# Patient Record
Sex: FEMALE | Race: WHITE | Employment: OTHER | URBAN - METROPOLITAN AREA
[De-identification: names, ages, dates, MRNs, and addresses within clinical notes are randomized per-mention and may not be internally consistent; named-entity substitution may affect disease eponyms.]

---

## 2017-07-11 ENCOUNTER — ANESTHESIA (OUTPATIENT)
Dept: ENDOSCOPY | Age: 72
End: 2017-07-11
Payer: MEDICARE

## 2017-07-11 ENCOUNTER — ANESTHESIA EVENT (OUTPATIENT)
Dept: ENDOSCOPY | Age: 72
End: 2017-07-11
Payer: MEDICARE

## 2017-07-11 ENCOUNTER — HOSPITAL ENCOUNTER (OUTPATIENT)
Age: 72
Setting detail: OUTPATIENT SURGERY
Discharge: HOME | End: 2017-07-11
Attending: SPECIALIST | Admitting: SPECIALIST
Payer: MEDICARE

## 2017-07-11 VITALS
SYSTOLIC BLOOD PRESSURE: 114 MMHG | HEIGHT: 65 IN | OXYGEN SATURATION: 99 % | RESPIRATION RATE: 16 BRPM | BODY MASS INDEX: 25.56 KG/M2 | TEMPERATURE: 97.6 F | WEIGHT: 153.44 LBS | DIASTOLIC BLOOD PRESSURE: 72 MMHG | HEART RATE: 61 BPM

## 2017-07-11 PROCEDURE — 76060000031 HC ANESTHESIA FIRST 0.5 HR: Performed by: SPECIALIST

## 2017-07-11 PROCEDURE — 74011250637 HC RX REV CODE- 250/637: Performed by: SPECIALIST

## 2017-07-11 PROCEDURE — 74011000258 HC RX REV CODE- 258

## 2017-07-11 PROCEDURE — 74011250636 HC RX REV CODE- 250/636

## 2017-07-11 PROCEDURE — 74011250636 HC RX REV CODE- 250/636: Performed by: SPECIALIST

## 2017-07-11 PROCEDURE — 77030027957 HC TBNG IRR ENDOGTR BUSS -B: Performed by: SPECIALIST

## 2017-07-11 PROCEDURE — 76040000019: Performed by: SPECIALIST

## 2017-07-11 RX ORDER — MIDAZOLAM HYDROCHLORIDE 1 MG/ML
.25-1 INJECTION, SOLUTION INTRAMUSCULAR; INTRAVENOUS
Status: DISCONTINUED | OUTPATIENT
Start: 2017-07-11 | End: 2017-07-11 | Stop reason: HOSPADM

## 2017-07-11 RX ORDER — PROPOFOL 10 MG/ML
INJECTION, EMULSION INTRAVENOUS AS NEEDED
Status: DISCONTINUED | OUTPATIENT
Start: 2017-07-11 | End: 2017-07-11 | Stop reason: HOSPADM

## 2017-07-11 RX ORDER — SODIUM CHLORIDE 0.9 % (FLUSH) 0.9 %
5-10 SYRINGE (ML) INJECTION AS NEEDED
Status: DISCONTINUED | OUTPATIENT
Start: 2017-07-11 | End: 2017-07-11 | Stop reason: HOSPADM

## 2017-07-11 RX ORDER — DEXTROMETHORPHAN/PSEUDOEPHED 2.5-7.5/.8
1.2 DROPS ORAL
Status: DISCONTINUED | OUTPATIENT
Start: 2017-07-11 | End: 2017-07-11 | Stop reason: HOSPADM

## 2017-07-11 RX ORDER — EPINEPHRINE 0.1 MG/ML
1 INJECTION INTRACARDIAC; INTRAVENOUS
Status: DISCONTINUED | OUTPATIENT
Start: 2017-07-11 | End: 2017-07-11 | Stop reason: HOSPADM

## 2017-07-11 RX ORDER — NALOXONE HYDROCHLORIDE 0.4 MG/ML
0.4 INJECTION, SOLUTION INTRAMUSCULAR; INTRAVENOUS; SUBCUTANEOUS
Status: DISCONTINUED | OUTPATIENT
Start: 2017-07-11 | End: 2017-07-11 | Stop reason: HOSPADM

## 2017-07-11 RX ORDER — SODIUM CHLORIDE 9 MG/ML
INJECTION, SOLUTION INTRAVENOUS
Status: DISCONTINUED | OUTPATIENT
Start: 2017-07-11 | End: 2017-07-11 | Stop reason: HOSPADM

## 2017-07-11 RX ORDER — FENTANYL CITRATE 50 UG/ML
200 INJECTION, SOLUTION INTRAMUSCULAR; INTRAVENOUS
Status: DISCONTINUED | OUTPATIENT
Start: 2017-07-11 | End: 2017-07-11 | Stop reason: HOSPADM

## 2017-07-11 RX ORDER — ATROPINE SULFATE 0.1 MG/ML
0.5 INJECTION INTRAVENOUS
Status: DISCONTINUED | OUTPATIENT
Start: 2017-07-11 | End: 2017-07-11 | Stop reason: HOSPADM

## 2017-07-11 RX ORDER — SODIUM CHLORIDE 9 MG/ML
50 INJECTION, SOLUTION INTRAVENOUS CONTINUOUS
Status: DISCONTINUED | OUTPATIENT
Start: 2017-07-11 | End: 2017-07-11 | Stop reason: HOSPADM

## 2017-07-11 RX ORDER — SODIUM CHLORIDE 0.9 % (FLUSH) 0.9 %
5-10 SYRINGE (ML) INJECTION EVERY 8 HOURS
Status: DISCONTINUED | OUTPATIENT
Start: 2017-07-11 | End: 2017-07-11 | Stop reason: HOSPADM

## 2017-07-11 RX ORDER — FLUMAZENIL 0.1 MG/ML
0.2 INJECTION INTRAVENOUS
Status: DISCONTINUED | OUTPATIENT
Start: 2017-07-11 | End: 2017-07-11 | Stop reason: HOSPADM

## 2017-07-11 RX ADMIN — PROPOFOL 25 MG: 10 INJECTION, EMULSION INTRAVENOUS at 14:01

## 2017-07-11 RX ADMIN — PROPOFOL 25 MG: 10 INJECTION, EMULSION INTRAVENOUS at 13:59

## 2017-07-11 RX ADMIN — PROPOFOL 25 MG: 10 INJECTION, EMULSION INTRAVENOUS at 14:05

## 2017-07-11 RX ADMIN — SODIUM CHLORIDE: 9 INJECTION, SOLUTION INTRAVENOUS at 13:47

## 2017-07-11 RX ADMIN — PROPOFOL 25 MG: 10 INJECTION, EMULSION INTRAVENOUS at 14:03

## 2017-07-11 RX ADMIN — SODIUM CHLORIDE 50 ML/HR: 900 INJECTION, SOLUTION INTRAVENOUS at 13:25

## 2017-07-11 RX ADMIN — SIMETHICONE 80 MG: 63.3; 3.7 SOLUTION/ DROPS ORAL at 14:04

## 2017-07-11 RX ADMIN — PROPOFOL 25 MG: 10 INJECTION, EMULSION INTRAVENOUS at 13:55

## 2017-07-11 RX ADMIN — PROPOFOL 100 MG: 10 INJECTION, EMULSION INTRAVENOUS at 13:53

## 2017-07-11 NOTE — H&P
Colonoscopy History and Physical      The patient was seen and examined. Date of last colonoscopy: 2005, Polyps  No      The airway was assessed and documented. The problem list, past medical history, and medications were reviewed. There is no problem list on file for this patient. Social History     Social History    Marital status:      Spouse name: N/A    Number of children: N/A    Years of education: N/A     Occupational History    Not on file. Social History Main Topics    Smoking status: Never Smoker    Smokeless tobacco: Never Used    Alcohol use Yes      Comment: occasional    Drug use: No    Sexual activity: Not on file     Other Topics Concern    Not on file     Social History Narrative    No narrative on file     No past medical history on file. Prior to Admission Medications   Prescriptions Last Dose Informant Patient Reported? Taking?   calcium-cholecalciferol, d3, (CALCIUM 600 + D) 600-125 mg-unit tab 7/10/2017 at Unknown time  Yes Yes   Sig: Take 1 Tab by mouth daily. Facility-Administered Medications: None       The patient was seen and examined in the endoscopy suite. The airway was assessed and docuemented. The problem list and medications were reviewed. Chief complaint, history of present illness, and review of systems and Past medical History are positive for: colon cancer screening. The heart, lungs and mental status were satisfactory for the administration of sedation and for the procedure. I discussed with the patient the objectives, risks, consequences and alternatives to the procedure.      Plan: Endoscopic procedure with sedation     Lincoln Champagne MD   7/11/2017  1:54 PM

## 2017-07-11 NOTE — IP AVS SNAPSHOT
Current Discharge Medication List  
  
CONTINUE these medications which have NOT CHANGED Dose & Instructions Dispensing Information Comments Morning Noon Evening Bedtime CALCIUM 600 + D 600-125 mg-unit Tab Generic drug:  calcium-cholecalciferol (d3) Your last dose was: Your next dose is:    
   
   
 Dose:  1 Tab Take 1 Tab by mouth daily. Refills:  0

## 2017-07-11 NOTE — ANESTHESIA PREPROCEDURE EVALUATION
Anesthetic History   No history of anesthetic complications            Review of Systems / Medical History  Patient summary reviewed, nursing notes reviewed and pertinent labs reviewed    Pulmonary  Within defined limits                 Neuro/Psych   Within defined limits           Cardiovascular                  Exercise tolerance: >4 METS     GI/Hepatic/Renal               Comments: screening Endo/Other  Within defined limits           Other Findings              Physical Exam    Airway  Mallampati: I  TM Distance: > 6 cm    Mouth opening: Normal     Cardiovascular    Rhythm: regular  Rate: normal         Dental  No notable dental hx       Pulmonary  Breath sounds clear to auscultation               Abdominal  Abdominal exam normal       Other Findings            Anesthetic Plan    ASA: 1  Anesthesia type: MAC          Induction: Intravenous  Anesthetic plan and risks discussed with: Patient

## 2017-07-11 NOTE — IP AVS SNAPSHOT
[address] 
[phone] Patient: Stephany Duffy MRN: [id number] QXN:[id number] You are allergic to the following No active allergies Recent Documentation Height Weight Breastfeeding? BMI OB Status Smoking Status 1.638 m 69.6 kg No 25.93 kg/m2 Postmenopausal Never Smoker Emergency Contacts Name Discharge Info Relation Home Work Mobile Nile Hutchins  Spouse [3] [phone] About your hospitalization You were admitted on:  July 11, 2017 You last received care in the:  [address] [place] [address] ENDOSCOPY You were discharged on:  July 11, 2017 Unit phone number:  [phone] Why you were hospitalized Your primary diagnosis was:  Not on File Providers Seen During Your Hospitalizations Provider Role Specialty Primary office phone Jose J Cantu MD Attending Provider Gastroenterology [phone] Your Primary Care Physician (PCP) Primary Care Physician Office Phone Office Fax Farida [facility] Follow-up Information None Current Discharge Medication List  
  
CONTINUE these medications which have NOT CHANGED Dose & Instructions Dispensing Information Comments Morning Noon Evening Bedtime CALCIUM 600 + D 600-125 mg-unit Tab Generic drug:  calcium-cholecalciferol (d3) Your last dose was: Your next dose is:    
   
   
 Dose:  1 Tab Take 1 Tab by mouth daily. Refills:  0 Discharge Instructions Stephany Duffy [id number] 
1945 Colon DISCHARGE INSTRUCTIONS Discomfort: 
redness at IV site- apply warm compress to area; if redness or soreness persist- contact your physician Gaseous discomfort- walking, belching will help relieve any discomfort You may not operate a vehicle for 12 hours You may not engage in an occupation involving machinery or appliances for rest of today. You may not drink alcoholic beverages for at least 12 hours Avoid making any critical decisions for at least 24 hour DIET You may resume your regular diet  however -  remember your colon is empty and a heavy meal will produce gas. Avoid these foods:  vegetables, fried / greasy foods, carbonated drinks MEDICATIONS Regarding Aspirin or Nonsteroidal medications specifically, please see below. ACTIVITY You may resume your normal daily activities. Spend the remainder of the day resting -  avoid any strenuous activity. CALL M.D. ANY SIGN OF Increasing pain, nausea, vomiting Abdominal distension (swelling) New increased bleeding (oral or rectal) Fever (chills) Bloody discharge from nose or mouth Shortness of breath or chest pain call 911 then call dr Keenan Bell You may  take any Advil, Aspirin, Ibuprofen, Motrin, Aleve, or  Tylenol as needed for pain. Follow-up Instructions: 
 Call Dr. Keenan Bell if needed No follow up colonoscopy necessary Telephone #  [phone] DISCHARGE SUMMARY from Nurse The following personal items collected during your admission are returned to you:  
Dental Appliance: Dental Appliances: None Vision: Visual Aid: Glasses Hearing Aid:   
Jewelry:   
Clothing:   
Other Valuables:   
Valuables sent to safe:   
 
 
 
 
  
 
 
Discharge Orders None Introducing [facility] & HEALTH SERVICES! [facility] introduces "[employer] patient portal. Now you can access parts of your medical record, email your doctor's office, and request medication refills online. 1. In your internet browser, go to [email or web address] 2. Click on the First Time User? Click Here link in the Sign In box. You will see the New Member Sign Up page. 3. Enter your "[employer] Access Code exactly as it appears below.  You will not need to use this code after youve completed the sign-up process. If you do not sign up before the expiration date, you must request a new code. · [employer] Access Code: [email or web address] Expires: 10/9/2017  2:42 PM 
 
4. Enter the last four digits of your Social Security Number (xxxx) and Date of Birth (mm/dd/yyyy) as indicated and click Submit. You will be taken to the next sign-up page. 5. Create a GeeYee ID. This will be your [employer] login ID and cannot be changed, so think of one that is secure and easy to remember. 6. Create a G[employer] password. You can change your password at any time. 7. Enter your Password Reset Question and Answer. This can be used at a later time if you forget your password. 8. Enter your e-mail address. You will receive e-mail notification when new information is available in [address] 9. Click Sign Up. You can now view and download portions of your medical record. 10. Click the Download Summary menu link to download a portable copy of your medical information. If you have questions, please visit the Frequently Asked Questions section of the [employer] website. Remember, [employer] is NOT to be used for urgent needs. For medical emergencies, dial 911. Now available from your iPhone and Android! General Information Please provide this summary of care documentation to your next provider. Patient Signature:  ____________________________________________________________ Date:  ____________________________________________________________  
  
Edoanh Bailey Provider Signature:  ____________________________________________________________ Date:  ____________________________________________________________

## 2017-07-11 NOTE — PROCEDURES
[address] [facility], [address]                 Colonoscopy Procedure Note    Indications:   See Preoperative Diagnosis above  Referring Physician: Roberto Montenegro MD  Anesthesia/Sedation: MAC anesthesia Propofol  Endoscopist:  Dr. Wallace Freeman  Assistant:  Endoscopy Technician-1: Keny Perez  Endoscopy RN-1: Flori Gaffney RN  Preoperative diagnosis: SCREENING  Postoperative diagnosis: diverticulosis    Procedure in Detail:  Informed consent was obtained for the procedure, including sedation. Risks of perforation, hemorrhage, adverse drug reaction, and aspiration were discussed. The patient was placed in the left lateral decubitus position. Based on the pre-procedure assessment, including review of the patient's medical history, medications, allergies, and review of systems, she had been deemed to be an appropriate candidate for moderate sedation; she was therefore sedated with the medications listed above. The patient was monitored continuously with ECG tracing, pulse oximetry, blood pressure monitoring, and direct observations. A rectal examination was performed. The [id number] was inserted into the rectum and advanced under direct vision to the cecum, which was identified by the ileocecal valve and appendiceal orifice. The quality of the colonic preparation was good. A careful inspection was made as the colonoscope was withdrawn, including a retroflexed view of the rectum; findings and interventions are described below. Appropriate photodocumentation was obtained. Findings:  Rectum: normal  Sigmoid: mild diverticulosis; Descending Colon: mild diverticulosis;  Transverse Colon: normal  Ascending Colon: normal  Cecum: normal    Specimens:    none    EBL: None    Complications: None; patient tolerated the procedure well. Recommendations:     - High fiber diet.     Signed By: Wallace Freeman MD                        July 11, 2017

## 2017-07-11 NOTE — DISCHARGE INSTRUCTIONS
Suellen Paiz  [id number]  1945     Colon DISCHARGE INSTRUCTIONS  Discomfort:  redness at IV site- apply warm compress to area; if redness or soreness persist- contact your physician  Gaseous discomfort- walking, belching will help relieve any discomfort  You may not operate a vehicle for 12 hours  You may not engage in an occupation involving machinery or appliances for rest of today. You may not drink alcoholic beverages for at least 12 hours  Avoid making any critical decisions for at least 24 hour  DIET  You may resume your regular diet - however -  remember your colon is empty and a heavy meal will produce gas. Avoid these foods:  vegetables, fried / greasy foods, carbonated drinks  MEDICATIONS   Regarding Aspirin or Nonsteroidal medications specifically, please see below. ACTIVITY  You may resume your normal daily activities. Spend the remainder of the day resting -  avoid any strenuous activity. CALL M.D. ANY SIGN OF   Increasing pain, nausea, vomiting  Abdominal distension (swelling)  New increased bleeding (oral or rectal)  Fever (chills)  Bloody discharge from nose or mouth  Shortness of breath or chest pain call 911 then call dr Soco Wagner may  take any Advil, Aspirin, Ibuprofen, Motrin, Aleve, or  Tylenol as needed for pain.     Follow-up Instructions:   Call Dr. Ivette Arambula if needed               No follow up colonoscopy necessary     Telephone #  [phone]        Darwin Adamson from Nurse    The following personal items collected during your admission are returned to you:   Dental Appliance: Dental Appliances: None  Vision: Visual Aid: Glasses  Hearing Aid:    Jewelry:    Clothing:    Other Valuables:    Valuables sent to safe:

## 2017-07-11 NOTE — ANESTHESIA POSTPROCEDURE EVALUATION
Post-Anesthesia Evaluation and Assessment    Patient: Watson Del Cid MRN: [id number]  SSN: [id number]    YOB: 1945  Age: 71 y.o. Sex: female       Cardiovascular Function/Vital Signs  Visit Vitals    /75    Pulse 67    Temp 36.4 °C (97.6 °F)    Resp 14    Ht 5' 4.5\" (1.638 m)    Wt 69.6 kg (153 lb 7 oz)    SpO2 97%    Breastfeeding No    BMI 25.93 kg/m2       Patient is status post MAC anesthesia for Procedure(s):  COLONOSCOPY. Nausea/Vomiting: None    Postoperative hydration reviewed and adequate. Pain:  Pain Scale 1: Numeric (0 - 10) (07/11/17 1312)  Pain Intensity 1: 0 (07/11/17 1312)   Managed    Neurological Status: At baseline    Mental Status and Level of Consciousness: Arousable    Pulmonary Status:   O2 Device: Nasal cannula (07/11/17 1409)   Adequate oxygenation and airway patent    Complications related to anesthesia: None    Post-anesthesia assessment completed.  No concerns    Signed By: Porfirio Florez MD     July 11, 2017

## 2017-07-11 NOTE — ROUTINE PROCESS
[facility]  1945  [id number]    Situation:  Verbal report received from: Amari Kevin RN  Procedure: Procedure(s):  COLONOSCOPY    Background:    Preoperative diagnosis: SCREENING  Postoperative diagnosis: diverticulosis    :  Dr. Shayne Blunt  Assistant(s): Endoscopy Technician-1: Fabian Galan  Endoscopy RN-1: Daylin Montiel RN    Specimens: * No specimens in log *  H. Pylori  no    Assessment:  Intra-procedure medications           Anesthesia gave intra-procedure sedation and medications, see anesthesia flow sheet yes    Intravenous fluids: NS@ KVO     Vital signs stable     Abdominal assessment: round and soft     Recommendation:  Discharge patient per MD order  Family or Friend   Permission to share finding with family or friend yes

## 2017-09-11 ENCOUNTER — TELEPHONE (OUTPATIENT)
Dept: CARDIOLOGY CLINIC | Age: 72
End: 2017-09-11

## 2017-09-11 ENCOUNTER — OFFICE VISIT (OUTPATIENT)
Dept: CARDIOLOGY CLINIC | Age: 72
End: 2017-09-11

## 2017-09-11 VITALS
RESPIRATION RATE: 18 BRPM | SYSTOLIC BLOOD PRESSURE: 78 MMHG | HEART RATE: 116 BPM | OXYGEN SATURATION: 100 % | BODY MASS INDEX: 26.19 KG/M2 | DIASTOLIC BLOOD PRESSURE: 50 MMHG | HEIGHT: 65 IN | WEIGHT: 157.2 LBS

## 2017-09-11 DIAGNOSIS — I48.0 PAROXYSMAL ATRIAL FIBRILLATION (HCC): Primary | ICD-10-CM

## 2017-09-11 DIAGNOSIS — R00.0 HEART RATE FAST: ICD-10-CM

## 2017-09-11 RX ORDER — METOPROLOL TARTRATE 25 MG/1
25 TABLET, FILM COATED ORAL DAILY
COMMUNITY
Start: 2017-08-19 | End: 2017-09-16

## 2017-09-11 RX ORDER — ASPIRIN 81 MG/1
TABLET ORAL DAILY
COMMUNITY
End: 2019-09-10

## 2017-09-11 RX ORDER — AMIODARONE HYDROCHLORIDE 200 MG/1
200 TABLET ORAL DAILY
Qty: 30 TAB | Refills: 1 | Status: SHIPPED | OUTPATIENT
Start: 2017-09-11 | End: 2017-11-01 | Stop reason: SDUPTHER

## 2017-09-11 NOTE — PROGRESS NOTES
NAME:  Lianne Patel   :   1945   MRN:   [id number]   PCP:  Rodrick Tadeo MD           Subjective: The patient is a 71y.o. year old female  who presents for a new patient evalation for the following: PAF. Patient reports an awareness of palpitaitons on and off that were noted to PCP in . Since that time, she has had progressive symptoms. More recently after a drive to [place], she felt nauseated and lightheaded and \"needed air\". She had her  pull over to the side of the road so she could get out. She got out and cannot recall further events until he picked her up off the side of the road. She continues to have episodes on and off and feels \"tired\" at times. No past medical history on file. Social History   Substance Use Topics    Smoking status: Former Smoker     Packs/day: 0.50     Years: 6.00     Quit date: 1970    Smokeless tobacco: Never Used    Alcohol use Yes      Comment: occasional wine      No family history on file. Review of Systems  Constitutional: Negative for fever, chills, and diaphoresis. Respiratory: Negative for cough, hemoptysis, sputum production, shortness of breath. Reports dyspnea   Cardiovascular: Negative for chest pain, orthopnea, claudication, leg swelling and PND. Reports palpitations. Gastrointestinal: Negative for heartburn, nausea, vomiting, blood in stool and melena. Genitourinary: Negative for dysuria and flank pain. Musculoskeletal: Negative for joint pain and back pain. Skin: Negative for rash. Neurological: Negative for focal weakness, seizures, loss of consciousness, weakness and headaches. Endo/Heme/Allergies: Does not bruise/bleed easily. Psychiatric/Behavioral: Negative for memory loss. The patient does not have insomnia.         Objective:       Vitals:    17 1302 17 1317 17 1318   BP: 90/70 (!) 80/60 (!) 78/50   Pulse: (!) 116     Resp: 18     SpO2: 100%     Weight: 157 lb 3.2 oz (71.3 kg) Height: 5' 4.5\" (1.638 m)      Body mass index is 26.57 kg/(m^2). General PE    Gen: NAD     Mental Status - Alert. General Appearance - Not in acute distress. Neck - no JVD     Chest and Lung Exam     Inspection: Accessory muscles - No use of accessory muscles in breathing. Auscultation:   Breath sounds: - Normal.     Cardiovascular   Inspection: Jugular vein - Bilateral - Inspection Normal.   Palpation/Percussion:   Apical Impulse: - Normal.   Auscultation: Rhythm - rapid, irregular. Heart Sounds - S1 WNL and S2 WNL. No S3 or S4. Murmurs & Other Heart Sounds: Auscultation of the heart reveals - No Murmurs. Peripheral Vascular   Upper Extremity: Inspection - Bilateral - No Cyanotic nailbeds or Digital clubbing. Lower Extremity:   Palpation: Edema - Bilateral - No edema. Abdomen: Soft, non-tender, bowel sounds are active. Neuro: A&O times 3, CN and motor grossly WNL      Data Review:     EKG -  Atrial fibrillation, ventricular rate 132. Allergies reviewed  No Known Allergies    Medications reviewed  Current Outpatient Prescriptions   Medication Sig    metoprolol tartrate (LOPRESSOR) 25 mg tablet 12.5 mg daily.  aspirin delayed-release 81 mg tablet Take  by mouth daily.  calcium-cholecalciferol, d3, (CALCIUM 600 + D) 600-125 mg-unit tab Take 1 Tab by mouth daily. No current facility-administered medications for this visit. Assessment:       ICD-10-CM ICD-9-CM    1. Paroxysmal atrial fibrillation (HCC) I48.0 427.31 AMB POC EKG ROUTINE W/ 12 LEADS, INTER & REP   2. Heart rate fast R00.0 785.0         Orders Placed This Encounter    AMB POC EKG ROUTINE W/ 12 LEADS, INTER & REP     Order Specific Question:   Reason for Exam:     Answer:   routine    metoprolol tartrate (LOPRESSOR) 25 mg tablet     Si.5 mg daily.  aspirin delayed-release 81 mg tablet     Sig: Take  by mouth daily.        Patient Active Problem List   Diagnosis Code    Heart rate fast R00.0  Paroxysmal atrial fibrillation (HCC) I48.0       Plan:     Patient presents for new patient evaluation for new onset atrial fibrillation. Will initiate xarelto 20mg, BUN/Creat  13/0.7 (8/19/17). Start amiodarone 200mg daily as her bps are too low to titrate beta blocker. Schedule pt for PVI. Ramu Rao, [address] [place] [address] Cardiology    9/11/2017         Agree with note as outlined by  NP. I confirm findings in history and physical exam. No additional findings noted. Agree with plan as outlined above. Discussed PVI. Patient is willing to proceed. Discussed with Dr. Lucy Bajwa. Will see her in EP clinic tomorrow.     [address] MD

## 2017-09-11 NOTE — MR AVS SNAPSHOT
Visit Information Date & Time Provider Department Dept. Phone Encounter #  
 9/11/2017  1:30 PM Courtney Smith, [address] [facility] [phone] [id number] Upcoming Health Maintenance Date Due Hepatitis C Screening 1945 DTaP/Tdap/Td series (1 - Tdap) 4/23/1967 BREAST CANCER SCRN MAMMOGRAM 4/23/1996 FOBT Q 1 YEAR AGE 50-75 4/23/1996 ZOSTER VACCINE AGE 60> 2/23/2006 GLAUCOMA SCREENING Q2Y 4/23/2011 OSTEOPOROSIS SCREENING (DEXA) 4/23/2011 Pneumococcal 65+ Low/Medium Risk (1 of 2 - PCV13) 4/23/2011 MEDICARE YEARLY EXAM 4/23/2011 INFLUENZA AGE 9 TO ADULT 8/1/2017 Allergies as of 9/11/2017  Review Complete On: 9/11/2017 By: Janna Verma LPN No Known Allergies Current Immunizations  Never Reviewed No immunizations on file. Not reviewed this visit You Were Diagnosed With   
  
 Codes Comments Paroxysmal atrial fibrillation (HCC)    -  Primary ICD-10-CM: I48.0 ICD-9-CM: 427.31 Heart rate fast     ICD-10-CM: R00.0 ICD-9-CM: 877. 0 Vitals BP Pulse Resp Height(growth percentile) Weight(growth percentile) SpO2  
 (!) 78/50 (BP 1 Location: Left arm, BP Patient Position: Standing) (!) 116 18 5' 4.5\" (1.638 m) 157 lb 3.2 oz (71.3 kg) 100% BMI OB Status Smoking Status 26.57 kg/m2 Postmenopausal Former Smoker Vitals History BMI and BSA Data Body Mass Index Body Surface Area  
 26.57 kg/m 2 1.8 m 2 Preferred Pharmacy Pharmacy Name Phone [id number] [place], [address] [facility] [phone] Your Updated Medication List  
  
   
This list is accurate as of: 9/11/17  1:57 PM.  Always use your most recent med list.  
  
  
  
  
 amiodarone 200 mg tablet Commonly known as:  CORDARONE Take 1 Tab by mouth daily. aspirin delayed-release 81 mg tablet Take  by mouth daily. CALCIUM 600 + D 600-125 mg-unit Tab Generic drug:  calcium-cholecalciferol (d3) Take 1 Tab by mouth daily. metoprolol tartrate 25 mg tablet Commonly known as:  LOPRESSOR  
12.5 mg daily. rivaroxaban 20 mg Tab tablet Commonly known as:  [place] Blocker Take 1 Tab by mouth daily. Prescriptions Sent to Pharmacy Refills  
 amiodarone (CORDARONE) 200 mg tablet 1 Sig: Take 1 Tab by mouth daily. Class: Normal  
 [facility], [address] [place] [address] Ph #: [phone] Route: Oral  
  
We Performed the Following AMB POC EKG ROUTINE W/ 12 LEADS, INTER & REP [97433 CPT(R)] Introducing [facility] & HEALTH SERVICES! Lucy [facility] introduces [employer] patient portal. Now you can access parts of your medical record, email your doctor's office, and request medication refills online. 1. In your internet browser, go to [email or web address] 2. Click on the First Time User? Click Here link in the Sign In box. You will see the New Member Sign Up page. 3. Enter your [employer] Access Code exactly as it appears below. You will not need to use this code after youve completed the sign-up process. If you do not sign up before the expiration date, you must request a new code. · [employer] Access Code: [email or web address] Expires: 10/9/2017  2:42 PM 
 
4. Enter the last four digits of your Social Security Number (xxxx) and Date of Birth (mm/dd/yyyy) as indicated and click Submit. You will be taken to the next sign-up page. 5. Create a Stalkthist ID. This will be your [employer] login ID and cannot be changed, so think of one that is secure and easy to remember. 6. Create a [employer] password. You can change your password at any time. 7. Enter your Password Reset Question and Answer. This can be used at a later time if you forget your password. 8. Enter your e-mail address.  You will receive e-mail notification when new information is available in Look[employer]ider. 9. Click Sign Up. You can now view and download portions of your medical record. 10. Click the Download Summary menu link to download a portable copy of your medical information. If you have questions, please visit the Frequently Asked Questions section of the [employer] website. Remember, [employer] is NOT to be used for urgent needs. For medical emergencies, dial 911. Now available from your iPhone and Android! Please provide this summary of care documentation to your next provider. Your primary care clinician is listed as Danforth Kristi. If you have any questions after today's visit, please call [phone].

## 2017-09-11 NOTE — PROGRESS NOTES
Chief Complaint   Patient presents with    Rapid Heart Rate     NP, ref by Dr. Feliz Low. C/O fast heart beat, syncope, palps.

## 2017-09-11 NOTE — TELEPHONE ENCOUNTER
Verified patient with two identifiers. Spoke with Cece Lee on HIPAA letting him know that Dr. Venu Khan would like to see him tomorrow. She is scheduled for 9:30 Tuesday 9/12. He verbalized understanding.

## 2017-09-12 ENCOUNTER — OFFICE VISIT (OUTPATIENT)
Dept: CARDIOLOGY CLINIC | Age: 72
End: 2017-09-12

## 2017-09-12 ENCOUNTER — HOSPITAL ENCOUNTER (OUTPATIENT)
Dept: LAB | Age: 72
Discharge: HOME | End: 2017-09-12

## 2017-09-12 ENCOUNTER — HOSPITAL ENCOUNTER (OUTPATIENT)
Dept: GENERAL RADIOLOGY | Age: 72
Discharge: HOME | End: 2017-09-12
Payer: MEDICARE

## 2017-09-12 VITALS
SYSTOLIC BLOOD PRESSURE: 98 MMHG | DIASTOLIC BLOOD PRESSURE: 70 MMHG | HEIGHT: 65 IN | RESPIRATION RATE: 16 BRPM | OXYGEN SATURATION: 98 % | BODY MASS INDEX: 26.41 KG/M2 | HEART RATE: 76 BPM | WEIGHT: 158.5 LBS

## 2017-09-12 DIAGNOSIS — R00.2 PALPITATION: ICD-10-CM

## 2017-09-12 DIAGNOSIS — I48.0 PAROXYSMAL ATRIAL FIBRILLATION (HCC): Primary | ICD-10-CM

## 2017-09-12 DIAGNOSIS — I48.0 PAROXYSMAL ATRIAL FIBRILLATION (HCC): ICD-10-CM

## 2017-09-12 PROCEDURE — 71020 XR CHEST PA LAT: CPT

## 2017-09-12 NOTE — MR AVS SNAPSHOT
Visit Information Date & Time Provider Department Dept. Phone Encounter #  
 9/12/2017  9:30 AM Sebastien Brown, [address] [facility] [phone] [id number] Follow-up Instructions Return in about 4 weeks (around 10/10/2017). Follow-up and Disposition History Upcoming Health Maintenance Date Due Hepatitis C Screening 1945 DTaP/Tdap/Td series (1 - Tdap) 4/23/1967 BREAST CANCER SCRN MAMMOGRAM 4/23/1996 FOBT Q 1 YEAR AGE 50-75 4/23/1996 ZOSTER VACCINE AGE 60> 2/23/2006 GLAUCOMA SCREENING Q2Y 4/23/2011 OSTEOPOROSIS SCREENING (DEXA) 4/23/2011 Pneumococcal 65+ Low/Medium Risk (1 of 2 - PCV13) 4/23/2011 MEDICARE YEARLY EXAM 4/23/2011 INFLUENZA AGE 9 TO ADULT 8/1/2017 Allergies as of 9/12/2017  Review Complete On: 9/12/2017 By: Hemal Dewey MD  
 No Known Allergies Current Immunizations  Never Reviewed No immunizations on file. Not reviewed this visit You Were Diagnosed With   
  
 Codes Comments Paroxysmal atrial fibrillation (HCC)    -  Primary ICD-10-CM: I48.0 ICD-9-CM: 427.31 Palpitation     ICD-10-CM: R00.2 ICD-9-CM: 785.1 Vitals BP Pulse Resp Height(growth percentile) Weight(growth percentile) SpO2  
 98/70 (BP 1 Location: Left arm, BP Patient Position: Sitting) 76 16 5' 4.5\" (1.638 m) 158 lb 8 oz (71.9 kg) 98% BMI OB Status Smoking Status 26.79 kg/m2 Postmenopausal Former Smoker Vitals History BMI and BSA Data Body Mass Index Body Surface Area  
 26.79 kg/m 2 1.81 m 2 Preferred Pharmacy Pharmacy Name Phone [id number], [address] [place] [address] [phone] Your Updated Medication List  
  
   
This list is accurate as of: 9/12/17 10:51 AM.  Always use your most recent med list.  
  
  
  
  
 amiodarone 200 mg tablet Commonly known as:  CORDARONE Take 1 Tab by mouth daily. aspirin delayed-release 81 mg tablet Take  by mouth daily. CALCIUM 600 + D 600-125 mg-unit Tab Generic drug:  calcium-cholecalciferol (d3) Take 1 Tab by mouth daily. metoprolol tartrate 25 mg tablet Commonly known as:  LOPRESSOR Take 25 mg by mouth daily. rivaroxaban 20 mg Tab tablet Commonly known as:  Electa Dux Take 1 Tab by mouth daily. We Performed the Following AMB POC EKG ROUTINE W/ 12 LEADS, INTER & REP [32056 CPT(R)] CBC W/O DIFF [26093 CPT(R)] MAGNESIUM [id number] CPT(R)] METABOLIC PANEL, COMPREHENSIVE [52126 CPT(R)] PROTHROMBIN TIME + INR [01821 CPT(R)] Follow-up Instructions Return in about 4 weeks (around 10/10/2017). To-Do List   
 09/12/2017 Imaging:  CTA CHEST W OR W WO CONT   
  
 09/12/2017 Imaging:  XR CHEST PA LAT   
  
 09/14/2017 4:30 PM  
  Appointment with [facility] CT 2 at [facility] CT ([phone]) CONTRAST STUDY: 1. The patient should not eat solid food four hours before the appointment but should be encouraged to drink clear liquids. 2. The patient will require IV access for contrast administration. 3. The patient should not take  Ibuprofen (Advil, Motrin, etc.) and Naproxen Sodium (Aleve, etc.)  on the day of the exam. Stopping non-steroidal anti-inflammatory agents (NSAIDs) like Ibuprofen decreases the risk of kidney damage from the x-ray contrast (dye). 4. Bring any non [facility] facility films/images pertaining to the area of interest with you on the day of appointment. 5. Bring current lab work if available(within last 90 days [facility]) ***If scheduled at [facility] is not available, labs will need to be done before appointment*** 6. Check in at registration at least 30 minutes before appt time unless you were instructed to do otherwise. 09/15/2017 10:30 AM  
  Appointment with CATH EP ROOM [facility] at [address] [facility] ([phone]) NPO AFTER MIDNIGHT! ROUTINE CASES:  Please arrive 2 hour prior to your scheduled appointment time. If your scheduled appointment is for 0730, 0800, 0815, please arrive by 0645. NON ROUTINE CASES:  PATIENTS WHO REQUIRE LABS, X-RAY, EKG, or MEDS:  PLEASE ARRIVE 3 HOURS PRIOR TO YOUR SCHEDULED APPOINTMENT. If you require hydration prior to your procedure, PLEASE ARRIVE 4 HOURS PRIOR TO YOUR APPOINTMENT  **** IT IS THE OFFICE SCHEDULARS RESPONSBILITY TO NOTIFY THE CATH LAB SCHEDULAR IF THE PATIENT WILL REQUIRE ANY ADDITIONAL TIME FOR PREP FROM ROUTINE CASE ***** Introducing [facility]! Eder Matos introduces Domosite patient portal. Now you can access parts of your medical record, email your doctor's office, and request medication refills online. 1. In your internet browser, go to [email or web address] 2. Click on the First Time User? Click Here link in the Sign In box. You will see the New Member Sign Up page. 3. Enter your Domosite Access Code exactly as it appears below. You will not need to use this code after youve completed the sign-up process. If you do not sign up before the expiration date, you must request a new code. · Domosite Access Code: [email or web address] Expires: 10/9/2017  2:42 PM 
 
4. Enter the last four digits of your Social Security Number (xxxx) and Date of Birth (mm/dd/yyyy) as indicated and click Submit. You will be taken to the next sign-up page. 5. Create a Domosite ID. This will be your Domosite login ID and cannot be changed, so think of one that is secure and easy to remember. 6. Create a Domosite password. You can change your password at any time. 7. Enter your Password Reset Question and Answer. This can be used at a later time if you forget your password. 8. Enter your e-mail address. You will receive e-mail notification when new information is available in [address] 9. Click Sign Up.  You can now view and download portions of your medical record. 10. Click the Download Summary menu link to download a portable copy of your medical information. If you have questions, please visit the Frequently Asked Questions section of the PocketGuide website. Remember, PocketGuide is NOT to be used for urgent needs. For medical emergencies, dial 911. Now available from your iPhone and Android! Please provide this summary of care documentation to your next provider. Your primary care clinician is listed as Earl Patel. If you have any questions after today's visit, please call [phone].

## 2017-09-12 NOTE — PROGRESS NOTES
Subjective:      Meera Crocker is a 71 y.o. female is here for EP consult. She has been having palpitations on and off since June. She was in [place] and went into the ER post a syncopal episode - she was in AF with rvr. She is back in AF and feeling tired. Patient Active Problem List    Diagnosis Date Noted    Heart rate fast 09/11/2017    Paroxysmal atrial fibrillation ([address] 09/11/2017      Manuel Berry MD  No past medical history on file. Past Surgical History:   Procedure Laterality Date    BREAST SURGERY PROCEDURE UNLISTED      breadt biopsy left    COLONOSCOPY N/A 7/11/2017    COLONOSCOPY performed by Elli Bender [facility] at [facility] ENDOSCOPY    HX ORTHOPAEDIC      bunionectomy right foot     No Known Allergies   No family history on file. negative for cardiac disease  Social History     Social History    Marital status:      Spouse name: N/A    Number of children: N/A    Years of education: N/A     Social History Main Topics    Smoking status: Former Smoker     Packs/day: 0.50     Years: 6.00     Quit date: 9/11/1970    Smokeless tobacco: Never Used    Alcohol use Yes      Comment: occasional wine    Drug use: No    Sexual activity: Not Asked     Other Topics Concern    None     Social History Narrative     Current Outpatient Prescriptions   Medication Sig    metoprolol tartrate (LOPRESSOR) 25 mg tablet Take 25 mg by mouth daily.  amiodarone (CORDARONE) 200 mg tablet Take 1 Tab by mouth daily.  rivaroxaban (XARELTO) 20 mg tab tablet Take 1 Tab by mouth daily.  calcium-cholecalciferol, d3, (CALCIUM 600 + D) 600-125 mg-unit tab Take 1 Tab by mouth daily.  aspirin delayed-release 81 mg tablet Take  by mouth daily. No current facility-administered medications for this visit.        Vitals:    09/12/17 0928   BP: 98/70   Pulse: 76   Resp: 16   SpO2: 98%   Weight: 158 lb 8 oz (71.9 kg)   Height: 5' 4.5\" (1.638 m)       I have reviewed the nurses notes, vitals, problem list, allergy list, medical history, family, social history and medications. Review of Symptoms:    General: Pt denies excessive weight gain or loss. Pt is able to conduct ADL's  HEENT: Denies blurred vision, headaches, epistaxis and difficulty swallowing. Respiratory: + shortness of breath, denies RUANO, wheezing or stridor. Cardiovascular: +palpitations  Gastrointestinal: Denies poor appetite, indigestion, abdominal pain or blood in stool  Urinary: Denies dysuria, pyuria  Musculoskeletal: Denies pain or swelling from muscles or joints  Neurologic: Denies tremor, paresthesias, or sensory motor disturbance  Skin: Denies rash, itching or texture change. Psych: Denies depression      Physical Exam:      General: Well developed, in no acute distress. HEENT: Eyes - PERRL, no jvd  Heart:  irr irr, tachy  Respiratory: Clear bilaterally x 4, no wheezing or rales  Abdomen:   Soft, non-tender, bowel sounds are active.   Extremities:  No edema, normal cap refill, no cyanosis. Musculoskeletal: No clubbing  Neuro: A&Ox3, speech clear, gait stable. Skin: Skin color is normal. No rashes or lesions. Non diaphoretic  Vascular: 2+ pulses symmetric in all extremities    Cardiographics    Ekg: afib with rvr    Echo - nl lvef, nl la size    No results found for this or any previous visit. No results found for: WBC, HGBPOC, HGB, HGBP, HCTPOC, HCT, PHCT, RBCH, PLT, MCV, HGBEXT, HCTEXT, PLTEXT   No results found for: NA, K, CL, CO2, AGAP, GLU, BUN, CREA, BUCR, GFRAA, GFRNA, CA, TBIL, TBILI, GPT, SGOT, AP, TP, ALB, GLOB, AGRAT, ALT      Assessment:     Assessment:        ICD-10-CM ICD-9-CM    1. Paroxysmal atrial fibrillation (HCC) I48.0 427.31 AMB POC EKG ROUTINE W/ 12 LEADS, INTER & REP      PROTHROMBIN TIME + INR      METABOLIC PANEL, COMPREHENSIVE      MAGNESIUM      CBC W/O DIFF      XR CHEST PA LAT      CTA CHEST W OR W WO CONT   2.  Palpitation R00.2 785.1      Orders Placed This Encounter    XR CHEST PA LAT Standing Status:   Future     Standing Expiration Date:   10/12/2018     Order Specific Question:   Reason for Exam     Answer:   PVI    CTA CHEST W OR W WO CONT     Standing Status:   Future     Standing Expiration Date:   10/12/2018     Order Specific Question:   Is Patient Allergic to Contrast Dye? Answer:   Unknown     Order Specific Question:   Stat POC Creatinine as needed for Radiology Policy     Answer: Yes    PROTHROMBIN TIME + INR    METABOLIC PANEL, COMPREHENSIVE    MAGNESIUM    CBC W/O DIFF    AMB POC EKG ROUTINE W/ 12 LEADS, INTER & REP     Order Specific Question:   Reason for Exam:     Answer:   Routine        Plan:   Ms Keagan Lemus is a pleasant lady with a chadsvasc score of 2 (female, age of 73) with symptomatic atrial fibrillation. She is a candidate for an afib ablation/ILR. I discussed the risks/benefits/alternatives of the procedure with the patient. Risks include (but are not limited to) bleeding, heart block, infection, cva/mi/tamponade/esophageal perforation/pv stenosis/death. The patient understands that there is a 8-6% major complication rate and agrees to proceed. Thank you for this interesting consultation. Continue medical management for AF. Thank you for allowing me to participate in Deanna Poole 's care.     Suzanne Ozuna MD, Jamal Pereira

## 2017-09-12 NOTE — PROGRESS NOTES
Chief Complaint   Patient presents with    Irregular Heart Beat     eval for EP study; flutters becoming more frequent x 1 wk

## 2017-09-13 LAB
ALBUMIN SERPL-MCNC: 4.2 G/DL (ref 3.5–4.8)
ALBUMIN/GLOB SERPL: 1.8 {RATIO} (ref 1.2–2.2)
ALP SERPL-CCNC: 80 IU/L (ref 39–117)
ALT SERPL-CCNC: 16 IU/L (ref 0–32)
AST SERPL-CCNC: 23 IU/L (ref 0–40)
BILIRUB SERPL-MCNC: 0.4 MG/DL (ref 0–1.2)
BUN SERPL-MCNC: 16 MG/DL (ref 8–27)
BUN/CREAT SERPL: 17 (ref 12–28)
CALCIUM SERPL-MCNC: 9.5 MG/DL (ref 8.7–10.3)
CHLORIDE SERPL-SCNC: 100 MMOL/L (ref 96–106)
CO2 SERPL-SCNC: 27 MMOL/L (ref 18–29)
CREAT SERPL-MCNC: 0.92 MG/DL (ref 0.57–1)
ERYTHROCYTE [DISTWIDTH] IN BLOOD BY AUTOMATED COUNT: 14.3 % (ref 12.3–15.4)
GLOBULIN SER CALC-MCNC: 2.4 G/DL (ref 1.5–4.5)
GLUCOSE SERPL-MCNC: 88 MG/DL (ref 65–99)
HCT VFR BLD AUTO: 44 % (ref 34–46.6)
HGB BLD-MCNC: 14.3 G/DL (ref 11.1–15.9)
INR PPP: 1.1 (ref 0.8–1.2)
MAGNESIUM SERPL-MCNC: 2.2 MG/DL (ref 1.6–2.3)
MCH RBC QN AUTO: 30.6 PG (ref 26.6–33)
MCHC RBC AUTO-ENTMCNC: 32.5 G/DL (ref 31.5–35.7)
MCV RBC AUTO: 94 FL (ref 79–97)
PLATELET # BLD AUTO: 272 X10E3/UL (ref 150–379)
POTASSIUM SERPL-SCNC: 5.1 MMOL/L (ref 3.5–5.2)
PROT SERPL-MCNC: 6.6 G/DL (ref 6–8.5)
PROTHROMBIN TIME: 11.2 SEC (ref 9.1–12)
RBC # BLD AUTO: 4.68 X10E6/UL (ref 3.77–5.28)
SODIUM SERPL-SCNC: 140 MMOL/L (ref 134–144)
WBC # BLD AUTO: 7.5 X10E3/UL (ref 3.4–10.8)

## 2017-09-14 ENCOUNTER — HOSPITAL ENCOUNTER (OUTPATIENT)
Dept: CT IMAGING | Age: 72
Discharge: HOME | End: 2017-09-14
Attending: NURSE PRACTITIONER
Payer: MEDICARE

## 2017-09-14 DIAGNOSIS — I48.0 PAROXYSMAL ATRIAL FIBRILLATION (HCC): ICD-10-CM

## 2017-09-14 PROCEDURE — 74011250636 HC RX REV CODE- 250/636: Performed by: NURSE PRACTITIONER

## 2017-09-14 PROCEDURE — 74011636320 HC RX REV CODE- 636/320: Performed by: NURSE PRACTITIONER

## 2017-09-14 PROCEDURE — 71275 CT ANGIOGRAPHY CHEST: CPT

## 2017-09-14 RX ORDER — SODIUM CHLORIDE 9 MG/ML
50 INJECTION, SOLUTION INTRAVENOUS
Status: COMPLETED | OUTPATIENT
Start: 2017-09-14 | End: 2017-09-14

## 2017-09-14 RX ORDER — SODIUM CHLORIDE 0.9 % (FLUSH) 0.9 %
10 SYRINGE (ML) INJECTION
Status: COMPLETED | OUTPATIENT
Start: 2017-09-14 | End: 2017-09-14

## 2017-09-14 RX ADMIN — Medication 10 ML: at 17:38

## 2017-09-14 RX ADMIN — IOPAMIDOL 100 ML: 755 INJECTION, SOLUTION INTRAVENOUS at 17:38

## 2017-09-14 RX ADMIN — SODIUM CHLORIDE 50 ML/HR: 900 INJECTION, SOLUTION INTRAVENOUS at 17:38

## 2017-09-15 ENCOUNTER — ANESTHESIA EVENT (OUTPATIENT)
Dept: CARDIAC CATH/INVASIVE PROCEDURES | Age: 72
End: 2017-09-15
Payer: MEDICARE

## 2017-09-15 ENCOUNTER — HOSPITAL ENCOUNTER (OUTPATIENT)
Dept: NON INVASIVE DIAGNOSTICS | Age: 72
Setting detail: OBSERVATION
Discharge: HOME | End: 2017-09-16
Attending: INTERNAL MEDICINE | Admitting: INTERNAL MEDICINE
Payer: MEDICARE

## 2017-09-15 ENCOUNTER — ANESTHESIA (OUTPATIENT)
Dept: CARDIAC CATH/INVASIVE PROCEDURES | Age: 72
End: 2017-09-15
Payer: MEDICARE

## 2017-09-15 PROBLEM — I48.91 A-FIB (HCC): Status: ACTIVE | Noted: 2017-09-15

## 2017-09-15 LAB
ACT BLD: 147 SECS (ref 79–138)
ACT BLD: 301 SECS (ref 79–138)

## 2017-09-15 PROCEDURE — 77030030278 HC COAX UMB DISP MEDT -C

## 2017-09-15 PROCEDURE — 99218 HC RM OBSERVATION: CPT

## 2017-09-15 PROCEDURE — 77030018729 HC ELECTRD DEFIB PAD CARD -B

## 2017-09-15 PROCEDURE — 77010033678 HC OXYGEN DAILY

## 2017-09-15 PROCEDURE — 74011000250 HC RX REV CODE- 250

## 2017-09-15 PROCEDURE — C1769 GUIDE WIRE: HCPCS

## 2017-09-15 PROCEDURE — 77030030806 HC PTCH ENSIT NAVX STJU -G

## 2017-09-15 PROCEDURE — 77030004964 HC CBL EP ABLAT BSC -C

## 2017-09-15 PROCEDURE — 74011250636 HC RX REV CODE- 250/636: Performed by: ANESTHESIOLOGY

## 2017-09-15 PROCEDURE — 85347 COAGULATION TIME ACTIVATED: CPT

## 2017-09-15 PROCEDURE — 77030030261 HC CBL UMB ELEC DISP MEDT -C

## 2017-09-15 PROCEDURE — 76210000016 HC OR PH I REC 1 TO 1.5 HR

## 2017-09-15 PROCEDURE — 74011250636 HC RX REV CODE- 250/636

## 2017-09-15 PROCEDURE — 93613 INTRACARDIAC EPHYS 3D MAPG: CPT

## 2017-09-15 PROCEDURE — C1894 INTRO/SHEATH, NON-LASER: HCPCS

## 2017-09-15 PROCEDURE — 77030029065 HC DRSG HEMO QCLOT ZMED -B

## 2017-09-15 PROCEDURE — C1733 CATH, EP, OTHR THAN COOL-TIP: HCPCS

## 2017-09-15 PROCEDURE — 77030008543 HC TBNG MON PRSS MRTM -A

## 2017-09-15 PROCEDURE — 77030008684 HC TU ET CUF COVD -B: Performed by: ANESTHESIOLOGY

## 2017-09-15 PROCEDURE — 77030011640 HC PAD GRND REM COVD -A

## 2017-09-15 PROCEDURE — C1893 INTRO/SHEATH, FIXED,NON-PEEL: HCPCS

## 2017-09-15 PROCEDURE — 77030034850

## 2017-09-15 PROCEDURE — C1764 EVENT RECORDER, CARDIAC: HCPCS

## 2017-09-15 PROCEDURE — 77030026438 HC STYL ET INTUB CARD -A: Performed by: ANESTHESIOLOGY

## 2017-09-15 PROCEDURE — 77030029163 HC CBL EP DX ACHV DISP MEDT -C

## 2017-09-15 PROCEDURE — C1759 CATH, INTRA ECHOCARDIOGRAPHY: HCPCS

## 2017-09-15 PROCEDURE — 74011250636 HC RX REV CODE- 250/636: Performed by: INTERNAL MEDICINE

## 2017-09-15 PROCEDURE — 77030029359 HC PRB ESOPH TEMP CATH ANTM -F

## 2017-09-15 PROCEDURE — 74011636320 HC RX REV CODE- 636/320

## 2017-09-15 PROCEDURE — 76060000035 HC ANESTHESIA 2 TO 2.5 HR

## 2017-09-15 PROCEDURE — 77030020506 HC NDL TRNSPTL NRG BAYL -F

## 2017-09-15 PROCEDURE — C1730 CATH, EP, 19 OR FEW ELECT: HCPCS

## 2017-09-15 PROCEDURE — 77030013079 HC BLNKT BAIR HGGR 3M -A: Performed by: ANESTHESIOLOGY

## 2017-09-15 PROCEDURE — 77030016894 HC CBL EP DX CATH3 STJU -B

## 2017-09-15 PROCEDURE — 77030010880 HC CBL EP SUPRME STJU -C

## 2017-09-15 RX ORDER — MIDAZOLAM HYDROCHLORIDE 1 MG/ML
INJECTION, SOLUTION INTRAMUSCULAR; INTRAVENOUS
Status: DISPENSED
Start: 2017-09-15 | End: 2017-09-15

## 2017-09-15 RX ORDER — HEPARIN SODIUM 200 [USP'U]/100ML
2000 INJECTION, SOLUTION INTRAVENOUS ONCE
Status: COMPLETED | OUTPATIENT
Start: 2017-09-15 | End: 2017-09-15

## 2017-09-15 RX ORDER — DIPHENHYDRAMINE HYDROCHLORIDE 50 MG/ML
12.5 INJECTION, SOLUTION INTRAMUSCULAR; INTRAVENOUS AS NEEDED
Status: DISCONTINUED | OUTPATIENT
Start: 2017-09-15 | End: 2017-09-15 | Stop reason: HOSPADM

## 2017-09-15 RX ORDER — HEPARIN SODIUM 10000 [USP'U]/100ML
INJECTION, SOLUTION INTRAVENOUS
Status: DISCONTINUED
Start: 2017-09-15 | End: 2017-09-15

## 2017-09-15 RX ORDER — LIDOCAINE HYDROCHLORIDE AND EPINEPHRINE 10; 10 MG/ML; UG/ML
1-20 INJECTION, SOLUTION INFILTRATION; PERINEURAL
Status: DISCONTINUED | OUTPATIENT
Start: 2017-09-15 | End: 2017-09-15

## 2017-09-15 RX ORDER — PROPOFOL 10 MG/ML
INJECTION, EMULSION INTRAVENOUS AS NEEDED
Status: DISCONTINUED | OUTPATIENT
Start: 2017-09-15 | End: 2017-09-15 | Stop reason: HOSPADM

## 2017-09-15 RX ORDER — SODIUM CHLORIDE 0.9 % (FLUSH) 0.9 %
5-10 SYRINGE (ML) INJECTION AS NEEDED
Status: DISCONTINUED | OUTPATIENT
Start: 2017-09-15 | End: 2017-09-16 | Stop reason: HOSPADM

## 2017-09-15 RX ORDER — FENTANYL CITRATE 50 UG/ML
12.5-5 INJECTION, SOLUTION INTRAMUSCULAR; INTRAVENOUS
Status: DISCONTINUED | OUTPATIENT
Start: 2017-09-15 | End: 2017-09-15

## 2017-09-15 RX ORDER — SODIUM CHLORIDE 9 MG/ML
75 INJECTION, SOLUTION INTRAVENOUS CONTINUOUS
Status: DISCONTINUED | OUTPATIENT
Start: 2017-09-15 | End: 2017-09-16 | Stop reason: HOSPADM

## 2017-09-15 RX ORDER — MIDAZOLAM HYDROCHLORIDE 1 MG/ML
INJECTION, SOLUTION INTRAMUSCULAR; INTRAVENOUS AS NEEDED
Status: DISCONTINUED | OUTPATIENT
Start: 2017-09-15 | End: 2017-09-15 | Stop reason: HOSPADM

## 2017-09-15 RX ORDER — PROTAMINE SULFATE 10 MG/ML
INJECTION, SOLUTION INTRAVENOUS
Status: DISCONTINUED
Start: 2017-09-15 | End: 2017-09-16 | Stop reason: HOSPADM

## 2017-09-15 RX ORDER — HYDROCODONE BITARTRATE AND ACETAMINOPHEN 5; 325 MG/1; MG/1
1 TABLET ORAL
Status: DISCONTINUED | OUTPATIENT
Start: 2017-09-15 | End: 2017-09-16 | Stop reason: HOSPADM

## 2017-09-15 RX ORDER — LIDOCAINE HYDROCHLORIDE 20 MG/ML
INJECTION, SOLUTION EPIDURAL; INFILTRATION; INTRACAUDAL; PERINEURAL AS NEEDED
Status: DISCONTINUED | OUTPATIENT
Start: 2017-09-15 | End: 2017-09-15 | Stop reason: HOSPADM

## 2017-09-15 RX ORDER — MORPHINE SULFATE 4 MG/ML
INJECTION, SOLUTION INTRAMUSCULAR; INTRAVENOUS
Status: DISCONTINUED
Start: 2017-09-15 | End: 2017-09-16 | Stop reason: HOSPADM

## 2017-09-15 RX ORDER — FENTANYL CITRATE 50 UG/ML
INJECTION, SOLUTION INTRAMUSCULAR; INTRAVENOUS AS NEEDED
Status: DISCONTINUED | OUTPATIENT
Start: 2017-09-15 | End: 2017-09-15 | Stop reason: HOSPADM

## 2017-09-15 RX ORDER — ROCURONIUM BROMIDE 10 MG/ML
INJECTION, SOLUTION INTRAVENOUS AS NEEDED
Status: DISCONTINUED | OUTPATIENT
Start: 2017-09-15 | End: 2017-09-15 | Stop reason: HOSPADM

## 2017-09-15 RX ORDER — SODIUM CHLORIDE 0.9 % (FLUSH) 0.9 %
5-10 SYRINGE (ML) INJECTION EVERY 8 HOURS
Status: DISCONTINUED | OUTPATIENT
Start: 2017-09-15 | End: 2017-09-16 | Stop reason: HOSPADM

## 2017-09-15 RX ORDER — HEPARIN SODIUM 1000 [USP'U]/ML
INJECTION, SOLUTION INTRAVENOUS; SUBCUTANEOUS AS NEEDED
Status: DISCONTINUED | OUTPATIENT
Start: 2017-09-15 | End: 2017-09-15 | Stop reason: HOSPADM

## 2017-09-15 RX ORDER — ONDANSETRON 2 MG/ML
4 INJECTION INTRAMUSCULAR; INTRAVENOUS AS NEEDED
Status: DISCONTINUED | OUTPATIENT
Start: 2017-09-15 | End: 2017-09-15 | Stop reason: HOSPADM

## 2017-09-15 RX ORDER — HYDROCODONE BITARTRATE AND ACETAMINOPHEN 5; 325 MG/1; MG/1
1 TABLET ORAL AS NEEDED
Status: DISCONTINUED | OUTPATIENT
Start: 2017-09-15 | End: 2017-09-15 | Stop reason: HOSPADM

## 2017-09-15 RX ORDER — FENTANYL CITRATE 50 UG/ML
INJECTION, SOLUTION INTRAMUSCULAR; INTRAVENOUS
Status: COMPLETED
Start: 2017-09-15 | End: 2017-09-15

## 2017-09-15 RX ORDER — ACETAMINOPHEN 325 MG/1
650 TABLET ORAL
Status: DISCONTINUED | OUTPATIENT
Start: 2017-09-15 | End: 2017-09-16 | Stop reason: HOSPADM

## 2017-09-15 RX ORDER — METOPROLOL TARTRATE 25 MG/1
25 TABLET, FILM COATED ORAL DAILY
Status: DISCONTINUED | OUTPATIENT
Start: 2017-09-16 | End: 2017-09-16

## 2017-09-15 RX ORDER — SODIUM CHLORIDE 9 MG/ML
1000 INJECTION, SOLUTION INTRAVENOUS CONTINUOUS
Status: DISCONTINUED | OUTPATIENT
Start: 2017-09-15 | End: 2017-09-16 | Stop reason: HOSPADM

## 2017-09-15 RX ORDER — FERROUS SULFATE, DRIED 160(50) MG
1 TABLET, EXTENDED RELEASE ORAL
Status: DISCONTINUED | OUTPATIENT
Start: 2017-09-16 | End: 2017-09-16 | Stop reason: HOSPADM

## 2017-09-15 RX ORDER — SODIUM CHLORIDE, SODIUM LACTATE, POTASSIUM CHLORIDE, CALCIUM CHLORIDE 600; 310; 30; 20 MG/100ML; MG/100ML; MG/100ML; MG/100ML
100 INJECTION, SOLUTION INTRAVENOUS CONTINUOUS
Status: DISCONTINUED | OUTPATIENT
Start: 2017-09-15 | End: 2017-09-15 | Stop reason: HOSPADM

## 2017-09-15 RX ORDER — SODIUM CHLORIDE 9 MG/ML
INJECTION, SOLUTION INTRAVENOUS
Status: DISCONTINUED | OUTPATIENT
Start: 2017-09-15 | End: 2017-09-15 | Stop reason: HOSPADM

## 2017-09-15 RX ORDER — FENTANYL CITRATE 50 UG/ML
25 INJECTION, SOLUTION INTRAMUSCULAR; INTRAVENOUS
Status: DISCONTINUED | OUTPATIENT
Start: 2017-09-15 | End: 2017-09-15 | Stop reason: HOSPADM

## 2017-09-15 RX ORDER — PROTAMINE SULFATE 10 MG/ML
25-100 INJECTION, SOLUTION INTRAVENOUS
Status: DISCONTINUED | OUTPATIENT
Start: 2017-09-15 | End: 2017-09-15

## 2017-09-15 RX ORDER — PROTAMINE SULFATE 10 MG/ML
INJECTION, SOLUTION INTRAVENOUS AS NEEDED
Status: DISCONTINUED | OUTPATIENT
Start: 2017-09-15 | End: 2017-09-15 | Stop reason: HOSPADM

## 2017-09-15 RX ORDER — DOBUTAMINE HYDROCHLORIDE 200 MG/100ML
INJECTION INTRAVENOUS
Status: DISCONTINUED
Start: 2017-09-15 | End: 2017-09-16 | Stop reason: HOSPADM

## 2017-09-15 RX ORDER — DOBUTAMINE HYDROCHLORIDE 200 MG/100ML
INJECTION INTRAVENOUS
Status: DISCONTINUED | OUTPATIENT
Start: 2017-09-15 | End: 2017-09-15 | Stop reason: HOSPADM

## 2017-09-15 RX ORDER — MORPHINE SULFATE 4 MG/ML
INJECTION, SOLUTION INTRAMUSCULAR; INTRAVENOUS AS NEEDED
Status: DISCONTINUED | OUTPATIENT
Start: 2017-09-15 | End: 2017-09-15 | Stop reason: HOSPADM

## 2017-09-15 RX ORDER — HEPARIN SODIUM 1000 [USP'U]/ML
INJECTION, SOLUTION INTRAVENOUS; SUBCUTANEOUS
Status: DISCONTINUED | OUTPATIENT
Start: 2017-09-15 | End: 2017-09-15 | Stop reason: HOSPADM

## 2017-09-15 RX ORDER — SUCCINYLCHOLINE CHLORIDE 20 MG/ML
INJECTION INTRAMUSCULAR; INTRAVENOUS AS NEEDED
Status: DISCONTINUED | OUTPATIENT
Start: 2017-09-15 | End: 2017-09-15 | Stop reason: HOSPADM

## 2017-09-15 RX ORDER — MIDAZOLAM HYDROCHLORIDE 1 MG/ML
1 INJECTION, SOLUTION INTRAMUSCULAR; INTRAVENOUS AS NEEDED
Status: DISCONTINUED | OUTPATIENT
Start: 2017-09-15 | End: 2017-09-15 | Stop reason: HOSPADM

## 2017-09-15 RX ORDER — HEPARIN SODIUM 1000 [USP'U]/ML
30000 INJECTION, SOLUTION INTRAVENOUS; SUBCUTANEOUS ONCE
Status: DISCONTINUED | OUTPATIENT
Start: 2017-09-15 | End: 2017-09-15 | Stop reason: HOSPADM

## 2017-09-15 RX ORDER — FENTANYL CITRATE 50 UG/ML
50 INJECTION, SOLUTION INTRAMUSCULAR; INTRAVENOUS AS NEEDED
Status: DISCONTINUED | OUTPATIENT
Start: 2017-09-15 | End: 2017-09-15 | Stop reason: HOSPADM

## 2017-09-15 RX ORDER — MIDAZOLAM HYDROCHLORIDE 1 MG/ML
1-5 INJECTION, SOLUTION INTRAMUSCULAR; INTRAVENOUS
Status: DISCONTINUED | OUTPATIENT
Start: 2017-09-15 | End: 2017-09-15

## 2017-09-15 RX ORDER — HYDROMORPHONE HYDROCHLORIDE 1 MG/ML
0.5 INJECTION, SOLUTION INTRAMUSCULAR; INTRAVENOUS; SUBCUTANEOUS
Status: DISCONTINUED | OUTPATIENT
Start: 2017-09-15 | End: 2017-09-15 | Stop reason: HOSPADM

## 2017-09-15 RX ORDER — AMIODARONE HYDROCHLORIDE 200 MG/1
100 TABLET ORAL DAILY
Status: DISCONTINUED | OUTPATIENT
Start: 2017-09-16 | End: 2017-09-16 | Stop reason: HOSPADM

## 2017-09-15 RX ORDER — MIDAZOLAM HYDROCHLORIDE 1 MG/ML
0.5 INJECTION, SOLUTION INTRAMUSCULAR; INTRAVENOUS
Status: DISCONTINUED | OUTPATIENT
Start: 2017-09-15 | End: 2017-09-15 | Stop reason: HOSPADM

## 2017-09-15 RX ORDER — ONDANSETRON 2 MG/ML
INJECTION INTRAMUSCULAR; INTRAVENOUS AS NEEDED
Status: DISCONTINUED | OUTPATIENT
Start: 2017-09-15 | End: 2017-09-15 | Stop reason: HOSPADM

## 2017-09-15 RX ORDER — LIDOCAINE HYDROCHLORIDE 10 MG/ML
0.1 INJECTION, SOLUTION EPIDURAL; INFILTRATION; INTRACAUDAL; PERINEURAL AS NEEDED
Status: DISCONTINUED | OUTPATIENT
Start: 2017-09-15 | End: 2017-09-15 | Stop reason: HOSPADM

## 2017-09-15 RX ADMIN — Medication 10 ML: at 22:00

## 2017-09-15 RX ADMIN — FENTANYL CITRATE 100 MCG: 50 INJECTION, SOLUTION INTRAMUSCULAR; INTRAVENOUS at 12:15

## 2017-09-15 RX ADMIN — HEPARIN SODIUM 5000 UNITS: 1000 INJECTION, SOLUTION INTRAVENOUS; SUBCUTANEOUS at 13:00

## 2017-09-15 RX ADMIN — HEPARIN SODIUM 4500 UNITS/HR: 1000 INJECTION, SOLUTION INTRAVENOUS; SUBCUTANEOUS at 12:42

## 2017-09-15 RX ADMIN — ONDANSETRON 4 MG: 2 INJECTION INTRAMUSCULAR; INTRAVENOUS at 13:45

## 2017-09-15 RX ADMIN — MORPHINE SULFATE 2 MG: 4 INJECTION, SOLUTION INTRAMUSCULAR; INTRAVENOUS at 13:26

## 2017-09-15 RX ADMIN — MIDAZOLAM HYDROCHLORIDE 2 MG: 1 INJECTION, SOLUTION INTRAMUSCULAR; INTRAVENOUS at 12:08

## 2017-09-15 RX ADMIN — HEPARIN SODIUM 4000 UNITS: 200 INJECTION, SOLUTION INTRAVENOUS at 12:43

## 2017-09-15 RX ADMIN — DOBUTAMINE HYDROCHLORIDE 20 MCG/KG/MIN: 200 INJECTION INTRAVENOUS at 13:14

## 2017-09-15 RX ADMIN — SODIUM CHLORIDE 250 ML: 900 INJECTION, SOLUTION INTRAVENOUS at 22:00

## 2017-09-15 RX ADMIN — SUCCINYLCHOLINE CHLORIDE 100 MG: 20 INJECTION INTRAMUSCULAR; INTRAVENOUS at 12:17

## 2017-09-15 RX ADMIN — MORPHINE SULFATE 2 MG: 4 INJECTION, SOLUTION INTRAMUSCULAR; INTRAVENOUS at 13:15

## 2017-09-15 RX ADMIN — FENTANYL CITRATE 25 MCG: 50 INJECTION, SOLUTION INTRAMUSCULAR; INTRAVENOUS at 16:00

## 2017-09-15 RX ADMIN — IOPAMIDOL 50 ML: 755 INJECTION, SOLUTION INTRAVENOUS at 12:26

## 2017-09-15 RX ADMIN — FENTANYL CITRATE 25 MCG: 50 INJECTION, SOLUTION INTRAMUSCULAR; INTRAVENOUS at 15:45

## 2017-09-15 RX ADMIN — SODIUM CHLORIDE: 9 INJECTION, SOLUTION INTRAVENOUS at 12:08

## 2017-09-15 RX ADMIN — PROTAMINE SULFATE 75 MG: 10 INJECTION, SOLUTION INTRAVENOUS at 13:15

## 2017-09-15 RX ADMIN — FENTANYL CITRATE 25 MCG: 50 INJECTION, SOLUTION INTRAMUSCULAR; INTRAVENOUS at 15:30

## 2017-09-15 RX ADMIN — ROCURONIUM BROMIDE 10 MG: 10 INJECTION, SOLUTION INTRAVENOUS at 12:15

## 2017-09-15 RX ADMIN — FENTANYL CITRATE 25 MCG: 50 INJECTION, SOLUTION INTRAMUSCULAR; INTRAVENOUS at 15:20

## 2017-09-15 RX ADMIN — HEPARIN SODIUM 14000 UNITS: 1000 INJECTION, SOLUTION INTRAVENOUS; SUBCUTANEOUS at 12:42

## 2017-09-15 RX ADMIN — PROPOFOL 200 MG: 10 INJECTION, EMULSION INTRAVENOUS at 12:16

## 2017-09-15 RX ADMIN — LIDOCAINE HYDROCHLORIDE 20 MG: 20 INJECTION, SOLUTION EPIDURAL; INFILTRATION; INTRACAUDAL; PERINEURAL at 12:15

## 2017-09-15 NOTE — PROGRESS NOTES
Cardiac Cath Lab Recovery Arrival Note:      Suellen Paiz arrived to EP Lab. Staff introduced to patient. Patient identifiers verified with NAME and DATE OF BIRTH. Procedure verified with patient. Consent forms reviewed and signed by patient or authorized representative and verified. Allergies verified. Patient and family oriented to department. Patient and family informed of procedure and plan of care. Questions answered with review. Patient prepped for procedure, per orders from physician, prior to arrival.    Patient on cardiac monitor, non-invasive blood pressure, SPO2 monitor. On Room air. Patient is A&Ox 3. Patient reports no complaints of pain or discomfort. Patient in stretcher, in low position, with side rails up, call bell within reach, patient instructed to call if assistance as needed. Patient prep in: EP lab. Prep by:BARB Olea

## 2017-09-15 NOTE — PROCEDURES
[address]  [phone]    Indications and Pre-Procedure Diagnosis:  Gwendolyn Valdez is a 71 y.o. female with atrial fibrillation and atrial flutter is referred for electr-physiologic evaluation and intervention. Post Procedure Diagnosis    Atrial fibrillation  Atrial flutter    Atrial Fibrillation Ablation Summary  Informed consent was obtained. The patient was brought to the EP lab where ROGER demonstrated no thrombus in the left atrial appendage. All vascular access sites were prepped and draped in the usual sterile fashion and the Seldinger technique was used to catherize the RFV and LFV with multi-polar electrode catheters, which were placed in the appropriate intra-cardiac sites under fluoroscopic guidance (see catheter list). Right and left atrial pacing and recording, His bundle recording, and right ventricular pacing and recording were performed. Continuous pulse oximetry and cuff BP monitoring were performed. During the procedure, the patient received Versed, Fentanyl and Propofol for sedation per anesthesia personnel. Transeptal Puncture  A transeptal atrial puncture was performed using fluoroscopic and intracardiac ultrasound guidance. An SL1 sheath was dragged from the SVC along the septum until a sudden leftward displacement was observed. Engagement of the Fossa Ovalis was confirmed by the interatrial tenting seen under intracardiac ultrasound guidance. The [employer] NRG needle was advanced into the left atrium and its positioned confirmed with contrast injection. The dilator and sheath were advanced carefully over the needle into the body of the left atrium and the dilator/needle removed. A Flexcath sheath was exchanged over the wire for the SL1 sheath.  No pericardial effusion was appreciated on intracardiac ultrasound so a bolus injection of heparin 100 units/kg was administered, with a continuous heparin gtt of 5000 units/hr so that the activated clotting time maintained was between 300 and 400 seconds with additional boluses q 30 minutes as necessary. A 3D shell representing the left atrium and pulmonary veins was constructed with the electroanatomic mapping system. An Achieve circular mapping catheter was advanced into the left atrium through the Flexcath sheath and a map of the body of the LA and the pulmonary veins was created. Pulmonary vein venography was also performed at that time. A 28 mm Medtronic Cryo balloon was advanced into the left atrium. Cryo ablation of the left atrium was performed at the PV ostia to encircle the right and left PVs. Cryo energy was applied for a total of 16 minutes with confirmed entrance/exit block of four pulmonary veins. Atrial Flutter Ablation  The Flexcath sheath was pulled back to the right atrium and the cryoballoon was removed. A large curve 10 mm tip Blazer catheter was used to deliver 4 RF lesions for a total of 3 minutes in the cavo-tricuspid isthmus with creation of bi-directional isthmus block. Autonomic manipulation with Dobutamine @ 20 mcg/min was performed during this procedure. Post ablation, rapid atrial pacing to 240 msec, on and off dobutamine, failed to induce any atrial arrhythmias. At the end of the procedure, all catheters were removed, heparin reversed, groin sheaths pulled and hemostasis achieved. The patient left the laboratory in a stable condition. Fluoroscopy and procedure times were 7 and 60 minutes respectively. Estimated blood loss: <10 ml. Sharp counts: correct. Specimen (s) collected: none. The procedure related complication occurred: none. The following problems were encountered: none. Conduction intervals (ms)    A-A A-H H-V P-R QRS Q-T R-R V-V  1158 108 48 157 82 [id number]    AV arturo conduction    VA Block when pacing at 490 ms    Findings and Summary    This study demonstrates:  1. Successful PVI of all 4 PVs  2.  Atrial flutter ablation with successful RFA of the CTI with confirmed bi-directional isthmus block  3. No further inducible atrial arrhythmias on dobutamine with rapid atrial pacing    Recommendation:  1. [facility]  2. ILR    Thank you for allowing me to participate in this patients care.     Amalia Wolfe MD, Trent Sykes

## 2017-09-15 NOTE — PERIOP NOTES
TMonitor  Registered Nurse  Nelson Jacome - OUT REPORT:    Verbal report given to Lizzie WAGGONER(name) on Antonette Forward  being transferred to 2163(unit) for routine progression of care       Report consisted of patients Situation, Background, Assessment and   Recommendations(SBAR). Information from the following report(s) OR Summary, Procedure Summary, Intake/Output and MAR was reviewed with the receiving nurse.     Opportunity for questions and clarification was provided2    Patient transported with:   Monitor  O2 @ 2 liters  Registered Nurse  [facility]

## 2017-09-15 NOTE — IP AVS SNAPSHOT
[address] [facility] [address] [phone] Patient: Kranthi Pina MRN: [id number] TYV:[id number]/30/[id number] You are allergic to the following No active allergies Recent Documentation Height Weight BMI OB Status Smoking Status 1.626 m 70.8 kg 26.78 kg/m2 Postmenopausal Former Smoker Emergency Contacts Name Discharge Info Relation Home Work Mobile Holly Alcantar CAREGIVER [3] Spouse [3] [phone] About your hospitalization You were admitted on:  September 15, 2017 You last received care in the:  [facility] 2 INTRVNTNL CARDIO You were discharged on:  September 16, 2017 Unit phone number:  [phone] Why you were hospitalized Your primary diagnosis was:  Paroxysmal Atrial Fibrillation (Hcc) Your diagnoses also included:  S/P Ablation Of Atrial Fibrillation, Status Post Placement Of Implantable Loop Recorder Providers Seen During Your Hospitalizations Provider Role Specialty Primary office phone Trina Goins MD Attending Provider Cardiology [phone] Your Primary Care Physician (PCP) Primary Care Physician Office Phone Office Fax Gregoriaetta Copper [address] [place] [address] Follow-up Information Follow up With Details Comments Contact Info Ayaz Woods MD   [address] 
[phone] Trina Goins MD  please call and make a follow-up for 1 week from now [address] [place] [address] 83. [phone] Your Appointments Thursday October 05, 2017  2:30 PM EDT  
ESTABLISHED PATIENT with Liliana Crigler, NP [facility] [address]) [address] [phone] Thursday October 05, 2017  2:30 PM EDT  
PACEMAKER with PACEMAKER, [facility] [address]) [address] [facility]  
[phone] Current Discharge Medication List  
  
START taking these medications Dose & Instructions Dispensing Information Comments Morning Noon Evening Bedtime  
 metoprolol succinate 25 mg XL tablet Commonly known as:  TOPROL-XL Your last dose was: Your next dose is:    
   
   
 Dose:  12.5 mg Take 0.5 Tabs by mouth daily. Replaces metoprolol tartrate due to 24 hour effect, refills per Dr. Mia Johnson Quantity:  90 Tab Refills:  0 CONTINUE these medications which have NOT CHANGED Dose & Instructions Dispensing Information Comments Morning Noon Evening Bedtime  
 amiodarone 200 mg tablet Commonly known as:  CORDARONE Your last dose was: Your next dose is:    
   
   
 Dose:  200 mg Take 1 Tab by mouth daily. Quantity:  30 Tab Refills:  1  
     
   
   
   
  
 aspirin delayed-release 81 mg tablet Your last dose was: Your next dose is: Take  by mouth daily. Refills:  0  
     
   
   
   
  
 CALCIUM 600 + D 600-125 mg-unit Tab Generic drug:  calcium-cholecalciferol (d3) Your last dose was: Your next dose is:    
   
   
 Dose:  1 Tab Take 1 Tab by mouth daily. Refills:  0  
     
   
   
   
  
 rivaroxaban 20 mg Tab tablet Commonly known as:  Elesa Hoof Your last dose was: Your next dose is:    
   
   
 Dose:  20 mg Take 1 Tab by mouth daily. Quantity:  30 Tab Refills:  2 STOP taking these medications   
 metoprolol tartrate 25 mg tablet Commonly known as:  LOPRESSOR Where to Get Your Medications These medications were sent to [address], [address], [address] [place] [address] Way Hours:  24-hours Phone:  [phone] metoprolol succinate 25 mg XL tablet Discharge Instructions [address] [address]  [phone] ABLATION DISCHARGE INSTRUCTIONS Patient ID: 
Blayne Lindsey [id number] 
81 y.o. 
1945 Admit Date: 9/15/2017 Discharge Date: 9/15/2017 Admitting Physician: Eduardo Aquino MD  
 
Discharge Physician: Eduardo Aquino MD 
 
Admission Diagnoses:  
a fib Recovery Needed in PACU A-fib ([facility] [address] Discharge Diagnoses: Active Problems: 
  A-fib ([address] (9/15/2017) Discharge Condition: Good Cardiology Procedures this Admission:  AF ablation Disposition: home Reference discharge instructions provided by nursing for diet and activity. Follow-up with Dr Slava Gold in one week. Call [phone] to make an appointment. Signed: 
Eduardo Aquino MD 
9/15/2017 
12:32 PM 
 
S/P ABLATION DISCHARGE INSTRUCTIONS It is normal to feel tired the first couple days. Take it easy and follow the physicians instructions. CHECK THE CATHETER INSERTION SITE DAILY: 
You may shower 24 hours after the procedure, remove the bandage during showering. Wash with soap and water and pat dry. Gentle cleaning of the site with soap and water is sufficient, cover with a dry clean dressing or bandage. Do not apply creams or powders to the area. Do not sit in a bathtub or pool of water for 7 days or until wound has completely healed. Temporary bruising and discomfort is normal and may last a few weeks. You may have a  formation of a small lump at the site which may last up to 6 weeks. CALL THE PHYSICIAN: If the site becomes red, swollen or feels warm to the touch If there is bleeding or drainage or if there is unusual pain at the groin or down the leg. If there is any bleeding, lie down, apply pressure or have someone apply pressure with a clean cloth until the bleeding stops. If the bleeding continues, call 911 to be transported to the hospital. 
DO  [place] [address] Activity: For the first 24-48 hours or as instructed by the physician: No lifting, pushing or pulling over 5 pounds and no straining the insertion site. Do not life grocery bags or the garbage can, do not run the vacuum  or  for 7 days. Start with short walks as in the hospital and gradually increase as tolerated each day. It is recommended to walk 30 minutes 5-7 days per week. Follow your physicians instructions on activity. Avoid walking outside in extremes of heat or cold. Walk inside when it is cold and windy or hot and humid. Things to keep in mind: 
No driving for at least 5 days or as designated by your physician. Limit the number of times you go up and down the stairs Take rests and pace yourself with activity. Be careful and do not strain with bowel movements. Medications: Take all medications as prescribed Call your physician if you have any questions Keep an updated list of your medications with you at all times and give a list to your physician and pharmacist 
 
Signs and Symptoms: 
Be cautious of symptoms of angina or recurrent symptoms such as chest discomfort, unusual shortness of breath or fatigue, palpitations. After Care: Follow up with your physician as instructed. Follow a heart healthy diet with proper portion control, daily stress management, daily exercise, blood pressure and cholesterol control , and smoking cessation. LOOP/LINQ RECORDER DISCHARGE INSTRUCTIONS 1. Carry you ID card for your Loop Recorder with you at all times. This card will be given to you in the hospital or mailed to you. 2. Call for an appointment in 2 weeks [phone]. 3. The Loop Recorder will bulge slightly under your skin.    Please notify the doctor's office if you notice any of the following around your Loop recorder site: A.  A bruise that does not go away B. Soreness or yellow, green, or brown drainage from the site. C. Any swelling from the site. D. If you have a fever of 100 degrees or higher that lasts for a few days 4. You will receive instructions on the use of your loop recorder. INCISION CARE 1.  Leave dressing over your site for at least 2 days 2. Leave steri-strips over your site until they start to fall off.  
3.   You may shower after as long as your incision isnt submerged or directly sprayed upon until well healed. 4.  For comfort, wear loose fitting clothing. 5.  Ice pack to affected shoulder for first 24 hours. 6.  Report any signs of infection, fever, pain, swelling, redness, oozing, or heat at site especially if these symptoms increase after the first 3 to 4 days. ACTIVITY PRECAUTIONS 1. Avoid rough contact with the implant site. 2. Any extreme activity such as golf, weight lifting or exercise biking should be restricted for 30 days. 3. Do not carry objects by holding them against your implant site. Don't carry a telephone or other transmitting device in a pocket in front of your heart monitor or in a shoulder bag near your heart monitor. SPECIAL PRECAUTIONS 1. You may have an MRI. 2. Advise dentist or other medical personnel you see that you have a  Loop recorder. 6.  Cell phones and microwave oven use is okay. 7.  If you plan to move or take a trip to a new area, the doctor's office will give you a name of a doctor to contact for any problems. Discharge Orders None Introducing [facility]! [facility] introduces [employer] patient portal. Now you can access parts of your medical record, email your doctor's office, and request medication refills online. 1. In your internet browser, go to [email or web address] 2. Click on the First Time User? Click Here link in the Sign In box.  You will see the New Member Sign Up page. 3. Enter your e-channel Access Code exactly as it appears below. You will not need to use this code after youve completed the sign-up process. If you do not sign up before the expiration date, you must request a new code. · e-channel Access Code: [email or web address] Expires: 10/9/2017  2:42 PM 
 
4. Enter the last four digits of your Social Security Number (xxxx) and Date of Birth (mm/dd/yyyy) as indicated and click Submit. You will be taken to the next sign-up page. 5. Create a e-channel ID. This will be your e-channel login ID and cannot be changed, so think of one that is secure and easy to remember. 6. Create a e-channel password. You can change your password at any time. 7. Enter your Password Reset Question and Answer. This can be used at a later time if you forget your password. 8. Enter your e-mail address. You will receive e-mail notification when new information is available in [address] 9. Click Sign Up. You can now view and download portions of your medical record. 10. Click the Download Summary menu link to download a portable copy of your medical information. If you have questions, please visit the Frequently Asked Questions section of the e-channel website. Remember, e-channel is NOT to be used for urgent needs. For medical emergencies, dial 911. Now available from your iPhone and Android! General Information Please provide this summary of care documentation to your next provider. Patient Signature:  ____________________________________________________________ Date:  ____________________________________________________________  
  
Brittany Freire Provider Signature:  ____________________________________________________________ Date:  ____________________________________________________________

## 2017-09-15 NOTE — PROCEDURES
[address] [address]  [phone]    Indications and Pre-Procedure Diagnosis:  Blayne Lindsey is a 71 y.o. female with AF is referred for implantable loop recorder. Post Procedure Diagnosis:  Atrial fibrillation    Loop Recorder Implant Procedure and Findings:  Informed consent was obtained. The procedure was performed under local anesthesia. Continuous pulse oximetry and cuff pressure were monitored. During the procedure, the patient received Versed and Fentanyl for sedation. The mid-chest area was prepped and draped in the usual sterile fashion and was liberally infiltrated with 1% lidocaine. An incision was made and the device implanted. Manual pressure was held until hemostasis was achieved. Total procedure times was 15 minutes. Estimated blood loss <10 ml. Sharp count: correct. Specimen(s) collected: none. The following procedure related complication occurred: none. The following problems were encountered: none. Findings: successful loop recorder placement. Final Programmed Parameters  VT  180 bpm  VF  210 bpm  Josh  40 bpm  Asystole  ON  AT/AF  ON    Supplies Summary available in the chart  [employer]    Thank you for allowing me to participate in this patients care.     Lupillo Alexis MD, Jabier Kat

## 2017-09-15 NOTE — INTERVAL H&P NOTE
H&P Update:  Osiel Agudelo was seen and examined. History and physical has been reviewed. The patient has been examined.  There have been no significant clinical changes since the completion of the originally dated History and Physical.    Signed By: Asif Perez MD     September 15, 2017 9:40 AM

## 2017-09-15 NOTE — DISCHARGE INSTRUCTIONS
[address]  [phone]        [facility] INSTRUCTIONS    Patient ID:  Jodee Argue  [id number]  17 y.o.  1945    Admit Date: 9/15/2017    Discharge Date: 9/15/2017     Admitting Physician: Cheryn Jeans, MD     Discharge Physician: Cheryn Jeans, MD    Admission Diagnoses:   a fib  Recovery Needed in PACU  [facility])    Discharge Diagnoses: Active Problems:    [facility]) (9/15/2017)        Discharge Condition: Good    Cardiology Procedures this Admission:  AF ablation    Disposition: home    Reference discharge instructions provided by nursing for diet and activity. Follow-up with Dr Lucy Bajwa in one week. Call [phone] to make an appointment. Signed:  Cheryn Jeans, MD  9/15/2017  12:32 PM    S/P ABLATION DISCHARGE INSTRUCTIONS    It is normal to feel tired the first couple days. Take it easy and follow the physicians instructions. CHECK THE CATHETER INSERTION SITE DAILY:  You may shower 24 hours after the procedure, remove the bandage during showering. Wash with soap and water and pat dry. Gentle cleaning of the site with soap and water is sufficient, cover with a dry clean dressing or bandage. Do not apply creams or powders to the area. Do not sit in a bathtub or pool of water for 7 days or until wound has completely healed. Temporary bruising and discomfort is normal and may last a few weeks. You may have a  formation of a small lump at the site which may last up to 6 weeks. CALL THE PHYSICIAN:  If the site becomes red, swollen or feels warm to the touch  If there is bleeding or drainage or if there is unusual pain at the groin or down the leg. If there is any bleeding, lie down, apply pressure or have someone apply pressure with a clean cloth until the bleeding stops.   If the bleeding continues, call [phone] to be transported to the hospital.  DO NOT DRIVE YOURSELF, Nat[place] 911.    Activity:      For the first 24-48 hours or as instructed by the physician:  No lifting, pushing or pulling over 5 pounds and no straining the insertion site. Do not life grocery bags or the garbage can, do not run the vacuum  or  for 7 days. Start with short walks as in the hospital and gradually increase as tolerated each day. It is recommended to walk 30 minutes 5-7 days per week. Follow your physicians instructions on activity. Avoid walking outside in extremes of heat or cold. Walk inside when it is cold and windy or hot and humid. Things to keep in mind:  No driving for at least 5 days or as designated by your physician. Limit the number of times you go up and down the stairs  Take rests and pace yourself with activity. Be careful and do not strain with bowel movements. Medications: Take all medications as prescribed  Call your physician if you have any questions  Keep an updated list of your medications with you at all times and give a list to your physician and pharmacist    Signs and Symptoms:  Be cautious of symptoms of angina or recurrent symptoms such as chest discomfort, unusual shortness of breath or fatigue, palpitations. After Care: Follow up with your physician as instructed. Follow a heart healthy diet with proper portion control, daily stress management, daily exercise, blood pressure and cholesterol control , and smoking cessation. LOOP/LINQ RECORDER DISCHARGE INSTRUCTIONS    1. Carry you ID card for your Loop Recorder with you at all times. This card will be given to you in the hospital or mailed to you. 2. Call for an appointment in 2 weeks [phone]. 3. The Loop Recorder will bulge slightly under your skin. Please notify the doctor's office if you notice any of the following around your Loop recorder site:  A.  A bruise that does not go away  B. Soreness or yellow, green, or brown drainage from the site. C.   Any swelling from the site.  D. If you have a fever of 100 degrees or higher that lasts for a few days  4. You will receive instructions on the use of your loop recorder. INCISION CARE       1.  Leave dressing over your site for at least 2 days  2. Leave steri-strips over your site until they start to fall off.   3.   You may shower after as long as your incision isnt submerged or directly sprayed upon until well healed. 4.  For comfort, wear loose fitting clothing. 5.  Ice pack to affected shoulder for first 24 hours. 6.  Report any signs of infection, fever, pain, swelling, redness, oozing, or heat at site especially if these symptoms increase after the first 3 to 4 days. ACTIVITY PRECAUTIONS     1. Avoid rough contact with the implant site. 2. Any extreme activity such as golf, weight lifting or exercise biking should be restricted for 30 days. 3. Do not carry objects by holding them against your implant site. Don't carry a telephone or other transmitting device in a pocket in front of your heart monitor or in a shoulder bag near your heart monitor. SPECIAL PRECAUTIONS     1. You may have an MRI. 2. Advise dentist or other medical personnel you see that you have a  Loop recorder. 6.  Cell phones and microwave oven use is okay. 7.  If you plan to move or take a trip to a new area, the doctor's office will give you a name of a doctor to contact for any problems.

## 2017-09-15 NOTE — ANESTHESIA PREPROCEDURE EVALUATION
Anesthetic History   No history of anesthetic complications            Review of Systems / Medical History  Patient summary reviewed, nursing notes reviewed and pertinent labs reviewed    Pulmonary  Within defined limits                 Neuro/Psych   Within defined limits           Cardiovascular            Dysrhythmias : atrial fibrillation      Exercise tolerance: >4 METS     GI/Hepatic/Renal               Comments: screening Endo/Other  Within defined limits           Other Findings              Physical Exam    Airway  Mallampati: I  TM Distance: > 6 cm    Mouth opening: Normal     Cardiovascular    Rhythm: regular  Rate: normal         Dental  No notable dental hx       Pulmonary  Breath sounds clear to auscultation               Abdominal  Abdominal exam normal       Other Findings            Anesthetic Plan    ASA: 2  Anesthesia type: general    Monitoring Plan: BIS      Induction: Intravenous  Anesthetic plan and risks discussed with: Patient

## 2017-09-15 NOTE — H&P (VIEW-ONLY)
Subjective:      Dominic Ferguson is a 71 y.o. female is here for EP consult. She has been having palpitations on and off since June. She was in [place] and went into the ER post a syncopal episode - she was in AF with rvr. She is back in AF and feeling tired. Patient Active Problem List    Diagnosis Date Noted    Heart rate fast 09/11/2017    Paroxysmal atrial fibrillation ([address] 09/11/2017      Berna Short MD  No past medical history on file. Past Surgical History:   Procedure Laterality Date    BREAST SURGERY PROCEDURE UNLISTED      breadt biopsy left    COLONOSCOPY N/A 7/11/2017    COL[facility] Red [facility] at [facility] ENDOSCOPY    HX ORTHOPAEDIC      bunionectomy right foot     No Known Allergies   No family history on file. negative for cardiac disease  Social History     Social History    Marital status:      Spouse name: N/A    Number of children: N/A    Years of education: N/A     Social History Main Topics    Smoking status: Former Smoker     Packs/day: 0.50     Years: 6.00     Quit date: 9/11/1970    Smokeless tobacco: Never Used    Alcohol use Yes      Comment: occasional wine    Drug use: No    Sexual activity: Not Asked     Other Topics Concern    None     Social History Narrative     Current Outpatient Prescriptions   Medication Sig    metoprolol tartrate (LOPRESSOR) 25 mg tablet Take 25 mg by mouth daily.  amiodarone (CORDARONE) 200 mg tablet Take 1 Tab by mouth daily.  rivaroxaban (XARELTO) 20 mg tab tablet Take 1 Tab by mouth daily.  calcium-cholecalciferol, d3, (CALCIUM 600 + D) 600-125 mg-unit tab Take 1 Tab by mouth daily.  aspirin delayed-release 81 mg tablet Take  by mouth daily. No current facility-administered medications for this visit.        Vitals:    09/12/17 0928   BP: 98/70   Pulse: 76   Resp: 16   SpO2: 98%   Weight: 158 lb 8 oz (71.9 kg)   Height: 5' 4.5\" (1.638 m)       I have reviewed the nurses notes, vitals, problem list, allergy list, medical history, family, social history and medications. Review of Symptoms:    General: Pt denies excessive weight gain or loss. Pt is able to conduct ADL's  HEENT: Denies blurred vision, headaches, epistaxis and difficulty swallowing. Respiratory: + shortness of breath, denies RUANO, wheezing or stridor. Cardiovascular: +palpitations  Gastrointestinal: Denies poor appetite, indigestion, abdominal pain or blood in stool  Urinary: Denies dysuria, pyuria  Musculoskeletal: Denies pain or swelling from muscles or joints  Neurologic: Denies tremor, paresthesias, or sensory motor disturbance  Skin: Denies rash, itching or texture change. Psych: Denies depression      Physical Exam:      General: Well developed, in no acute distress. HEENT: Eyes - PERRL, no jvd  Heart:  irr irr, tachy  Respiratory: Clear bilaterally x 4, no wheezing or rales  Abdomen:   Soft, non-tender, bowel sounds are active.   Extremities:  No edema, normal cap refill, no cyanosis. Musculoskeletal: No clubbing  Neuro: A&Ox3, speech clear, gait stable. Skin: Skin color is normal. No rashes or lesions. Non diaphoretic  Vascular: 2+ pulses symmetric in all extremities    Cardiographics    Ekg: afib with rvr    Echo - nl lvef, nl la size    No results found for this or any previous visit. No results found for: WBC, HGBPOC, HGB, HGBP, HCTPOC, HCT, PHCT, RBCH, PLT, MCV, HGBEXT, HCTEXT, PLTEXT   No results found for: NA, K, CL, CO2, AGAP, GLU, BUN, CREA, BUCR, GFRAA, GFRNA, CA, TBIL, TBILI, GPT, SGOT, AP, TP, ALB, GLOB, AGRAT, ALT      Assessment:     Assessment:        ICD-10-CM ICD-9-CM    1. Paroxysmal atrial fibrillation (HCC) I48.0 427.31 AMB POC EKG ROUTINE W/ 12 LEADS, INTER & REP      PROTHROMBIN TIME + INR      METABOLIC PANEL, COMPREHENSIVE      MAGNESIUM      CBC W/O DIFF      XR CHEST PA LAT      CTA CHEST W OR W WO CONT   2.  Palpitation R00.2 785.1      Orders Placed This Encounter    XR CHEST PA LAT Standing Status:   Future     Standing Expiration Date:   10/12/2018     Order Specific Question:   Reason for Exam     Answer:   PVI    CTA CHEST W OR W WO CONT     Standing Status:   Future     Standing Expiration Date:   10/12/2018     Order Specific Question:   Is Patient Allergic to Contrast Dye? Answer:   Unknown     Order Specific Question:   Stat POC Creatinine as needed for Radiology Policy     Answer: Yes    PROTHROMBIN TIME + INR    METABOLIC PANEL, COMPREHENSIVE    MAGNESIUM    CBC W/O DIFF    AMB POC EKG ROUTINE W/ 12 LEADS, INTER & REP     Order Specific Question:   Reason for Exam:     Answer:   Routine        Plan:   Ms Abe España is a pleasant lady with a chadsvasc score of 2 (female, age of 73) with symptomatic atrial fibrillation. She is a candidate for an afib ablation/ILR. I discussed the risks/benefits/alternatives of the procedure with the patient. Risks include (but are not limited to) bleeding, heart block, infection, cva/mi/tamponade/esophageal perforation/pv stenosis/death. The patient understands that there is a 8-3% major complication rate and agrees to proceed. Thank you for this interesting consultation. Continue medical management for AF. Thank you for allowing me to participate in Stevenamysha Zepeda 's care.     Noemí Caballero MD, Aliyah Philippe

## 2017-09-15 NOTE — ROUTINE PROCESS
TRANSFER - IN REPORT:    Verbal report received from Gino Zabala Dr RN(name) on Mat Boots  being received from EP Lab(unit) for routine post - op      Report consisted of patients Situation, Background, Assessment and   Recommendations(SBAR). Information from the following report(s) Procedure Summary was reviewed with the receiving nurse. Opportunity for questions and clarification was provided. Assessment completed upon patients arrival to unit and care assumed.

## 2017-09-15 NOTE — ANESTHESIA POSTPROCEDURE EVALUATION
Post-Anesthesia Evaluation and Assessment    Patient: Fany Marquis MRN: [id number]  SSN: [id number]    YOB: 1945  Age: 71 y.o. Sex: female       Cardiovascular Function/Vital Signs  Visit Vitals    /60    Pulse (!) 56    Temp 36.4 °C (97.6 °F)    Resp 12    Ht 5' 4\" (1.626 m)    Wt 70.8 kg (156 lb)    SpO2 100%    BMI 26.78 kg/m2       Patient is status post general anesthesia for * No procedures listed *. Nausea/Vomiting: None    Postoperative hydration reviewed and adequate. Pain:  Pain Scale 1: Numeric (0 - 10) (09/15/17 1445)  Pain Intensity 1: 3 (09/15/17 1445)   Managed    Neurological Status:   Neuro (WDL): Exceptions to WDL (09/15/17 1405)  Neuro  Neurologic State: Drowsy; Eyes open to stimulus; Eyes open to voice;Sleeping (09/15/17 1405)  Orientation Level: Oriented to person;Oriented to place;Oriented to situation (09/15/17 1405)  Cognition: Follows commands (09/15/17 1405)  Speech: Clear (09/15/17 1405)  LUE Motor Response: Purposeful (09/15/17 1405)  LLE Motor Response: Purposeful (09/15/17 1405)  RUE Motor Response: Purposeful (09/15/17 1405)  RLE Motor Response: Purposeful (09/15/17 1405)   At baseline    Mental Status and Level of Consciousness: Arousable    Pulmonary Status:   O2 Device: Nasal cannula (09/15/17 1405)   Adequate oxygenation and airway patent    Complications related to anesthesia: None    Post-anesthesia assessment completed.  No concerns    Signed By: Jesus Tmo MD     September 15, 2017

## 2017-09-15 NOTE — PROGRESS NOTES
Patient had josue removed and up to the bathroom with RN present. Blood pressure still soft, so will continue to monitor and patient. Pt. Feeling weaker but no other complications with ambulating.

## 2017-09-16 VITALS
SYSTOLIC BLOOD PRESSURE: 92 MMHG | BODY MASS INDEX: 26.63 KG/M2 | DIASTOLIC BLOOD PRESSURE: 50 MMHG | WEIGHT: 156 LBS | HEIGHT: 64 IN | OXYGEN SATURATION: 95 % | HEART RATE: 73 BPM | RESPIRATION RATE: 16 BRPM | TEMPERATURE: 98.2 F

## 2017-09-16 PROBLEM — Z95.818 STATUS POST PLACEMENT OF IMPLANTABLE LOOP RECORDER: Status: ACTIVE | Noted: 2017-09-16

## 2017-09-16 PROBLEM — Z98.890 S/P ABLATION OF ATRIAL FIBRILLATION: Status: ACTIVE | Noted: 2017-09-16

## 2017-09-16 PROBLEM — Z86.79 S/P ABLATION OF ATRIAL FIBRILLATION: Status: ACTIVE | Noted: 2017-09-16

## 2017-09-16 LAB
ANION GAP SERPL CALC-SCNC: 7 MMOL/L (ref 5–15)
BUN SERPL-MCNC: 12 MG/DL (ref 6–20)
BUN/CREAT SERPL: 21 (ref 12–20)
CALCIUM SERPL-MCNC: 7.1 MG/DL (ref 8.5–10.1)
CHLORIDE SERPL-SCNC: 108 MMOL/L (ref 97–108)
CO2 SERPL-SCNC: 22 MMOL/L (ref 21–32)
CREAT SERPL-MCNC: 0.58 MG/DL (ref 0.55–1.02)
GLUCOSE SERPL-MCNC: 90 MG/DL (ref 65–100)
POTASSIUM SERPL-SCNC: 4.2 MMOL/L (ref 3.5–5.1)
SODIUM SERPL-SCNC: 137 MMOL/L (ref 136–145)

## 2017-09-16 PROCEDURE — 99218 HC RM OBSERVATION: CPT

## 2017-09-16 PROCEDURE — 74011250637 HC RX REV CODE- 250/637: Performed by: INTERNAL MEDICINE

## 2017-09-16 PROCEDURE — 80048 BASIC METABOLIC PNL TOTAL CA: CPT | Performed by: INTERNAL MEDICINE

## 2017-09-16 PROCEDURE — 36415 COLL VENOUS BLD VENIPUNCTURE: CPT | Performed by: INTERNAL MEDICINE

## 2017-09-16 RX ORDER — METOPROLOL SUCCINATE 25 MG/1
12.5 TABLET, EXTENDED RELEASE ORAL DAILY
Qty: 90 TAB | Refills: 0 | Status: SHIPPED | OUTPATIENT
Start: 2017-09-16 | End: 2018-03-05 | Stop reason: SDUPTHER

## 2017-09-16 RX ORDER — METOPROLOL TARTRATE 25 MG/1
12.5 TABLET, FILM COATED ORAL DAILY
Status: DISCONTINUED | OUTPATIENT
Start: 2017-09-16 | End: 2017-09-16 | Stop reason: HOSPADM

## 2017-09-16 RX ORDER — METOPROLOL TARTRATE 25 MG/1
12.5 TABLET, FILM COATED ORAL DAILY
Status: DISCONTINUED | OUTPATIENT
Start: 2017-09-17 | End: 2017-09-16

## 2017-09-16 RX ADMIN — Medication 10 ML: at 06:00

## 2017-09-16 RX ADMIN — CALCIUM CARBONATE 500 MG (1,250 MG)-VITAMIN D3 200 UNIT TABLET 1 TABLET: at 08:45

## 2017-09-16 RX ADMIN — AMIODARONE HYDROCHLORIDE 100 MG: 200 TABLET ORAL at 08:45

## 2017-09-16 RX ADMIN — METOPROLOL TARTRATE 25 MG: 25 TABLET ORAL at 09:00

## 2017-09-16 RX ADMIN — RIVAROXABAN 20 MG: 20 TABLET, FILM COATED ORAL at 08:45

## 2017-09-16 RX ADMIN — METOPROLOL TARTRATE 12.5 MG: 25 TABLET ORAL at 10:09

## 2017-09-16 NOTE — PROGRESS NOTES
9/16/2017 10:52 AM    Admit Date: 9/15/2017    Admit Diagnosis:   a fib;Recovery Needed in PACU; A-fib [facility])    Subjective:     Vahid Vernon denies chest pain or shortness of breath. Blood pressure in the 90s, but she notes that her blood pressure always runs low. She feels great and wants to go home. Current Facility-Administered Medications   Medication Dose Route Frequency    metoprolol tartrate (LOPRESSOR) tablet 12.5 mg  12.5 mg Oral DAILY    morphine 4 mg/mL injection        protamine 10 mg/mL injection        DOBUTamine (DOBUTREX) 500 mg/250 mL (2,000 mcg/mL) infusion        0.9% sodium chloride infusion 1,000 mL  1,000 mL IntraVENous CONTINUOUS    sodium chloride (NS) flush 5-10 mL  5-10 mL IntraVENous Q8H    sodium chloride (NS) flush 5-10 mL  5-10 mL IntraVENous PRN    acetaminophen (TYLENOL) tablet 650 mg  650 mg Oral Q4H PRN    HYDROcodone-acetaminophen (NORCO) 5-325 mg per tablet 1 Tab  1 Tab Oral Q4H PRN    amiodarone (CORDARONE) tablet 100 mg  100 mg Oral DAILY    calcium-vitamin D (OS-CHIDI) 500 mg-200 unit tablet  1 Tab Oral DAILY WITH BREAKFAST    rivaroxaban (XARELTO) tablet 20 mg  20 mg Oral DAILY WITH BREAKFAST    0.9% sodium chloride infusion  75 mL/hr IntraVENous CONTINUOUS         Objective:      Physical Exam:    Visit Vitals    BP 92/50 (BP 1 Location: Left arm, BP Patient Position: At rest)    Pulse 73    Temp 98.2 °F (36.8 °C)    Resp 16    Ht 5' 4\" (1.626 m)    Wt 156 lb (70.8 kg)    SpO2 95%    BMI 26.78 kg/m2     Gen:  NAD  Mental Status - Alert. General Appearance - Not in acute distress. Chest and Lung Exam   Inspection: Accessory muscles - No use of accessory muscles in breathing. Auscultation:   Breath sounds: - Normal.   Cardiovascular   Inspection: Jugular vein - Bilateral - Inspection Normal.   Palpation/Percussion:   Apical Impulse: - Normal.   Auscultation: Rhythm - Regular. Heart Sounds - S1 WNL and S2 WNL. No S3 or S4.    Murmurs & Other Heart Sounds: Auscultation of the heart reveals - No Murmurs. Peripheral Vascular   Upper Extremity: Inspection - Bilateral - No Cyanotic nailbeds or Digital clubbing. Lower Extremity:   Palpation: Edema - Bilateral - No edema. Bilateral groins no bruit or hematoma    Abdomen:   Soft, non-tender, bowel sounds are active. Neuro: A&O times 3, CN and motor grossly WNL    Data Review:   No results for input(s): WBC, HGB, HCT, PLT, HGBEXT, HCTEXT, PLTEXT, HGBEXT, HCTEXT, PLTEXT in the last 72 hours. Recent Labs      09/16/17   0318   NA  137   K  4.2   CL  108   CO2  22   GLU  90   BUN  12   CREA  0.58   CA  7.1*       No results for input(s): TROIQ, CPK, CKMB in the last 72 hours. Intake/Output Summary (Last 24 hours) at 09/16/17 1054  Last data filed at 09/15/17 1715   Gross per 24 hour   Intake             1600 ml   Output              205 ml   Net             1395 ml        Telemetry: normal sinus rhythm    Assessment:     Principal Problem:    Paroxysmal atrial fibrillation ([address] (9/11/2017)    Active Problems:    S/P ablation of atrial fibrillation (9/16/2017)      Status post placement of implantable loop recorder (9/16/2017)        Plan:     Doing well s/p AF ablation and implantation of ILR. No CP/ SOB, access site or neuro complaints. Ambulating. D/C today- see discharge instructions.

## 2017-09-16 NOTE — PROGRESS NOTES
Patient ambulated in hallway without difficulty. Dressing CDI. No complaints. Discharge instructions reviewed with patient; to be discharged to home with . Site care instructions reviewed; site(s) CDI. Patient instructed on which medications to continue, which to start, and which to stop. Prescriptions sent to patient's pharmacy. Medication info provided and reviewed with patient. Follow-up appointment information given; follow-up appointment to be made by patient. IV and tele box removed. Opportunity for questions provided; all questions answered. All belongings returned. Patient wheeled to front door via wheelchair by nurse; to be transported home by .

## 2017-10-04 ENCOUNTER — TELEPHONE (OUTPATIENT)
Dept: CARDIOLOGY CLINIC | Age: 72
End: 2017-10-04

## 2017-10-04 NOTE — TELEPHONE ENCOUNTER
Verified patient with two identifiers. Spoke with pt, she is having periods of fluctuating BP and HR. After walking yesterday she had a hard time getting a deep breath, with some pain. BP's this am  96/68, 87/73, 88/72, 110/67 , 146, 142, 155, 96. She has an appt tomorrow with Zelalem Donato. Do you want to change anything now or wait until tomorow? Please advise. ,

## 2017-10-04 NOTE — TELEPHONE ENCOUNTER
Pt when walking on yesterday, experience a pain, not a chest pain, when breathing. Please call.   Thanks

## 2017-10-04 NOTE — TELEPHONE ENCOUNTER
Verified patient with two identifiers. Spoke with pt advising her to relax today, not to do a lot, take HR occasionally, if it goes up in the 140's 150's she will need to go to ED. Pt verbalized understanding. If pt does not go to ED she will come into office tomorrow.          Melisa Sears, SANTIAGO   You 2 minutes ago (11:43 AM)                 i'd prefer her HR be low 100s, not 130-150s all day and through tomorrow. If it is sustained that high, she should go to ED        [address], SANTIAGO Bronson LPN        Caller: Unspecified (Today,  8:30 AM)                     She is in and out of fib most likely. Her bps normally run in the 33R systolic.  If she is not having chest pain, dyspnea or dizziness, she can see Riley Avilez tomorrow- may need amiodarone changed. If she feels worse, she should go to ED.

## 2017-10-05 ENCOUNTER — CLINICAL SUPPORT (OUTPATIENT)
Dept: CARDIOLOGY CLINIC | Age: 72
End: 2017-10-05

## 2017-10-05 ENCOUNTER — OFFICE VISIT (OUTPATIENT)
Dept: CARDIOLOGY CLINIC | Age: 72
End: 2017-10-05

## 2017-10-05 VITALS
BODY MASS INDEX: 26.98 KG/M2 | HEIGHT: 64 IN | SYSTOLIC BLOOD PRESSURE: 110 MMHG | HEART RATE: 103 BPM | WEIGHT: 158 LBS | DIASTOLIC BLOOD PRESSURE: 70 MMHG | RESPIRATION RATE: 16 BRPM | OXYGEN SATURATION: 99 %

## 2017-10-05 DIAGNOSIS — Z95.818 STATUS POST PLACEMENT OF IMPLANTABLE LOOP RECORDER: ICD-10-CM

## 2017-10-05 DIAGNOSIS — I48.91 ATRIAL FIBRILLATION, UNSPECIFIED TYPE (HCC): Primary | ICD-10-CM

## 2017-10-05 DIAGNOSIS — Z86.79 S/P ABLATION OF ATRIAL FIBRILLATION: Primary | ICD-10-CM

## 2017-10-05 DIAGNOSIS — Z86.79 S/P ABLATION OF ATRIAL FIBRILLATION: ICD-10-CM

## 2017-10-05 DIAGNOSIS — I48.0 PAROXYSMAL ATRIAL FIBRILLATION (HCC): ICD-10-CM

## 2017-10-05 DIAGNOSIS — Z98.890 S/P ABLATION OF ATRIAL FIBRILLATION: ICD-10-CM

## 2017-10-05 DIAGNOSIS — Z98.890 S/P ABLATION OF ATRIAL FIBRILLATION: Primary | ICD-10-CM

## 2017-10-05 NOTE — PROGRESS NOTES
Chief Complaint   Patient presents with    Irregular Heart Beat     f/u for ablation    Shortness of Breath     on exertion

## 2017-10-05 NOTE — PROGRESS NOTES
Subjective:      Saloni Hooks is a 71 y.o. female is here for follow up s/p AF ablation. She is doing well although had recurrence with palpitations/shortness of breath that started yesterday. The patient denies chest pain, orthopnea, PND, LE edema, syncope, presyncope or fatigue. Patient Active Problem List    Diagnosis Date Noted    S/P ablation of atrial fibrillation 09/16/2017    Status post placement of implantable loop recorder 09/16/2017    A-fib ([facility] 09/15/2017    Heart rate fast 09/11/2017    Paroxysmal atrial fibrillation ([facility] 09/11/2017      Anju Escobedo MD  No past medical history on file. Past Surgical History:   Procedure Laterality Date    BREAST SURGERY PROCEDURE UNLISTED      breadt biopsy left    COLONOSCOPY N/A 7/11/2017    COLONOSCOPY performed by Whitney Rocha MD at [facility] ENDOSCOPY    HX ORTHOPAEDIC      bunionectomy right foot     No Known Allergies   No family history on file. negative for cardiac disease  Social History     Social History    Marital status:      Spouse name: N/A    Number of children: N/A    Years of education: N/A     Social History Main Topics    Smoking status: Former Smoker     Packs/day: 0.50     Years: 6.00     Quit date: 9/11/1970    Smokeless tobacco: Never Used    Alcohol use Yes      Comment: occasional wine    Drug use: No    Sexual activity: Not Asked     Other Topics Concern    None     Social History Narrative     Current Outpatient Prescriptions   Medication Sig    metoprolol succinate (TOPROL-XL) 25 mg XL tablet Take 0.5 Tabs by mouth daily. Replaces metoprolol tartrate due to 24 hour effect, refills per Dr. Miriam Lion amiodarone (CORDARONE) 200 mg tablet Take 1 Tab by mouth daily.  rivaroxaban (XARELTO) 20 mg tab tablet Take 1 Tab by mouth daily.  calcium-cholecalciferol, d3, (CALCIUM 600 + D) 600-125 mg-unit tab Take 1 Tab by mouth daily.  aspirin delayed-release 81 mg tablet Take  by mouth daily. No current facility-administered medications for this visit. Vitals:    10/05/17 1409   BP: 110/70   Pulse: (!) 103   Resp: 16   SpO2: 99%   Weight: 158 lb (71.7 kg)   Height: 5' 4\" (1.626 m)       I have reviewed the nurses notes, vitals, problem list, allergy list, medical history, family, social history and medications. Review of Symptoms:    General: Pt denies excessive weight gain or loss. Pt is able to conduct ADL's  HEENT: Denies blurred vision, headaches, epistaxis and difficulty swallowing. Respiratory: +shortness of breath, Denies wheezing or stridor. Cardiovascular: +palpitations, Denies precordial pain, edema or PND  Gastrointestinal: Denies poor appetite, indigestion, abdominal pain or blood in stool  Urinary: Denies dysuria, pyuria  Musculoskeletal: Denies pain or swelling from muscles or joints  Neurologic: Denies tremor, paresthesias, or sensory motor disturbance  Skin: Denies rash, itching or texture change. Psych: Denies depression      Physical Exam:      General: Well developed, in no acute distress. HEENT: Eyes - PERRL, no jvd  Heart:  Normal S1/S2 negative S3 or S4. Regular, no murmur, gallop or rub.   Respiratory: Clear bilaterally x 4, no wheezing or rales  Abdomen:   Soft, non-tender, bowel sounds are active.   Extremities:  No edema, normal cap refill, no cyanosis. Musculoskeletal: No clubbing  Neuro: A&Ox3, speech clear, gait stable. Skin: Skin color is normal. No rashes or lesions. Non diaphoretic  Vascular: 2+ pulses symmetric in all extremities    Cardiographics    Ekg: sinus rhythm with PACs  ILR: 4.3% AF, 10/4-10/5     No results found for this or any previous visit.       Lab Results   Component Value Date/Time    WBC 7.5 09/12/2017 11:56 AM    HGB 14.3 09/12/2017 11:56 AM    HCT 44.0 09/12/2017 11:56 AM    PLATELET 767 32/03/4073 11:56 AM    MCV 94 09/12/2017 11:56 AM      Lab Results   Component Value Date/Time    Sodium 137 09/16/2017 03:18 AM    Potassium 4.2 09/16/2017 03:18 AM    Chloride 108 09/16/2017 03:18 AM    CO2 22 09/16/2017 03:18 AM    Anion gap 7 09/16/2017 03:18 AM    Glucose 90 09/16/2017 03:18 AM    BUN 12 09/16/2017 03:18 AM    Creatinine 0.58 09/16/2017 03:18 AM    BUN/Creatinine ratio 21 09/16/2017 03:18 AM    GFR est AA >60 09/16/2017 03:18 AM    GFR est non-AA >60 09/16/2017 03:18 AM    Calcium 7.1 09/16/2017 03:18 AM    Bilirubin, total 0.4 09/12/2017 11:56 AM    AST (SGOT) 23 09/12/2017 11:56 AM    Alk. phosphatase 80 09/12/2017 11:56 AM    Protein, total 6.6 09/12/2017 11:56 AM    Albumin 4.2 09/12/2017 11:56 AM    A-G Ratio 1.8 09/12/2017 11:56 AM    ALT (SGPT) 16 09/12/2017 11:56 AM         Assessment:     Assessment:        ICD-10-CM ICD-9-CM    1. Atrial fibrillation, unspecified type (HCC) I48.91 427.31 AMB POC EKG ROUTINE W/ 12 LEADS, INTER & REP   2. S/P ablation of atrial fibrillation Z98.890 V45.89     Z86.79     3. Status post placement of implantable loop recorder Z95.818 V45.09      Orders Placed This Encounter    AMB POC EKG ROUTINE W/ 12 LEADS, INTER & REP     Order Specific Question:   Reason for Exam:     Answer:   Routine        Plan:   Ms. Kodak Znuiga is here for follow up s/p AF ablation and ILR implant. She had been feeling well but had recurrence of symptomatic AF yesterday. EKG today demonstrates normal sinus rhythm with PACs. Her ILR shows 49 episodes of AF 10/4-10/5/2017, longest episode lasting 3 hr 22 minutes. We discussed increased metoprolol dose during healing phase and monitoring blood pressure for hypotension; however, she prefers to continue current medical therapy. She will notify us should symptoms recur/progress. Continue amiodarone and Xarelto. Follow up with Dr. Katty Solis in 3 months. Continue medical management for AF. Thank you for allowing me to participate in Anisa Cruz 's care.     Willy Mckeon, NP

## 2017-10-05 NOTE — MR AVS SNAPSHOT
Visit Information Date & Time Provider Department Dept. Phone Encounter #  
 10/5/2017  2:30 PM Luna Velez, [address] [address] [facility] [phone] [id number] Your Appointments 1/30/2018  2:45 PM  
3 MONTH with Brandin Cuba MD  
[facility] [address]) Appt Note: $0CP 10/5/17ksr [id number] [facility]  
[phone] [address] [facility] Upcoming Health Maintenance Date Due Hepatitis C Screening 1945 DTaP/Tdap/Td series (1 - Tdap) 4/23/1967 BREAST CANCER SCRN MAMMOGRAM 4/23/1996 FOBT Q 1 YEAR AGE 50-75 4/23/1996 ZOSTER VACCINE AGE 60> 2/23/2006 GLAUCOMA SCREENING Q2Y 4/23/2011 OSTEOPOROSIS SCREENING (DEXA) 4/23/2011 Pneumococcal 65+ Low/Medium Risk (1 of 2 - PCV13) 4/23/2011 MEDICARE YEARLY EXAM 4/23/2011 INFLUENZA AGE 9 TO ADULT 8/1/2017 Allergies as of 10/5/2017  Review Complete On: 10/5/2017 By: Priscila So No Known Allergies Current Immunizations  Never Reviewed No immunizations on file. Not reviewed this visit You Were Diagnosed With   
  
 Codes Comments Atrial fibrillation, unspecified type ([facility]    -  Primary ICD-10-CM: I48.91 
ICD-9-CM: 427.31 Vitals BP Pulse Resp Height(growth percentile) Weight(growth percentile) SpO2  
 110/70 (BP 1 Location: Right arm, BP Patient Position: Sitting) (!) 103 16 5' 4\" (1.626 m) 158 lb (71.7 kg) 99% BMI OB Status Smoking Status 27.12 kg/m2 Postmenopausal Former Smoker Vitals History BMI and BSA Data Body Mass Index Body Surface Area  
 27.12 kg/m 2 1.8 m 2 Preferred Pharmacy Pharmacy Name Phone [id number] [address], [address] [facility] [phone] Your Updated Medication List  
  
   
This list is accurate as of: 10/5/17  3:12 PM.  Always use your most recent med list.  
  
  
  
  
 amiodarone 200 mg tablet Commonly known as:  CORDARONE Take 1 Tab by mouth daily. aspirin delayed-release 81 mg tablet Take  by mouth daily. CALCIUM 600 + D 600-125 mg-unit Tab Generic drug:  calcium-cholecalciferol (d3) Take 1 Tab by mouth daily. metoprolol succinate 25 mg XL tablet Commonly known as:  TOPROL-XL Take 0.5 Tabs by mouth daily. Replaces metoprolol tartrate due to 24 hour effect, refills per Dr. Susana Carr  
  
 rivaroxaban 20 mg Tab tablet Commonly known as:  Arvora Rubio Take 1 Tab by mouth daily. We Performed the Following AMB POC EKG ROUTINE W/ 12 LEADS, INTER & REP [82796 CPT(R)] Introducing [facility] SERVICES! Dear Andreas Elder: Thank you for requesting a [employer] account. Our records indicate that you already have an active [employer] account. You can access your account anytime at [email or web address] Did you know that you can access your hospital and ER discharge instructions at any time in [employer]? You can also review all of your test results from your hospital stay or ER visit. Additional Information If you have questions, please visit the Frequently Asked Questions section of the [employer] website at [email or web address] Remember, [employer] is NOT to be used for urgent needs. For medical emergencies, dial 911. Now available from your iPhone and Android! Please provide this summary of care documentation to your next provider. Your primary care clinician is listed as Mikey Conteh. If you have any questions after today's visit, please call [phone].

## 2017-10-23 ENCOUNTER — TELEPHONE (OUTPATIENT)
Dept: CARDIOLOGY CLINIC | Age: 72
End: 2017-10-23

## 2017-10-23 NOTE — TELEPHONE ENCOUNTER
Pt would like to know if she could have a 3mo refill on her Xarelto. Please call patient when available, she has questions regarding refills.     Thanks

## 2017-10-23 NOTE — TELEPHONE ENCOUNTER
Rx sent to pharmacy, rescheduled patient's appt from end of January to 1/2/18 due to patient going to [place] in January. Added ILR device check to appt as well.

## 2017-11-01 ENCOUNTER — TELEPHONE (OUTPATIENT)
Dept: CARDIOLOGY CLINIC | Age: 72
End: 2017-11-01

## 2017-11-01 RX ORDER — AMIODARONE HYDROCHLORIDE 200 MG/1
200 TABLET ORAL DAILY
Qty: 30 TAB | Refills: 2 | Status: SHIPPED | OUTPATIENT
Start: 2017-11-01 | End: 2018-01-02

## 2017-11-01 NOTE — TELEPHONE ENCOUNTER
Per the last note, pt needs to continue Amiodarone until f/u. Will send further refills. LM on patient's home phone letting her know about medication refill and continuation.

## 2017-11-01 NOTE — TELEPHONE ENCOUNTER
Please call, she wants to know if she can stop taking amiodarone, she only has a 13 day supply left.     Thanks,

## 2017-12-12 ENCOUNTER — OFFICE VISIT (OUTPATIENT)
Dept: CARDIOLOGY CLINIC | Age: 72
End: 2017-12-12

## 2017-12-12 DIAGNOSIS — I48.91 ATRIAL FIBRILLATION, UNSPECIFIED TYPE (HCC): Primary | ICD-10-CM

## 2018-01-02 ENCOUNTER — OFFICE VISIT (OUTPATIENT)
Dept: CARDIOLOGY CLINIC | Age: 73
End: 2018-01-02

## 2018-01-02 ENCOUNTER — CLINICAL SUPPORT (OUTPATIENT)
Dept: CARDIOLOGY CLINIC | Age: 73
End: 2018-01-02

## 2018-01-02 VITALS
HEIGHT: 64 IN | BODY MASS INDEX: 26.98 KG/M2 | RESPIRATION RATE: 16 BRPM | HEART RATE: 89 BPM | DIASTOLIC BLOOD PRESSURE: 74 MMHG | WEIGHT: 158 LBS | OXYGEN SATURATION: 91 % | SYSTOLIC BLOOD PRESSURE: 120 MMHG

## 2018-01-02 DIAGNOSIS — I48.3 TYPICAL ATRIAL FLUTTER (HCC): ICD-10-CM

## 2018-01-02 DIAGNOSIS — I48.0 PAROXYSMAL ATRIAL FIBRILLATION (HCC): ICD-10-CM

## 2018-01-02 DIAGNOSIS — I48.91 ATRIAL FIBRILLATION, UNSPECIFIED TYPE (HCC): Primary | ICD-10-CM

## 2018-01-02 DIAGNOSIS — Z95.818 STATUS POST PLACEMENT OF IMPLANTABLE LOOP RECORDER: ICD-10-CM

## 2018-01-02 DIAGNOSIS — Z86.79 S/P ABLATION OF ATRIAL FIBRILLATION: Primary | ICD-10-CM

## 2018-01-02 DIAGNOSIS — R55 SYNCOPE AND COLLAPSE: ICD-10-CM

## 2018-01-02 DIAGNOSIS — Z98.890 S/P ABLATION OF ATRIAL FIBRILLATION: Primary | ICD-10-CM

## 2018-01-02 NOTE — PROGRESS NOTES
Chief Complaint   Patient presents with   [address] no cardiac complaints. 1. Have you been to the ER, urgent care clinic since your last visit? Hospitalized since your last visit? No    2. Have you seen or consulted any other health care providers outside of the [address] [place] [address] since your last visit? Include any pap smears or colon screening.  No

## 2018-01-02 NOTE — MR AVS SNAPSHOT
Visit Information Date & Time Provider Department Dept. Phone Encounter #  
 1/2/2018 10:00 AM Jersey Perry, [address] [facility] [phone] [id number] Follow-up Instructions Return in about 3 months (around 4/2/2018). Follow-up and Disposition History 1/12/2018  8:00 AM  
REMOTE OFFICE VISIT with [facility] [address]) Appt Note: NOT A OFFICE VISIT  REMOTE MDT ILR  
 [phone] [facility]  
[phone] [address] [facility] Upcoming Health Maintenance Date Due Hepatitis C Screening 1945 DTaP/Tdap/Td series (1 - Tdap) 4/23/1967 FOBT Q 1 YEAR AGE 50-75 4/23/1996 ZOSTER VACCINE AGE 60> 2/23/2006 GLAUCOMA SCREENING Q2Y 4/23/2011 OSTEOPOROSIS SCREENING (DEXA) 4/23/2011 Pneumococcal 65+ Low/Medium Risk (1 of 2 - PCV13) 4/23/2011 MEDICARE YEARLY EXAM 4/23/2011 Influenza Age 5 to Adult 8/1/2017 Allergies as of 1/2/2018  Review Complete On: 1/2/2018 By: Gomez Gray MD  
 No Known Allergies Current Immunizations  Never Reviewed No immunizations on file. Not reviewed this visit You Were Diagnosed With   
  
 Codes Comments Atrial fibrillation, unspecified type ([facility]    -  Primary ICD-10-CM: I48.91 
ICD-9-CM: 427.31 Syncope and collapse     ICD-10-CM: R55 
ICD-9-CM: 780. 2 Typical atrial flutter (HCC)     ICD-10-CM: I48.3 ICD-9-CM: 427.32 Vitals BP Pulse Resp Height(growth percentile) Weight(growth percentile) SpO2  
 120/74 (BP 1 Location: Left arm, BP Patient Position: Sitting) 89 16 5' 4\" (1.626 m) 158 lb (71.7 kg) 91% BMI OB Status Smoking Status 27.12 kg/m2 Postmenopausal Former Smoker Vitals History BMI and BSA Data Body Mass Index Body Surface Area  
 27.12 kg/m 2 1.8 m 2 Preferred Pharmacy Pharmacy Name Phone [id number] Roland Linder, [address] [facility] [phone] Your Updated Medication List  
  
   
This list is accurate as of: 1/2/18 11:05 AM.  Always use your most recent med list.  
  
  
  
  
 aspirin delayed-release 81 mg tablet Take  by mouth daily. CALCIUM 600 + D 600-125 mg-unit Tab Generic drug:  calcium-cholecalciferol (d3) Take 1 Tab by mouth daily. FLUZONE HIGH-DOSE 2017-18 (PF) Syrg injection Generic drug:  influenza vaccine 2017-18 (65 yrs+)(PF)  
TO BE ADMINISTERED BY PHARMACIST FOR IMMUNIZATION  
  
 metoprolol succinate 25 mg XL tablet Commonly known as:  TOPROL-XL Take 0.5 Tabs by mouth daily. Replaces metoprolol tartrate due to 24 hour effect, refills per Dr. Yordy Cooley  
  
 rivaroxaban 20 mg Tab tablet Commonly known as:  Vergie Bina Take 1 Tab by mouth daily. We Performed the Following AMB POC EKG ROUTINE W/ 12 LEADS, INTER & REP [48881 CPT(R)] Follow-up Instructions Return in about 3 months (around 4/2/2018). Introducing [facility] SERVICES! Dear Dinora Santiago: Thank you for requesting a [email or web address] account. Our records indicate that you already have an active [email or web address] account. You can access your account anytime at [email or web address] Did you know that you can access your hospital and ER discharge instructions at any time in [email or web address]? You can also review all of your test results from your hospital stay or ER visit. Additional Information If you have questions, please visit the Frequently Asked Questions section of the [email or web address] website at [email or web address] Remember, [email or web address] is NOT to be used for urgent needs. For medical emergencies, dial 911. Now available from your iPhone and Android! Please provide this summary of care documentation to your next provider. Your primary care clinician is listed as Marilyn Beard.  If you have any questions after today's visit, please call [phone].

## 2018-01-02 NOTE — PROGRESS NOTES
Subjective:      Sal Augustin is a 71 y.o. female is here for f/u of her AF and AFL. The patient denies chest pain/ shortness of breath, orthopnea, PND, LE edema, palpitations, syncope, presyncope or fatigue. Patient Active Problem List    Diagnosis Date Noted    S/P ablation of atrial fibrillation 09/16/2017    Status post placement of implantable loop recorder 09/16/2017    A-fib ([facility] 09/15/2017    Heart rate fast 09/11/2017    Paroxysmal atrial fibrillation ([facility] 09/11/2017      Amelia Frye MD  History reviewed. No pertinent past medical history. Past Surgical History:   Procedure Laterality Date    BREAST SURGERY PROCEDURE UNLISTED      breadt biopsy left    COLONOSCOPY N/A 7/11/2017    COLONOSCOPY performed by Ton Bethea MD at [address] [place] [address] ENDOSCOPY    HX ORTHOPAEDIC      bunionectomy right foot     No Known Allergies   Family History   Problem Relation Age of Onset    High Cholesterol Mother     Coronary Artery Disease Brother     High Cholesterol Brother     negative for cardiac disease  Social History     Social History    Marital status:      Spouse name: N/A    Number of children: N/A    Years of education: N/A     Social History Main Topics    Smoking status: Former Smoker     Packs/day: 0.50     Years: 6.00     Quit date: 9/11/1970    Smokeless tobacco: Never Used    Alcohol use Yes      Comment: occasional wine    Drug use: No    Sexual activity: Not Asked     Other Topics Concern    None     Social History Narrative     Current Outpatient Prescriptions   Medication Sig    FLUZONE HIGH-DOSE 2017-18, PF, syrg injection TO BE ADMINISTERED BY PHARMACIST FOR IMMUNIZATION    rivaroxaban (XARELTO) 20 mg tab tablet Take 1 Tab by mouth daily.  metoprolol succinate (TOPROL-XL) 25 mg XL tablet Take 0.5 Tabs by mouth daily.  Replaces metoprolol tartrate due to 24 hour effect, refills per Dr. Wallace Lauren calcium-cholecalciferol, d3, (CALCIUM 600 + D) 600-125 mg-unit tab Take 1 Tab by mouth daily.  aspirin delayed-release 81 mg tablet Take  by mouth daily. No current facility-administered medications for this visit. Vitals:    01/02/18 1040   BP: 120/74   Pulse: 89   Resp: 16   SpO2: 91%   Weight: 158 lb (71.7 kg)   Height: 5' 4\" (1.626 m)       I have reviewed the nurses notes, vitals, problem list, allergy list, medical history, family, social history and medications. Review of Symptoms:    General: Pt denies excessive weight gain or loss. Pt is able to conduct ADL's  HEENT: Denies blurred vision, headaches, epistaxis and difficulty swallowing. Respiratory: Denies shortness of breath, RUANO, wheezing or stridor. Cardiovascular: Denies precordial pain, palpitations, edema or PND  Gastrointestinal: Denies poor appetite, indigestion, abdominal pain or blood in stool  Urinary: Denies dysuria, pyuria  Musculoskeletal: Denies pain or swelling from muscles or joints  Neurologic: Denies tremor, paresthesias, or sensory motor disturbance  Skin: Denies rash, itching or texture change. Psych: Denies depression      Physical Exam:      General: Well developed, in no acute distress. HEENT: Eyes - PERRL, no jvd  Heart:  Normal S1/S2 negative S3 or S4. Regular, no murmur, gallop or rub.   Respiratory: Clear bilaterally x 4, no wheezing or rales  Abdomen:   Soft, non-tender, bowel sounds are active.   Extremities:  No edema, normal cap refill, no cyanosis. Musculoskeletal: No clubbing  Neuro: A&Ox3, speech clear, gait stable. Skin: Skin color is normal. No rashes or lesions. Non diaphoretic  Vascular: 2+ pulses symmetric in all extremities    Cardiographics    Ekg: nsr    ilr - no AF or AFL    No results found for this or any previous visit.       Lab Results   Component Value Date/Time    WBC 7.5 09/12/2017 11:56 AM    HGB 14.3 09/12/2017 11:56 AM    HCT 44.0 09/12/2017 11:56 AM    PLATELET 532 88/73/3343 11:56 AM    MCV 94 09/12/2017 11:56 AM      Lab Results   Component Value Date/Time    Sodium 137 09/16/2017 03:18 AM    Potassium 4.2 09/16/2017 03:18 AM    Chloride 108 09/16/2017 03:18 AM    CO2 22 09/16/2017 03:18 AM    Anion gap 7 09/16/2017 03:18 AM    Glucose 90 09/16/2017 03:18 AM    BUN 12 09/16/2017 03:18 AM    Creatinine 0.58 09/16/2017 03:18 AM    BUN/Creatinine ratio 21 09/16/2017 03:18 AM    GFR est AA >60 09/16/2017 03:18 AM    GFR est non-AA >60 09/16/2017 03:18 AM    Calcium 7.1 09/16/2017 03:18 AM    Bilirubin, total 0.4 09/12/2017 11:56 AM    AST (SGOT) 23 09/12/2017 11:56 AM    Alk. phosphatase 80 09/12/2017 11:56 AM    Protein, total 6.6 09/12/2017 11:56 AM    Albumin 4.2 09/12/2017 11:56 AM    A-G Ratio 1.8 09/12/2017 11:56 AM    ALT (SGPT) 16 09/12/2017 11:56 AM         Assessment:     Assessment:        ICD-10-CM ICD-9-CM    1. Atrial fibrillation, unspecified type ([facility] [address] I48.91 427.31 AMB POC EKG ROUTINE W/ 12 LEADS, INTER & REP   2. Syncope and collapse R55 780.2    3. Typical atrial flutter (HCC) I48.3 427.32      Orders Placed This Encounter    AMB POC EKG ROUTINE W/ 12 LEADS, INTER & REP     Order Specific Question:   Reason for Exam:     Answer:   routine    FLUZONE HIGH-DOSE 2017-18, PF, syrg injection     Sig: TO BE ADMINISTERED BY PHARMACIST FOR IMMUNIZATION     Refill:  0        Plan:   Ms Carlotta Vazquez is feeling great. She is in sinus and asymptomatic. We will stop her amiodarone. She will cont on her [address] and bb for now. She will f/u in 3 months. Continue medical management for AF/AFL. Thank you for allowing me to participate in Jatinder Neigh 's care.     Chase Castro MD, Ivey Halsted

## 2018-01-12 ENCOUNTER — OFFICE VISIT (OUTPATIENT)
Dept: CARDIOLOGY CLINIC | Age: 73
End: 2018-01-12

## 2018-01-12 DIAGNOSIS — I48.91 ATRIAL FIBRILLATION, UNSPECIFIED TYPE (HCC): Primary | ICD-10-CM

## 2018-02-12 ENCOUNTER — OFFICE VISIT (OUTPATIENT)
Dept: CARDIOLOGY CLINIC | Age: 73
End: 2018-02-12

## 2018-02-12 DIAGNOSIS — I48.91 ATRIAL FIBRILLATION, UNSPECIFIED TYPE (HCC): Primary | ICD-10-CM

## 2018-03-08 RX ORDER — METOPROLOL SUCCINATE 25 MG/1
12.5 TABLET, EXTENDED RELEASE ORAL DAILY
Qty: 90 TAB | Refills: 3 | Status: SHIPPED | OUTPATIENT
Start: 2018-03-08 | End: 2018-03-08 | Stop reason: SDUPTHER

## 2018-03-09 NOTE — TELEPHONE ENCOUNTER
From: Lucia Diaz  To: [id number] MD  Sent: 3/8/2018 7:45 PM EST  Subject: Medication Renewal Request    Original authorizing provider: [id number] Brigitte Jay. Myrtle Meter would like a refill of the following medications:  metoprolol succinate (TOPROL-XL) 25 mg XL tablet [Lanette Rojas MD]    Preferred pharmacy: [address] [place] [address]    Comment:  The last time I was in your office in January 2018 I gave the  the new [facility] address for my prescriptions. I called the new pharmacy at [address] [facility] and asked them to refill the Metoprolol Succinate 25 mg. They said they would call you for authorization. Your message tells me it was filled at a different address. I will call [facility] to find out which pharmacy actually filled it. Please note the change of [facility] pharmacy. thank you.  [place]

## 2018-03-12 RX ORDER — METOPROLOL SUCCINATE 25 MG/1
12.5 TABLET, EXTENDED RELEASE ORAL DAILY
Qty: 90 TAB | Refills: 3 | Status: SHIPPED | OUTPATIENT
Start: 2018-03-12 | End: 2019-06-06 | Stop reason: SDUPTHER

## 2018-03-14 ENCOUNTER — APPOINTMENT (OUTPATIENT)
Age: 73
Setting detail: DERMATOLOGY
End: 2018-03-19

## 2018-03-14 DIAGNOSIS — L81.4 OTHER MELANIN HYPERPIGMENTATION: ICD-10-CM

## 2018-03-14 DIAGNOSIS — Z71.89 OTHER SPECIFIED COUNSELING: ICD-10-CM

## 2018-03-14 DIAGNOSIS — L57.0 ACTINIC KERATOSIS: ICD-10-CM

## 2018-03-14 DIAGNOSIS — L40.3 PUSTULOSIS PALMARIS ET PLANTARIS: ICD-10-CM

## 2018-03-14 PROCEDURE — OTHER RECOMMENDATIONS: OTHER

## 2018-03-14 PROCEDURE — OTHER PRESCRIPTION: OTHER

## 2018-03-14 PROCEDURE — OTHER MIPS QUALITY: OTHER

## 2018-03-14 PROCEDURE — 17003 DESTRUCT PREMALG LES 2-14: CPT

## 2018-03-14 PROCEDURE — 17000 DESTRUCT PREMALG LESION: CPT

## 2018-03-14 PROCEDURE — OTHER COUNSELING: OTHER

## 2018-03-14 PROCEDURE — 99213 OFFICE O/P EST LOW 20 MIN: CPT | Mod: 25

## 2018-03-14 PROCEDURE — OTHER LIQUID NITROGEN: OTHER

## 2018-03-14 PROCEDURE — OTHER COUNSELING: TOPICAL STEROIDS: OTHER

## 2018-03-14 RX ORDER — CALCIPOTRIENE AND BETAMETHASONE DIPROPIONATE 50; .5 UG/G; MG/G
OINTMENT TOPICAL
Qty: 1 | Refills: 6 | Status: ERX | COMMUNITY
Start: 2018-03-14

## 2018-03-14 ASSESSMENT — LOCATION DETAILED DESCRIPTION DERM
LOCATION DETAILED: RIGHT ACHILLES SKIN
LOCATION DETAILED: LEFT INFERIOR CENTRAL MALAR CHEEK
LOCATION DETAILED: LEFT ACHILLES SKIN
LOCATION DETAILED: LEFT THENAR EMINENCE
LOCATION DETAILED: RIGHT INFERIOR CENTRAL MALAR CHEEK
LOCATION DETAILED: RIGHT SUPERIOR LATERAL MALAR CHEEK
LOCATION DETAILED: LEFT ANTITRAGUS
LOCATION DETAILED: LEFT SUPERIOR CENTRAL MALAR CHEEK
LOCATION DETAILED: LEFT MEDIAL DORSAL FOOT
LOCATION DETAILED: LEFT CENTRAL EYEBROW
LOCATION DETAILED: NASAL DORSUM

## 2018-03-14 ASSESSMENT — LOCATION ZONE DERM
LOCATION ZONE: LEG
LOCATION ZONE: NOSE
LOCATION ZONE: HAND
LOCATION ZONE: FACE
LOCATION ZONE: EAR
LOCATION ZONE: FEET

## 2018-03-14 ASSESSMENT — LOCATION SIMPLE DESCRIPTION DERM
LOCATION SIMPLE: LEFT CHEEK
LOCATION SIMPLE: LEFT EYEBROW
LOCATION SIMPLE: LEFT EAR
LOCATION SIMPLE: LEFT ACHILLES SKIN
LOCATION SIMPLE: RIGHT ACHILLES SKIN
LOCATION SIMPLE: RIGHT CHEEK
LOCATION SIMPLE: NOSE
LOCATION SIMPLE: LEFT FOOT
LOCATION SIMPLE: LEFT HAND

## 2018-03-14 NOTE — PROCEDURE: RECOMMENDATIONS
Recommendation Preamble: The following recommendations were made during the visit:
Recommendations (Free Text): Apply otc retinol nightly
Detail Level: Zone

## 2018-03-14 NOTE — PROCEDURE: LIQUID NITROGEN
Number Of Freeze-Thaw Cycles: 1 freeze-thaw cycle
Render Post-Care Instructions In Note?: yes
Duration Of Freeze Thaw-Cycle (Seconds): 4
Post-Care Instructions: I reviewed with the patient in detail post-care instructions. Patient is to wear sunprotection, and avoid picking at any of the treated lesions. Pt may apply Vaseline to crusted or scabbing areas.
Consent: The patient's consent was obtained including but not limited to risks of crusting, scabbing, blistering, scarring, darker or lighter pigmentary change, recurrence, incomplete removal and infection.
Detail Level: Detailed

## 2018-03-14 NOTE — PROCEDURE: MIPS QUALITY
Quality 111:Pneumonia Vaccination Status For Older Adults: Pneumococcal Vaccination Previously Received
Quality 154 Part B: Falls: Risk Screening (Should Be Reported With Measure 155.): Patient screened for future fall risk; documentation of no falls in the past year or only one fall without injury in the past year
Quality 130: Documentation Of Current Medications In The Medical Record: Current Medications Documented
Quality 128: Preventive Care And Screening: Body Mass Index (Bmi) Screening And Follow-Up Plan: BMI is documented within normal parameters and no follow-up plan is required.
Quality 134: Screening For Clinical Depression And Follow-Up Plan: The patient was screened for depression and the screen was negative and no follow up required
Quality 131: Pain Assessment And Follow-Up: Pain assessment using a standardized tool is documented as negative, no follow-up plan required
Quality 155: Falls Plan Of Care: Plan of Care not Documented, Reason not Otherwise Specified
Quality 47: Advance Care Plan: Advance Care Planning discussed and documented in the medical record; patient did not wish or was not able to name a surrogate decision maker or provide an advance care plan.
Quality 265: Biopsy Follow-Up: Biopsy results reviewed, communicated, tracked, and documented
Quality 50: Urinary Incontinence: Plan Of Care For Urinary Incontinence In Women Aged 65 Years And Older: Urinary incontinence plan of care not documented, reason not otherwise specified
Quality 431: Preventive Care And Screening: Unhealthy Alcohol Use - Screening: Patient screened for unhealthy alcohol use using a single question and scores less than 2 times per year
Quality 226: Preventive Care And Screening: Tobacco Use: Screening And Cessation Intervention: Patient screened for tobacco and is an ex-smoker
Quality 48: Urinary Incontinence: Assessment Of Presence Or Absence Of Urinary Incontinence In Women Aged 65 Years And Older: Presence or absence of urinary incontinence not assessed, reason not otherwise specified
Quality 317: Preventative Care And Screening: Screening For High Blood Pressure And Follow-Up Documented: Patient refuses to participate (either BP measurement or follow-up).
Detail Level: Detailed
Quality 154 Part A: Falls: Risk Assessment (Should Be Reported With Measure 155.): Falls risk assessment completed and documented in the past 12 months.
Quality 110: Preventive Care And Screening: Influenza Immunization: Influenza Immunization previously received during influenza season

## 2018-03-15 ENCOUNTER — CLINICAL SUPPORT (OUTPATIENT)
Dept: CARDIOLOGY CLINIC | Age: 73
End: 2018-03-15

## 2018-03-15 DIAGNOSIS — I48.91 ATRIAL FIBRILLATION, UNSPECIFIED TYPE (HCC): Primary | ICD-10-CM

## 2018-03-19 ENCOUNTER — RX ONLY (RX ONLY)
Age: 73
End: 2018-03-19

## 2018-03-19 RX ORDER — BETAMETHASONE VALERATE 1 MG/G
CREAM TOPICAL
Qty: 1 | Refills: 5 | Status: ERX | COMMUNITY
Start: 2018-03-19

## 2018-03-19 RX ORDER — CALCIPOTRIENE 50 UG/G
OINTMENT TOPICAL
Qty: 1 | Refills: 5 | Status: ERX | COMMUNITY
Start: 2018-03-19

## 2018-04-12 ENCOUNTER — OFFICE VISIT (OUTPATIENT)
Dept: CARDIOLOGY CLINIC | Age: 73
End: 2018-04-12

## 2018-04-12 VITALS
OXYGEN SATURATION: 95 % | DIASTOLIC BLOOD PRESSURE: 84 MMHG | BODY MASS INDEX: 27.35 KG/M2 | HEART RATE: 57 BPM | WEIGHT: 160.2 LBS | SYSTOLIC BLOOD PRESSURE: 130 MMHG | HEIGHT: 64 IN | RESPIRATION RATE: 16 BRPM

## 2018-04-12 DIAGNOSIS — Z86.79 S/P ABLATION OF ATRIAL FIBRILLATION: ICD-10-CM

## 2018-04-12 DIAGNOSIS — Z95.818 STATUS POST PLACEMENT OF IMPLANTABLE LOOP RECORDER: ICD-10-CM

## 2018-04-12 DIAGNOSIS — I10 ESSENTIAL HYPERTENSION: ICD-10-CM

## 2018-04-12 DIAGNOSIS — I48.91 ATRIAL FIBRILLATION, UNSPECIFIED TYPE (HCC): ICD-10-CM

## 2018-04-12 DIAGNOSIS — I49.9 IRREGULAR HEARTBEAT: Primary | ICD-10-CM

## 2018-04-12 DIAGNOSIS — Z98.890 S/P ABLATION OF ATRIAL FIBRILLATION: ICD-10-CM

## 2018-04-12 NOTE — PROGRESS NOTES
Subjective:      Suellen Paiz is a 71 y.o. female is here for follow up s/p AF ablation 9/2017. She is doing well, feels occasional palpitations which are brief. The patient denies chest pain/ shortness of breath, orthopnea, PND, LE edema, syncope, presyncope or fatigue. Patient Active Problem List    Diagnosis Date Noted    Irregular heartbeat 04/12/2018    S/P ablation of atrial fibrillation 09/16/2017    Status post placement of implantable loop recorder 09/16/2017    A-fib ([address] 09/15/2017    Heart rate fast 09/11/2017    Paroxysmal atrial fibrillation (HCC) 09/11/2017      Young Colon MD  No past medical history on file. Past Surgical History:   Procedure Laterality Date    BREAST SURGERY PROCEDURE UNLISTED      breadt biopsy left    COLONOSCOPY N/A 7/11/2017    COLONOSCOPY performed by Mariela Minor MD at [address] [facility]X ORTHOPAEDIC      bunionectomy right foot     No Known Allergies   Family History   Problem Relation Age of Onset    High Cholesterol Mother     Coronary Artery Disease Brother     High Cholesterol Brother     negative for cardiac disease  Social History     Social History    Marital status:      Spouse name: N/A    Number of children: N/A    Years of education: N/A     Social History Main Topics    Smoking status: Former Smoker     Packs/day: 0.50     Years: 6.00     Quit date: 9/11/1970    Smokeless tobacco: Never Used    Alcohol use Yes      Comment: occasional wine    Drug use: No    Sexual activity: Not Asked     Other Topics Concern    None     Social History Narrative     Current Outpatient Prescriptions   Medication Sig    metoprolol succinate (TOPROL-XL) 25 mg XL tablet Take 0.5 Tabs by mouth daily. Replaces metoprolol tartrate due to 24 hour effect, refills per Dr. Jay Harrell rivaroxaban (XARELTO) 20 mg tab tablet Take 1 Tab by mouth daily.     calcium-cholecalciferol, d3, (CALCIUM 600 + D) 600-125 mg-unit tab Take 1 Tab by mouth daily.  aspirin delayed-release 81 mg tablet Take  by mouth daily. No current facility-administered medications for this visit. Vitals:    04/12/18 0941   BP: 130/84   Pulse: (!) 57   Resp: 16   SpO2: 95%   Weight: 160 lb 3.2 oz (72.7 kg)   Height: 5' 4\" (1.626 m)       I have reviewed the nurses notes, vitals, problem list, allergy list, medical history, family, social history and medications. Review of Symptoms:    General: Pt denies excessive weight gain or loss. Pt is able to conduct ADL's  HEENT: Denies blurred vision, headaches, epistaxis and difficulty swallowing. Respiratory: Denies shortness of breath, RUANO, wheezing or stridor. Cardiovascular: +palpitations, Denies precordial pain, edema or PND  Gastrointestinal: Denies poor appetite, indigestion, abdominal pain blood in stool  Urinary: Denies dysuria, pyuria  Musculoskeletal: Denies pain or swelling from muscles or joints  Neurologic: Denies tremor, paresthesias, or sensory motor disturbance  Skin: Denies rash, itching or texture change. Psych: Denies depression      Physical Exam:      General: Well developed, in no acute distress. HEENT: Eyes - PERRL, no jvd  Heart:  Normal S1/S2 negative S3 or S4. Regular, no murmur, gallop or rub.   Respiratory: Clear bilaterally x 4, no wheezing or rales  Abdomen:   Soft, non-tender, bowel sounds are active.   Extremities:  No edema, normal cap refill, no cyanosis. Musculoskeletal: No clubbing  Neuro: A&Ox3, speech clear, gait stable. Skin: Skin color is normal. No rashes or lesions. Non diaphoretic  Vascular: 2+ pulses symmetric in all extremities    Cardiographics    Ekg: sinus rhythm   ILR, episodes of AF  (2 min)    No results found for this or any previous visit.       Lab Results   Component Value Date/Time    WBC 7.5 09/12/2017 11:56 AM    HGB 14.3 09/12/2017 11:56 AM    HCT 44.0 09/12/2017 11:56 AM    PLATELET 026 74/39/1253 11:56 AM    MCV 94 09/12/2017 11:56 AM      Lab Results Component Value Date/Time    Sodium 137 09/16/2017 03:18 AM    Potassium 4.2 09/16/2017 03:18 AM    Chloride 108 09/16/2017 03:18 AM    CO2 22 09/16/2017 03:18 AM    Anion gap 7 09/16/2017 03:18 AM    Glucose 90 09/16/2017 03:18 AM    BUN 12 09/16/2017 03:18 AM    Creatinine 0.58 09/16/2017 03:18 AM    BUN/Creatinine ratio 21 (H) 09/16/2017 03:18 AM    GFR est AA >60 09/16/2017 03:18 AM    GFR est non-AA >60 09/16/2017 03:18 AM    Calcium 7.1 (L) 09/16/2017 03:18 AM    Bilirubin, total 0.4 09/12/2017 11:56 AM    AST (SGOT) 23 09/12/2017 11:56 AM    Alk. phosphatase 80 09/12/2017 11:56 AM    Protein, total 6.6 09/12/2017 11:56 AM    Albumin 4.2 09/12/2017 11:56 AM    A-G Ratio 1.8 09/12/2017 11:56 AM    ALT (SGPT) 16 09/12/2017 11:56 AM         Assessment:     Assessment:        ICD-10-CM ICD-9-CM    1. Irregular heartbeat I49.9 427.9 AMB POC EKG ROUTINE W/ 12 LEADS, INTER & REP   2. Atrial fibrillation, unspecified type ([address] I48.91 427.31    3. S/P ablation of atrial fibrillation Z98.890 V45.89     Z86.79     4. Status post placement of implantable loop recorder Z95.818 V45.09    5. Essential hypertension I10 401.9      Orders Placed This Encounter    AMB POC EKG ROUTINE W/ 12 LEADS, INTER & REP     Order Specific Question:   Reason for Exam:     Answer:   routine        Plan:   Ms. Daylin Hou is here for follow up s/p AF ablation in 9/2017. She is doing well and feels improvement overall. EKG shows NSR. Amiodarone was discontinued last visit. She will continue BB and Xarelto and follow up with Dr. [place] in 6 months. Cont med rx for htn and AF. Continue medical management for AF and htn. Thank you for allowing me to participate in Pedritojess Soliz 's care.     MD Albino Arenas MD, Dandre Alejandre

## 2018-04-12 NOTE — MR AVS SNAPSHOT
[address] [facility] 
[phone] Patient: Shelby Balderrama MRN: [id number] ILT:[id number]/[id number] Visit Information Date & Time Provider Department Dept. Phone Encounter #  
 4/12/2018  9:45 AM Ayesha Clay Tip, [address] [facility] [phone] [id number] Your Appointments 4/19/2018  8:00 AM  
PROCEDURE with [facility] [address] [facility]) Appt Note: NOT A OFFICE VISIT  REMOTE MDT ILR; NOT A OFFICE VISIT REMOTE MDT ILR; NOT A OFFICE VISIT REMOTE MDT [address] [facility]  
[phone] [id number] [facility]  
  
    
 10/23/2018 10:00 AM  
PROCEDURE with PACEMAKER, [facility] [address]) Appt Note: 6 month f/u & device check [id number] [facility]  
[phone] [id number] [facility]  
  
    
 10/23/2018 10:15 AM  
ESTABLISHED PATIENT with Austin Mcneil MD  
[facility] [address] [facility]) Appt Note: 6 month f/u & device check [id number] [facility]  
[phone] [id number] [facility] Upcoming Health Maintenance Date Due Hepatitis C Screening 1945 DTaP/Tdap/Td series (1 - Tdap) 4/23/1967 BREAST CANCER SCRN MAMMOGRAM 4/23/1996 FOBT Q 1 YEAR AGE 50-75 4/23/1996 ZOSTER VACCINE AGE 60> 2/23/2006 GLAUCOMA SCREENING Q2Y 4/23/2011 Bone Densitometry (Dexa) Screening 4/23/2011 Pneumococcal 65+ Low/Medium Risk (1 of 2 - PCV13) 4/23/2011 Influenza Age 5 to Adult 8/1/2017 MEDICARE YEARLY EXAM 3/14/2018 Allergies as of 4/12/2018  Review Complete On: 4/12/2018 By: Hollie Harman NP No Known Allergies Current Immunizations  Never Reviewed No immunizations on file. Not reviewed this visit You Were Diagnosed With   
  
 Codes Comments Irregular heartbeat    -  Primary ICD-10-CM: I49.9 ICD-9-CM: 427.9 Atrial fibrillation, unspecified type ([facility]     ICD-10-CM: I48.91 
ICD-9-CM: 427.31 S/P ablation of atrial fibrillation     ICD-10-CM: Z98.890, Z86.79 
ICD-9-CM: V45.89 Status post placement of implantable loop recorder     ICD-10-CM: Z95.818 ICD-9-CM: V45.09 Vitals BP Pulse Resp Height(growth percentile) Weight(growth percentile) SpO2  
 130/84 (BP 1 Location: Left arm, BP Patient Position: Sitting) (!) 57 16 5' 4\" (1.626 m) 160 lb 3.2 oz (72.7 kg) 95% BMI OB Status Smoking Status 27.5 kg/m2 Postmenopausal Former Smoker Vitals History BMI and BSA Data Body Mass Index Body Surface Area  
 27.5 kg/m 2 1.81 m 2 Preferred Pharmacy Pharmacy Name Phone [id number], [address] Your Updated Medication List  
  
   
This list is accurate as of 4/12/18 10:57 AM.  Always use your most recent med list.  
  
  
  
  
 aspirin delayed-release 81 mg tablet Take  by mouth daily. CALCIUM 600 + D 600-125 mg-unit Tab Generic drug:  calcium-cholecalciferol (d3) Take 1 Tab by mouth daily. metoprolol succinate 25 mg XL tablet Commonly known as:  TOPROL-XL Take 0.5 Tabs by mouth daily. Replaces metoprolol tartrate due to 24 hour effect, refills per Dr. Felipe Plata  
  
 rivaroxaban 20 mg Tab tablet Commonly known as:  Lourena Parkin Take 1 Tab by mouth daily. We Performed the Following AMB POC EKG ROUTINE W/ 12 LEADS, INTER & REP [67749 CPT(R)] Introducing [facility]! Dear Adalgisa Roberts: Thank you for requesting a [employer] account. Our records indicate that you already have an active [employer] account. You can access your account anytime at [email or web address] Did you know that you can access your hospital and ER discharge instructions at any time in [employer]? You can also review all of your test results from your hospital stay or ER visit. Additional Information If you have questions, please visit the Frequently Asked Questions section of the [employer] website at [email or web address] Remember, [employer] is NOT to be used for urgent needs. For medical emergencies, dial 911. Now available from your iPhone and Android! Please provide this summary of care documentation to your next provider. Your primary care clinician is listed as Elisa Giles. If you have any questions after today's visit, please call [phone].

## 2018-04-12 NOTE — PROGRESS NOTES
1. Have you been to the ER, urgent care clinic since your last visit? Hospitalized since your last visit? No.    2. Have you seen or consulted any other health care providers outside of the [address] [place] [address] since your last visit? Include any pap smears or colon screening. Seen by PCP.       Chief Complaint   Patient presents with    Other     3 month f/u- pt denies any cardiac symptoms

## 2018-04-18 ENCOUNTER — CLINICAL SUPPORT (OUTPATIENT)
Dept: CARDIOLOGY CLINIC | Age: 73
End: 2018-04-18

## 2018-04-18 DIAGNOSIS — I48.91 ATRIAL FIBRILLATION, UNSPECIFIED TYPE (HCC): Primary | ICD-10-CM

## 2018-05-21 ENCOUNTER — OFFICE VISIT (OUTPATIENT)
Dept: CARDIOLOGY CLINIC | Age: 73
End: 2018-05-21

## 2018-05-21 ENCOUNTER — TELEPHONE (OUTPATIENT)
Dept: CARDIOLOGY CLINIC | Age: 73
End: 2018-05-21

## 2018-05-21 DIAGNOSIS — Z98.890 S/P ABLATION OF ATRIAL FIBRILLATION: ICD-10-CM

## 2018-05-21 DIAGNOSIS — Z86.79 S/P ABLATION OF ATRIAL FIBRILLATION: ICD-10-CM

## 2018-05-21 DIAGNOSIS — Z95.818 STATUS POST PLACEMENT OF IMPLANTABLE LOOP RECORDER: Primary | ICD-10-CM

## 2018-05-21 DIAGNOSIS — I48.91 ATRIAL FIBRILLATION, UNSPECIFIED TYPE (HCC): ICD-10-CM

## 2018-05-21 NOTE — TELEPHONE ENCOUNTER
No answer, left message stating remote home monitor summary report not received - asked for patient to send manual report - given number to [employer] for assistance. [phone].

## 2018-06-21 ENCOUNTER — APPOINTMENT (OUTPATIENT)
Age: 73
Setting detail: DERMATOLOGY
End: 2018-06-25

## 2018-06-21 DIAGNOSIS — L40.3 PUSTULOSIS PALMARIS ET PLANTARIS: ICD-10-CM

## 2018-06-21 DIAGNOSIS — Z71.89 OTHER SPECIFIED COUNSELING: ICD-10-CM

## 2018-06-21 PROBLEM — L30.9 DERMATITIS, UNSPECIFIED: Status: ACTIVE | Noted: 2018-06-21

## 2018-06-21 PROCEDURE — OTHER COUNSELING: OTHER

## 2018-06-21 PROCEDURE — OTHER MIPS QUALITY: OTHER

## 2018-06-21 PROCEDURE — 99213 OFFICE O/P EST LOW 20 MIN: CPT

## 2018-06-21 PROCEDURE — OTHER COUNSELING: TOPICAL STEROIDS: OTHER

## 2018-06-21 NOTE — PROCEDURE: MIPS QUALITY
Quality 431: Preventive Care And Screening: Unhealthy Alcohol Use - Screening: Patient screened for unhealthy alcohol use using a single question and scores less than 2 times per year
Quality 130: Documentation Of Current Medications In The Medical Record: Current Medications Documented
Quality 47: Advance Care Plan: Advance Care Planning discussed and documented in the medical record; patient did not wish or was not able to name a surrogate decision maker or provide an advance care plan.
Quality 131: Pain Assessment And Follow-Up: Pain assessment using a standardized tool is documented as negative, no follow-up plan required
Detail Level: Detailed
Quality 226: Preventive Care And Screening: Tobacco Use: Screening And Cessation Intervention: Patient screened for tobacco and is an ex-smoker
Quality 154 Part A: Falls: Risk Assessment (Should Be Reported With Measure 155.): Falls risk assessment completed and documented in the past 12 months.
Quality 50: Urinary Incontinence: Plan Of Care For Urinary Incontinence In Women Aged 65 Years And Older: Urinary incontinence plan of care not documented, reason not otherwise specified
Quality 317: Preventative Care And Screening: Screening For High Blood Pressure And Follow-Up Documented: Patient refuses to participate (either BP measurement or follow-up).
Quality 111:Pneumonia Vaccination Status For Older Adults: Pneumococcal Vaccination Previously Received
Quality 134: Screening For Clinical Depression And Follow-Up Plan: The patient was screened for depression and the screen was negative and no follow up required
Quality 265: Biopsy Follow-Up: Biopsy results reviewed, communicated, tracked, and documented
Quality 154 Part B: Falls: Risk Screening (Should Be Reported With Measure 155.): Patient screened for future fall risk; documentation of no falls in the past year or only one fall without injury in the past year
Quality 128: Preventive Care And Screening: Body Mass Index (Bmi) Screening And Follow-Up Plan: BMI is documented within normal parameters and no follow-up plan is required.
Quality 48: Urinary Incontinence: Assessment Of Presence Or Absence Of Urinary Incontinence In Women Aged 65 Years And Older: Presence or absence of urinary incontinence not assessed, reason not otherwise specified
Quality 155: Falls Plan Of Care: Plan of Care not Documented, Reason not Otherwise Specified
Quality 110: Preventive Care And Screening: Influenza Immunization: Influenza Immunization previously received during influenza season

## 2018-06-25 ENCOUNTER — CLINICAL SUPPORT (OUTPATIENT)
Dept: CARDIOLOGY CLINIC | Age: 73
End: 2018-06-25

## 2018-06-25 DIAGNOSIS — I48.0 PAROXYSMAL ATRIAL FIBRILLATION (HCC): Primary | ICD-10-CM

## 2018-06-25 DIAGNOSIS — Z86.79 S/P ABLATION OF ATRIAL FIBRILLATION: ICD-10-CM

## 2018-06-25 DIAGNOSIS — Z98.890 S/P ABLATION OF ATRIAL FIBRILLATION: ICD-10-CM

## 2018-06-25 DIAGNOSIS — Z95.818 STATUS POST PLACEMENT OF IMPLANTABLE LOOP RECORDER: ICD-10-CM

## 2018-08-27 ENCOUNTER — CLINICAL SUPPORT (OUTPATIENT)
Dept: CARDIOLOGY CLINIC | Age: 73
End: 2018-08-27

## 2018-08-27 DIAGNOSIS — Z45.09 ENCOUNTER FOR LOOP RECORDER CHECK: ICD-10-CM

## 2018-08-27 DIAGNOSIS — Z98.890 S/P ABLATION OF ATRIAL FIBRILLATION: ICD-10-CM

## 2018-08-27 DIAGNOSIS — Z86.79 S/P ABLATION OF ATRIAL FIBRILLATION: ICD-10-CM

## 2018-08-27 DIAGNOSIS — I48.0 PAROXYSMAL ATRIAL FIBRILLATION (HCC): Primary | ICD-10-CM

## 2018-10-23 ENCOUNTER — OFFICE VISIT (OUTPATIENT)
Dept: CARDIOLOGY CLINIC | Age: 73
End: 2018-10-23

## 2018-10-23 VITALS
OXYGEN SATURATION: 94 % | DIASTOLIC BLOOD PRESSURE: 70 MMHG | HEIGHT: 64 IN | RESPIRATION RATE: 18 BRPM | HEART RATE: 61 BPM | SYSTOLIC BLOOD PRESSURE: 112 MMHG | WEIGHT: 155.6 LBS | BODY MASS INDEX: 26.56 KG/M2

## 2018-10-23 DIAGNOSIS — I49.1 PAC (PREMATURE ATRIAL CONTRACTION): ICD-10-CM

## 2018-10-23 DIAGNOSIS — I48.91 ATRIAL FIBRILLATION, UNSPECIFIED TYPE (HCC): Primary | ICD-10-CM

## 2018-10-23 NOTE — PROGRESS NOTES
Subjective:      Swati Mcclure is a 68 y.o. female is here for EP consult. The patient denies chest pain/ shortness of breath, orthopnea, PND, LE edema, palpitations, syncope, presyncope or fatigue. Feels well overall. Patient Active Problem List    Diagnosis Date Noted    Irregular heartbeat 2018    S/P ablation of atrial fibrillation 2017    Status post placement of implantable loop recorder 2017    A-fib ([address] 09/15/2017    Heart rate fast 2017    Paroxysmal atrial fibrillation (HCC) 2017      Andrew Foote MD  History reviewed. No pertinent past medical history.    Past Surgical History:   Procedure Laterality Date    BREAST SURGERY PROCEDURE UNLISTED      breadt biopsy left    HX ORTHOPAEDIC      bunionectomy right foot     No Known Allergies   Family History   Problem Relation Age of Onset    High Cholesterol Mother     Coronary Artery Disease Brother     High Cholesterol Brother     negative for cardiac disease  Social History     Socioeconomic History    Marital status:      Spouse name: Not on file    Number of children: Not on file    Years of education: Not on file    Highest education level: Not on file   Social Needs    Financial resource strain: Not on file    Food insecurity - worry: Not on file    Food insecurity - inability: Not on file   [employer] needs - medical: Not on file   [employer] needs - non-medical: Not on file   Occupational History    Not on file   Tobacco Use    Smoking status: Former Smoker     Packs/day: 0.50     Years: 6.00     Pack years: 3.00     Last attempt to quit: 1970     Years since quittin.1    Smokeless tobacco: Never Used   Substance and Sexual Activity    Alcohol use: Yes     Comment: occasional wine    Drug use: No    Sexual activity: Not on file   Other Topics Concern    Not on file   Social History Narrative    Not on file     Current Outpatient Medications Medication Sig    metoprolol succinate (TOPROL-XL) 25 mg XL tablet Take 0.5 Tabs by mouth daily. Replaces metoprolol tartrate due to 24 hour effect, refills per Dr. Halley Eagle rivaroxaban (XARELTO) 20 mg tab tablet Take 1 Tab by mouth daily.  calcium-cholecalciferol, d3, (CALCIUM 600 + D) 600-125 mg-unit tab Take 1 Tab by mouth daily.  aspirin delayed-release 81 mg tablet Take  by mouth daily. No current facility-administered medications for this visit. Vitals:    10/23/18 1018   BP: 112/70   Pulse: 61   Resp: 18   SpO2: 94%   Weight: 155 lb 9.6 oz (70.6 kg)   Height: 5' 4\" (1.626 m)       I have reviewed the nurses notes, vitals, problem list, allergy list, medical history, family, social history and medications. Review of Symptoms:    General: Pt denies excessive weight gain or loss. Pt is able to conduct ADL's  HEENT: Denies blurred vision, headaches, epistaxis and difficulty swallowing. Respiratory: Denies shortness of breath, RUANO, wheezing or stridor. Cardiovascular: Denies precordial pain, palpitations, edema or PND  Gastrointestinal: Denies poor appetite, indigestion, abdominal pain or blood in stool  Urinary: Denies dysuria, pyuria  Musculoskeletal: Denies pain or swelling from muscles or joints  Neurologic: Denies tremor, paresthesias, or sensory motor disturbance  Skin: Denies rash, itching or texture change. Psych: Denies depression      Physical Exam:      General: Well developed, in no acute distress. HEENT: Eyes - PERRL, no jvd  Heart:  Normal S1/S2 negative S3 or S4. Regular, no murmur, gallop or rub.   Respiratory: Clear bilaterally x 4, no wheezing or rales  Extremities:  No edema, normal cap refill, no cyanosis. Musculoskeletal: No clubbing  Neuro: A&Ox3, speech clear, gait stable. Skin: Skin color is normal. No rashes or lesions.  Non diaphoretic  Vascular: 2+ pulses symmetric in all extremities    Cardiographics    Ekg: Sinus  Rhythm  - occasional PAC     # PACs = 1.  -  Negative precordial T-waves  -Probably normal -consider anteroseptal ischemia. No results found for this or any previous visit. Lab Results   Component Value Date/Time    WBC 7.5 09/12/2017 11:56 AM    HGB 14.3 09/12/2017 11:56 AM    HCT 44.0 09/12/2017 11:56 AM    PLATELET 288 03/05/9482 11:56 AM    MCV 94 09/12/2017 11:56 AM      Lab Results   Component Value Date/Time    Sodium 137 09/16/2017 03:18 AM    Potassium 4.2 09/16/2017 03:18 AM    Chloride 108 09/16/2017 03:18 AM    CO2 22 09/16/2017 03:18 AM    Anion gap 7 09/16/2017 03:18 AM    Glucose 90 09/16/2017 03:18 AM    BUN 12 09/16/2017 03:18 AM    Creatinine 0.58 09/16/2017 03:18 AM    BUN/Creatinine ratio 21 (H) 09/16/2017 03:18 AM    GFR est AA >60 09/16/2017 03:18 AM    GFR est non-AA >60 09/16/2017 03:18 AM    Calcium 7.1 (L) 09/16/2017 03:18 AM    Bilirubin, total 0.4 09/12/2017 11:56 AM    AST (SGOT) 23 09/12/2017 11:56 AM    Alk. phosphatase 80 09/12/2017 11:56 AM    Protein, total 6.6 09/12/2017 11:56 AM    Albumin 4.2 09/12/2017 11:56 AM    A-G Ratio 1.8 09/12/2017 11:56 AM    ALT (SGPT) 16 09/12/2017 11:56 AM      No results found for: TSH, TSH2, TSH3, TSHP, TSHEXT, TSHEXT     Assessment:          ICD-10-CM ICD-9-CM    1. Atrial fibrillation, unspecified type ([address] I48.91 427.31 AMB POC EKG ROUTINE W/ 12 LEADS, INTER & REP   2. PAC (premature atrial contraction) I49.1 427.61      Orders Placed This Encounter    AMB POC EKG ROUTINE W/ 12 LEADS, INTER & REP     Order Specific Question:   Reason for Exam:     Answer:   routine        Plan:     Ms. Lawrence Garnica is here for follow up s/p AF ablation in 9/2017. She is doing well and feels improvement overall. EKG shows NSR. ILR sinus with PACs. She will continue BB and Xarelto and follow up with us in 1 year.      Continue medical management for AF and htn. Thank you for allowing me to participate in Jas Devi 's care. Tino Granger NP    Patient seen and examined.  All pertinent data reviewed. I have reviewed detailed note as outlined by Sam Corral NP. Case discussed with Nursing/medical assistant staff and Sam Corral NP. Plans as outlined. ILR c/w with pacs. Cont low dose bb and oac. F/uj in one year.      Deangelo Cassidy MD, Preethi Lovelace

## 2018-11-26 ENCOUNTER — CLINICAL SUPPORT (OUTPATIENT)
Dept: CARDIOLOGY CLINIC | Age: 73
End: 2018-11-26

## 2018-11-26 DIAGNOSIS — Z45.09 ENCOUNTER FOR LOOP RECORDER CHECK: ICD-10-CM

## 2018-11-26 DIAGNOSIS — I48.0 PAROXYSMAL ATRIAL FIBRILLATION (HCC): Primary | ICD-10-CM

## 2018-12-28 ENCOUNTER — CLINICAL SUPPORT (OUTPATIENT)
Dept: CARDIOLOGY CLINIC | Age: 73
End: 2018-12-28

## 2018-12-28 DIAGNOSIS — Z95.818 STATUS POST PLACEMENT OF IMPLANTABLE LOOP RECORDER: ICD-10-CM

## 2018-12-28 DIAGNOSIS — I48.0 PAROXYSMAL ATRIAL FIBRILLATION (HCC): Primary | ICD-10-CM

## 2019-01-02 ENCOUNTER — TELEPHONE (OUTPATIENT)
Dept: CARDIOLOGY CLINIC | Age: 74
End: 2019-01-02

## 2019-01-02 NOTE — TELEPHONE ENCOUNTER
Patient is out of town and needs a script for Xarelto 20mg [address] faxed to [phone]. She takes her last today. Thanks!

## 2019-01-02 NOTE — TELEPHONE ENCOUNTER
Faxed prescription for Zarelto 20mg, #90, one pill daily x3 refills to [phone] per Mac Garcia, SANTIAGO Kapadia, RN

## 2019-03-29 ENCOUNTER — OFFICE VISIT (OUTPATIENT)
Dept: CARDIOLOGY CLINIC | Age: 74
End: 2019-03-29

## 2019-03-29 DIAGNOSIS — Z45.09 ENCOUNTER FOR LOOP RECORDER CHECK: ICD-10-CM

## 2019-03-29 DIAGNOSIS — I48.0 PAROXYSMAL ATRIAL FIBRILLATION (HCC): Primary | ICD-10-CM

## 2019-06-28 ENCOUNTER — OFFICE VISIT (OUTPATIENT)
Dept: CARDIOLOGY CLINIC | Age: 74
End: 2019-06-28

## 2019-06-28 DIAGNOSIS — Z45.09 ENCOUNTER FOR LOOP RECORDER CHECK: ICD-10-CM

## 2019-06-28 DIAGNOSIS — I48.0 PAROXYSMAL ATRIAL FIBRILLATION (HCC): Primary | ICD-10-CM

## 2019-08-19 ENCOUNTER — OFFICE VISIT (OUTPATIENT)
Dept: CARDIOLOGY CLINIC | Age: 74
End: 2019-08-19

## 2019-08-19 DIAGNOSIS — Z45.09 ENCOUNTER FOR LOOP RECORDER CHECK: ICD-10-CM

## 2019-08-19 DIAGNOSIS — I48.0 PAROXYSMAL ATRIAL FIBRILLATION (HCC): Primary | ICD-10-CM

## 2019-09-10 ENCOUNTER — OFFICE VISIT (OUTPATIENT)
Dept: CARDIOLOGY CLINIC | Age: 74
End: 2019-09-10

## 2019-09-10 VITALS
DIASTOLIC BLOOD PRESSURE: 82 MMHG | SYSTOLIC BLOOD PRESSURE: 124 MMHG | WEIGHT: 155.4 LBS | BODY MASS INDEX: 26.53 KG/M2 | HEIGHT: 64 IN | OXYGEN SATURATION: 98 % | RESPIRATION RATE: 16 BRPM | HEART RATE: 74 BPM

## 2019-09-10 DIAGNOSIS — I10 ESSENTIAL HYPERTENSION: ICD-10-CM

## 2019-09-10 DIAGNOSIS — I49.1 PAC (PREMATURE ATRIAL CONTRACTION): ICD-10-CM

## 2019-09-10 DIAGNOSIS — Z95.818 STATUS POST PLACEMENT OF IMPLANTABLE LOOP RECORDER: ICD-10-CM

## 2019-09-10 DIAGNOSIS — Z98.890 S/P ABLATION OF ATRIAL FIBRILLATION: ICD-10-CM

## 2019-09-10 DIAGNOSIS — I48.91 ATRIAL FIBRILLATION, UNSPECIFIED TYPE (HCC): Primary | ICD-10-CM

## 2019-09-10 DIAGNOSIS — Z86.79 S/P ABLATION OF ATRIAL FIBRILLATION: ICD-10-CM

## 2019-09-10 DIAGNOSIS — R00.2 PALPITATION: ICD-10-CM

## 2019-09-10 NOTE — LETTER
9/10/19 Patient: Jose Elias Cr YOB: 1945 Date of Visit: 9/10/2019 Stephon Cheung MD 
[place] [address] VIA Facsimile: [phone] Dear Stephon Cheung MD, Thank you for referring Ms. Alix Presley to [facility] for evaluation. My notes for this consultation are attached. If you have questions, please do not hesitate to call me. I look forward to following your patient along with you. Sincerely, [place], MD

## 2019-09-10 NOTE — PROGRESS NOTES
Subjective:      Monserrat Ricci is a 69 y.o. female is here for follow up of her AF. She feels occasional palpitations, infrequent and brief. The patient denies chest pain/ shortness of breath, orthopnea, PND, LE edema, palpitations, syncope, presyncope or fatigue. Patient Active Problem List    Diagnosis Date Noted    Irregular heartbeat 2018    S/P ablation of atrial fibrillation 2017    Status post placement of implantable loop recorder 2017    A-fib ([address] 09/15/2017    Heart rate fast 2017    Paroxysmal atrial fibrillation (HCC) 2017      Cassondra Gowers, MD  History reviewed. No pertinent past medical history. Past Surgical History:   Procedure Laterality Date    BREAST SURGERY PROCEDURE UNLISTED      breadt biopsy left    COLONOSCOPY N/A 2017    COLONOSCOPY performed by Breanne Hanson MD at [address] [place] [address] ORTHOPAEDIC      bunionectomy right foot     No Known Allergies   Family History   Problem Relation Age of Onset    High Cholesterol Mother     Coronary Artery Disease Brother     High Cholesterol Brother     negative for cardiac disease  Social History     Socioeconomic History    Marital status:      Spouse name: Not on file    Number of children: Not on file    Years of education: Not on file    Highest education level: Not on file   Tobacco Use    Smoking status: Former Smoker     Packs/day: 0.50     Years: 6.00     Pack years: 3.00     Last attempt to quit: 1970     Years since quittin.0    Smokeless tobacco: Never Used   Substance and Sexual Activity    Alcohol use: Yes     Comment: occasional wine    Drug use: No     Current Outpatient Medications   Medication Sig    metoprolol succinate (TOPROL-XL) 25 mg XL tablet TAKE 1/2 TABLET BY MOUTH EVERY DAY (REPLACES METOPROLOL TARTRATE PER MD)    rivaroxaban (XARELTO) 20 mg tab tablet Take 1 Tab by mouth daily.     calcium-cholecalciferol, d3, (CALCIUM 600 + D) 600-125 mg-unit tab Take 1 Tab by mouth daily. No current facility-administered medications for this visit. Vitals:    09/10/19 1356   BP: 124/82   Pulse: 74   Resp: 16   SpO2: 98%   Weight: 155 lb 6.4 oz (70.5 kg)   Height: 5' 4\" (1.626 m)       I have reviewed the nurses notes, vitals, problem list, allergy list, medical history, family, social history and medications. Review of Symptoms:    General: Pt denies excessive weight gain or loss. Pt is able to conduct ADL's  HEENT: Denies blurred vision, headaches, epistaxis and difficulty swallowing. Respiratory: Denies shortness of breath, RUANO, wheezing or stridor. Cardiovascular: Denies precordial pain, palpitations, edema or PND  Gastrointestinal: Denies poor appetite, indigestion, abdominal pain or blood in stool  Urinary: Denies dysuria, pyuria  Musculoskeletal: Denies pain or swelling from muscles or joints  Neurologic: Denies tremor, paresthesias, or sensory motor disturbance  Skin: Denies rash, itching or texture change. Psych: Denies depression      Physical Exam:      General: Well developed, in no acute distress. HEENT: Eyes - PERRL, no jvd  Heart:  Normal S1/S2 negative S3 or S4. Regular, no murmur, gallop or rub. Respiratory: Clear bilaterally x 4, no wheezing or rales  Abdomen:   Soft, non-tender, bowel sounds are active. Extremities:  No edema, normal cap refill, no cyanosis. Musculoskeletal: No clubbing  Neuro: A&Ox3, speech clear, gait stable. Skin: Skin color is normal. No rashes or lesions. Non diaphoretic  Vascular: 2+ pulses symmetric in all extremities    Cardiographics    Ekg: nsr, pac    ilr - PAF with rvr    No results found for this or any previous visit.       Lab Results   Component Value Date/Time    WBC 7.5 09/12/2017 11:56 AM    HGB 14.3 09/12/2017 11:56 AM    HCT 44.0 09/12/2017 11:56 AM    PLATELET 631 76/48/2059 11:56 AM    MCV 94 09/12/2017 11:56 AM      Lab Results   Component Value Date/Time    Sodium 137 09/16/2017 03:18 AM    Potassium 4.2 09/16/2017 03:18 AM    Chloride 108 09/16/2017 03:18 AM    CO2 22 09/16/2017 03:18 AM    Anion gap 7 09/16/2017 03:18 AM    Glucose 90 09/16/2017 03:18 AM    BUN 12 09/16/2017 03:18 AM    Creatinine 0.58 09/16/2017 03:18 AM    BUN/Creatinine ratio 21 (H) 09/16/2017 03:18 AM    GFR est AA >60 09/16/2017 03:18 AM    GFR est non-AA >60 09/16/2017 03:18 AM    Calcium 7.1 (L) 09/16/2017 03:18 AM    Bilirubin, total 0.4 09/12/2017 11:56 AM    AST (SGOT) 23 09/12/2017 11:56 AM    Alk. phosphatase 80 09/12/2017 11:56 AM    Protein, total 6.6 09/12/2017 11:56 AM    Albumin 4.2 09/12/2017 11:56 AM    A-G Ratio 1.8 09/12/2017 11:56 AM    ALT (SGPT) 16 09/12/2017 11:56 AM         Assessment:     Assessment:        ICD-10-CM ICD-9-CM    1. Atrial fibrillation, unspecified type (HCC) I48.91 427.31 AMB POC EKG ROUTINE W/ 12 LEADS, INTER & REP   2. Status post placement of implantable loop recorder Z95.818 V45.09    3. Essential hypertension I10 401.9    4. Palpitation R00.2 785.1    5. S/P ablation of atrial fibrillation Z98.890 V45.89     Z86.79     6. PAC (premature atrial contraction) I49.1 427.61      Orders Placed This Encounter    AMB POC EKG ROUTINE W/ 12 LEADS, INTER & REP     Order Specific Question:   Reason for Exam:     Answer:   routine        Plan:   Ms. Sara Massey is here for follow up s/p AF ablation in 9/2017. She is doing well, and has occasional palpitations. EKG shows NSR. ILR sinus with PAC and brief episodes of AF with RVR. She will increase BB therapy and Xarelto and follow up with us in 1 year. Continue medical management for HTN, AF. Thank you for allowing me to participate in Nichelle Jiménez 's care. Raquel Loaiza NP    Patient seen and examined by me with nurse practitioner. I personally performed all components of the history, physical, and medical decision making and agree with the assessment and plan with minor modifications as noted.  Ms Sara Bryson had some PAF with rvr. She is on oac. We will increase her toprol dose. F/u in 3 months.     Gladys Stoner MD, Terese Ricks

## 2019-09-10 NOTE — PROGRESS NOTES
Chief Complaint   Patient presents with    Irregular Heart Beat     pt was informed need to be seen sooner than scheduled appointment     1. Have you been to the ER, urgent care clinic since your last visit? Hospitalized since your last visit? No    2. Have you seen or consulted any other health care providers outside of the [address] [place] [address] since your last visit? Include any pap smears or colon screening.  NO

## 2019-09-13 ENCOUNTER — TELEPHONE (OUTPATIENT)
Dept: CARDIOLOGY CLINIC | Age: 74
End: 2019-09-13

## 2019-09-13 RX ORDER — METOPROLOL SUCCINATE 25 MG/1
25 TABLET, EXTENDED RELEASE ORAL DAILY
Qty: 90 TAB | Refills: 3 | Status: SHIPPED | OUTPATIENT
Start: 2019-09-13 | End: 2020-01-15 | Stop reason: SDUPTHER

## 2019-09-13 NOTE — TELEPHONE ENCOUNTER
Received fax from [id number] requesting new prescription for recent increased dose of metoprolol. Per patient she is to take 25mg daily. Note from 9/10/19 says patient was to increase dose but no dose noted.

## 2019-09-27 ENCOUNTER — TELEPHONE (OUTPATIENT)
Dept: CARDIOLOGY CLINIC | Age: 74
End: 2019-09-27

## 2019-09-27 ENCOUNTER — OFFICE VISIT (OUTPATIENT)
Dept: CARDIOLOGY CLINIC | Age: 74
End: 2019-09-27

## 2019-09-27 DIAGNOSIS — I48.91 ATRIAL FIBRILLATION, UNSPECIFIED TYPE (HCC): Primary | ICD-10-CM

## 2019-09-27 DIAGNOSIS — Z45.09 ENCOUNTER FOR LOOP RECORDER CHECK: ICD-10-CM

## 2019-09-27 NOTE — TELEPHONE ENCOUNTER
Pt calling about her transmission and wanted to let you know that her monitor was unplugged.      Thanks

## 2019-09-27 NOTE — TELEPHONE ENCOUNTER
Spoke with pt. Verified patient with two identifiers. Informed her I received her transmission. She will make sure her monitor stays plugged in.

## 2019-12-10 ENCOUNTER — OFFICE VISIT (OUTPATIENT)
Dept: CARDIOLOGY CLINIC | Age: 74
End: 2019-12-10

## 2019-12-10 VITALS
OXYGEN SATURATION: 99 % | DIASTOLIC BLOOD PRESSURE: 70 MMHG | SYSTOLIC BLOOD PRESSURE: 112 MMHG | RESPIRATION RATE: 18 BRPM | BODY MASS INDEX: 26.63 KG/M2 | HEART RATE: 56 BPM | HEIGHT: 64 IN | WEIGHT: 156 LBS

## 2019-12-10 DIAGNOSIS — Z86.79 S/P ABLATION OF ATRIAL FIBRILLATION: ICD-10-CM

## 2019-12-10 DIAGNOSIS — I48.0 PAROXYSMAL ATRIAL FIBRILLATION (HCC): ICD-10-CM

## 2019-12-10 DIAGNOSIS — I10 ESSENTIAL HYPERTENSION: ICD-10-CM

## 2019-12-10 DIAGNOSIS — I48.91 ATRIAL FIBRILLATION, UNSPECIFIED TYPE (HCC): Primary | ICD-10-CM

## 2019-12-10 DIAGNOSIS — Z98.890 S/P ABLATION OF ATRIAL FIBRILLATION: ICD-10-CM

## 2019-12-10 DIAGNOSIS — R00.2 PALPITATION: ICD-10-CM

## 2019-12-10 NOTE — LETTER
12/10/19 Patient: Lalita Angel YOB: 1945 Date of Visit: 12/10/2019 Ashley Bartlett MD 
[facility] [place] [address] VIA Facsimile: [phone] Dear Ashley Bartlett MD, Thank you for referring Ms. Osiel Westfall to [facility] for evaluation. My notes for this consultation are attached. If you have questions, please do not hesitate to call me. I look forward to following your patient along with you. Sincerely, Nickolas Al MD

## 2019-12-10 NOTE — PROGRESS NOTES
Verified patient with 2 identifiers   Reviewed record in preparation for visit and obtained necessary documentation. Chief Complaint   Patient presents with    Irregular Heart Beat     3 month follow up - toprol increased at last OV - does not notice palpitations    Shortness of Breath     temporary - upon exertion      1. Have you been to the ER, urgent care clinic since your last visit? Hospitalized since your last visit? No     2. Have you seen or consulted any other health care providers outside of the [address] [place] [address] since your last visit? Include any pap smears or colon screening.   Yes Dr Keiry Back MD

## 2019-12-27 ENCOUNTER — OFFICE VISIT (OUTPATIENT)
Dept: CARDIOLOGY CLINIC | Age: 74
End: 2019-12-27

## 2019-12-27 DIAGNOSIS — Z45.09 ENCOUNTER FOR LOOP RECORDER CHECK: ICD-10-CM

## 2019-12-27 DIAGNOSIS — Z98.890 S/P ABLATION OF ATRIAL FIBRILLATION: Primary | ICD-10-CM

## 2019-12-27 DIAGNOSIS — Z86.79 S/P ABLATION OF ATRIAL FIBRILLATION: Primary | ICD-10-CM

## 2020-01-15 NOTE — TELEPHONE ENCOUNTER
[address] faxed over script refill request for metoprolol. Last filled on 9/13/19 for # 90 with 3 refills, however at [facility]. Now wants to go through [facility].

## 2020-01-16 RX ORDER — METOPROLOL SUCCINATE 25 MG/1
25 TABLET, EXTENDED RELEASE ORAL DAILY
Qty: 90 TAB | Refills: 2 | Status: SHIPPED | OUTPATIENT
Start: 2020-01-16 | End: 2020-12-03 | Stop reason: SDUPTHER

## 2020-03-20 ENCOUNTER — OFFICE VISIT (OUTPATIENT)
Dept: CARDIOLOGY CLINIC | Age: 75
End: 2020-03-20

## 2020-03-20 DIAGNOSIS — Z98.890 S/P ABLATION OF ATRIAL FIBRILLATION: Primary | ICD-10-CM

## 2020-03-20 DIAGNOSIS — Z86.79 S/P ABLATION OF ATRIAL FIBRILLATION: Primary | ICD-10-CM

## 2020-03-20 DIAGNOSIS — Z45.09 ENCOUNTER FOR LOOP RECORDER CHECK: ICD-10-CM

## 2020-04-07 ENCOUNTER — RX ONLY (RX ONLY)
Age: 75
End: 2020-04-07

## 2020-04-07 ENCOUNTER — APPOINTMENT (OUTPATIENT)
Age: 75
Setting detail: DERMATOLOGY
End: 2020-04-08

## 2020-04-07 DIAGNOSIS — L40.3 PUSTULOSIS PALMARIS ET PLANTARIS: ICD-10-CM

## 2020-04-07 PROCEDURE — OTHER MIPS QUALITY: OTHER

## 2020-04-07 PROCEDURE — 99213 OFFICE O/P EST LOW 20 MIN: CPT | Mod: 95

## 2020-04-07 PROCEDURE — OTHER PRESCRIPTION: OTHER

## 2020-04-07 PROCEDURE — OTHER COUNSELING: TOPICAL STEROIDS: OTHER

## 2020-04-07 PROCEDURE — OTHER RECOMMENDATIONS: OTHER

## 2020-04-07 PROCEDURE — OTHER COUNSELING: OTHER

## 2020-04-07 RX ORDER — UREA 40 %
CREAM (GRAM) TOPICAL
Qty: 1 | Refills: 6 | Status: ERX | COMMUNITY
Start: 2020-04-07

## 2020-04-07 RX ORDER — BETAMETHASONE VALERATE 1 MG/G
CREAM TOPICAL
Qty: 1 | Refills: 3 | Status: ERX

## 2020-04-07 ASSESSMENT — LOCATION DETAILED DESCRIPTION DERM
LOCATION DETAILED: LEFT ACHILLES SKIN
LOCATION DETAILED: RIGHT ACHILLES SKIN
LOCATION DETAILED: LEFT ULNAR PALM
LOCATION DETAILED: RIGHT ULNAR PALM

## 2020-04-07 ASSESSMENT — LOCATION SIMPLE DESCRIPTION DERM
LOCATION SIMPLE: LEFT ACHILLES SKIN
LOCATION SIMPLE: RIGHT ACHILLES SKIN
LOCATION SIMPLE: LEFT HAND
LOCATION SIMPLE: RIGHT HAND

## 2020-04-07 ASSESSMENT — LOCATION ZONE DERM
LOCATION ZONE: HAND
LOCATION ZONE: LEG

## 2020-04-07 NOTE — PROCEDURE: RECOMMENDATIONS
Recommendation Preamble: The following recommendations were made during the visit:
Detail Level: Zone
Recommendations (Free Text): Continue betamethasone cream as needed. Pt using very sparingly. Reports her hands only flare between May and October. Overall happy with condition but will add urea to help with desquamation and help mitigate flares.\\nThis appt completed via telemedicine today

## 2020-04-07 NOTE — PROCEDURE: MIPS QUALITY
Quality 431: Preventive Care And Screening: Unhealthy Alcohol Use - Screening: Patient screened for unhealthy alcohol use using a single question and scores less than 2 times per year
Quality 50: Urinary Incontinence: Plan Of Care For Urinary Incontinence In Women Aged 65 Years And Older: Urinary incontinence plan of care not documented, reason not otherwise specified
Quality 134: Screening For Clinical Depression And Follow-Up Plan: The patient was screened for depression and the screen was negative and no follow up required
Quality 474: Zoster Vaccination Status: Shingrix Vaccination Administered or Previously Received
Quality 265: Biopsy Follow-Up: Biopsy results reviewed, communicated, tracked, and documented
Quality 128: Preventive Care And Screening: Body Mass Index (Bmi) Screening And Follow-Up Plan: BMI is documented within normal parameters and no follow-up plan is required.
Quality 110: Preventive Care And Screening: Influenza Immunization: Influenza Immunization previously received during influenza season
Quality 154 Part A: Falls: Risk Assessment (Should Be Reported With Measure 155.): Falls risk assessment completed and documented in the past 12 months.
Quality 317: Preventative Care And Screening: Screening For High Blood Pressure And Follow-Up Documented: Patient refuses to participate (either BP measurement or follow-up).
Detail Level: Detailed
Quality 48: Urinary Incontinence: Assessment Of Presence Or Absence Of Urinary Incontinence In Women Aged 65 Years And Older: Presence or absence of urinary incontinence not assessed, reason not otherwise specified
Quality 155: Falls Plan Of Care: Plan of Care not Documented, Reason not Otherwise Specified
Quality 131: Pain Assessment And Follow-Up: Pain assessment using a standardized tool is documented as negative, no follow-up plan required
Quality 226: Preventive Care And Screening: Tobacco Use: Screening And Cessation Intervention: Patient screened for tobacco use and is an ex/non-smoker
Quality 154 Part B: Falls: Risk Screening (Should Be Reported With Measure 155.): Patient screened for future fall risk; documentation of no falls in the past year or only one fall without injury in the past year
Quality 111:Pneumonia Vaccination Status For Older Adults: Pneumococcal Vaccination Previously Received
Quality 47: Advance Care Plan: Advance Care Planning discussed and documented in the medical record; patient did not wish or was not able to name a surrogate decision maker or provide an advance care plan.
Quality 130: Documentation Of Current Medications In The Medical Record: Current Medications Documented

## 2020-06-19 ENCOUNTER — OFFICE VISIT (OUTPATIENT)
Dept: CARDIOLOGY CLINIC | Age: 75
End: 2020-06-19

## 2020-06-19 DIAGNOSIS — Z86.79 S/P ABLATION OF ATRIAL FIBRILLATION: Primary | ICD-10-CM

## 2020-06-19 DIAGNOSIS — Z98.890 S/P ABLATION OF ATRIAL FIBRILLATION: Primary | ICD-10-CM

## 2020-06-19 DIAGNOSIS — Z45.09 ENCOUNTER FOR LOOP RECORDER CHECK: ICD-10-CM

## 2020-09-17 ENCOUNTER — OFFICE VISIT (OUTPATIENT)
Dept: CARDIOLOGY CLINIC | Age: 75
End: 2020-09-17
Payer: MEDICARE

## 2020-09-17 DIAGNOSIS — I48.91 ATRIAL FIBRILLATION, UNSPECIFIED TYPE (HCC): Primary | ICD-10-CM

## 2020-09-17 DIAGNOSIS — Z45.09 ENCOUNTER FOR LOOP RECORDER CHECK: ICD-10-CM

## 2020-09-17 PROCEDURE — 93298 REM INTERROG DEV EVAL SCRMS: CPT | Performed by: INTERNAL MEDICINE

## 2020-10-09 ENCOUNTER — OFFICE VISIT (OUTPATIENT)
Dept: CARDIOLOGY CLINIC | Age: 75
End: 2020-10-09

## 2020-10-09 DIAGNOSIS — I48.91 ATRIAL FIBRILLATION, UNSPECIFIED TYPE (HCC): Primary | ICD-10-CM

## 2020-10-09 DIAGNOSIS — Z45.09 ENCOUNTER FOR LOOP RECORDER CHECK: ICD-10-CM

## 2020-10-27 ENCOUNTER — TELEPHONE (OUTPATIENT)
Dept: CARDIOLOGY CLINIC | Age: 75
End: 2020-10-27

## 2020-10-27 NOTE — TELEPHONE ENCOUNTER
Coverage determination regarding xarelto, filled out and signed by Alex Cordova NP, faxed to [employer] rx.

## 2020-10-28 ENCOUNTER — TELEPHONE (OUTPATIENT)
Dept: CARDIOLOGY CLINIC | Age: 75
End: 2020-10-28

## 2020-10-28 NOTE — TELEPHONE ENCOUNTER
Spoke with at [employer] regarding xarelto coverage.  xarelto was denied for a lower tier coverage because it is at the lowest possible tier cost. Left generic message on patients VM to call the office at [phone]

## 2020-12-17 ENCOUNTER — OFFICE VISIT (OUTPATIENT)
Dept: CARDIOLOGY CLINIC | Age: 75
End: 2020-12-17
Payer: MEDICARE

## 2020-12-17 VITALS
WEIGHT: 156 LBS | DIASTOLIC BLOOD PRESSURE: 70 MMHG | BODY MASS INDEX: 26.63 KG/M2 | HEART RATE: 61 BPM | OXYGEN SATURATION: 99 % | SYSTOLIC BLOOD PRESSURE: 119 MMHG | RESPIRATION RATE: 18 BRPM | HEIGHT: 64 IN

## 2020-12-17 DIAGNOSIS — Z98.890 S/P ABLATION OF ATRIAL FIBRILLATION: ICD-10-CM

## 2020-12-17 DIAGNOSIS — Z45.09 ENCOUNTER FOR LOOP RECORDER CHECK: ICD-10-CM

## 2020-12-17 DIAGNOSIS — I10 ESSENTIAL HYPERTENSION: ICD-10-CM

## 2020-12-17 DIAGNOSIS — I48.91 ATRIAL FIBRILLATION, UNSPECIFIED TYPE (HCC): Primary | ICD-10-CM

## 2020-12-17 DIAGNOSIS — Z86.79 S/P ABLATION OF ATRIAL FIBRILLATION: ICD-10-CM

## 2020-12-17 DIAGNOSIS — I48.0 PAROXYSMAL ATRIAL FIBRILLATION (HCC): ICD-10-CM

## 2020-12-17 PROCEDURE — 3017F COLORECTAL CA SCREEN DOC REV: CPT | Performed by: NURSE PRACTITIONER

## 2020-12-17 PROCEDURE — 93296 REM INTERROG EVL PM/IDS: CPT | Performed by: INTERNAL MEDICINE

## 2020-12-17 PROCEDURE — G8432 DEP SCR NOT DOC, RNG: HCPCS | Performed by: NURSE PRACTITIONER

## 2020-12-17 PROCEDURE — G8536 NO DOC ELDER MAL SCRN: HCPCS | Performed by: NURSE PRACTITIONER

## 2020-12-17 PROCEDURE — 1090F PRES/ABSN URINE INCON ASSESS: CPT | Performed by: NURSE PRACTITIONER

## 2020-12-17 PROCEDURE — G8419 CALC BMI OUT NRM PARAM NOF/U: HCPCS | Performed by: NURSE PRACTITIONER

## 2020-12-17 PROCEDURE — 93294 REM INTERROG EVL PM/LDLS PM: CPT | Performed by: INTERNAL MEDICINE

## 2020-12-17 PROCEDURE — 93005 ELECTROCARDIOGRAM TRACING: CPT | Performed by: NURSE PRACTITIONER

## 2020-12-17 PROCEDURE — 99214 OFFICE O/P EST MOD 30 MIN: CPT | Performed by: NURSE PRACTITIONER

## 2020-12-17 PROCEDURE — 93010 ELECTROCARDIOGRAM REPORT: CPT | Performed by: NURSE PRACTITIONER

## 2020-12-17 PROCEDURE — G8427 DOCREV CUR MEDS BY ELIG CLIN: HCPCS | Performed by: NURSE PRACTITIONER

## 2020-12-17 PROCEDURE — 1101F PT FALLS ASSESS-DOCD LE1/YR: CPT | Performed by: NURSE PRACTITIONER

## 2020-12-17 PROCEDURE — G0463 HOSPITAL OUTPT CLINIC VISIT: HCPCS | Performed by: NURSE PRACTITIONER

## 2020-12-17 PROCEDURE — G8400 PT W/DXA NO RESULTS DOC: HCPCS | Performed by: NURSE PRACTITIONER

## 2020-12-17 NOTE — PROGRESS NOTES
1. Have you been to the ER, urgent care clinic since your last visit? No  Hospitalized since your last visit?no     2. Have you seen or consulted any other health care providers outside of the [address] [place] [address] since your last visit? No  Include any pap smears or colon screening. no      Chief Complaint   Patient presents with    Follow-up     Denies ant cardiac symptoms

## 2020-12-17 NOTE — PROGRESS NOTES
ELECTROPHYSIOLOGY        Subjective:      Elinor Lofton is a 77 y.o. female is here for EP follow up. The patient denies chest pain/ shortness of breath, orthopnea, PND, LE edema, palpitations, syncope, presyncope or fatigue. Elinor Lofton  is on [facility], reports no melena, hematuria, or obvious signs of bleeding. No falls. Continues to walk regularly for her venous insufficiency. Is aware of discoloration around her veins on her legs; she attributes that to her Xarelto. Patient Active Problem List    Diagnosis Date Noted    Irregular heartbeat 2018    S/P ablation of atrial fibrillation 2017    Status post placement of implantable loop recorder 2017    A-fib ([address] 09/15/2017    Heart rate fast 2017    Paroxysmal atrial fibrillation (HCC) 2017      Bala Romano MD  History reviewed. No pertinent past medical history.    Past Surgical History:   Procedure Laterality Date    BREAST SURGERY PROCEDURE UNLISTED      breadt biopsy left    COLONOSCOPY N/A 2017    COLONOSCOPY performed by Ruthie Edmond MD at [address] HX ORTHOPAEDIC      bunionectomy right foot     No Known Allergies   Family History   Problem Relation Age of Onset    High Cholesterol Mother     Other Sister 67        Porphyrea    Coronary Artery Disease Brother     High Cholesterol Brother     negative for cardiac disease  Social History     Socioeconomic History    Marital status:      Spouse name: Not on file    Number of children: Not on file    Years of education: Not on file    Highest education level: Not on file   Tobacco Use    Smoking status: Former Smoker     Packs/day: 0.50     Years: 6.00     Pack years: 3.00     Quit date: 1970     Years since quittin.3    Smokeless tobacco: Never Used   Substance and Sexual Activity    Alcohol use: Yes     Comment: occasional wine    Drug use: No     Current Outpatient Medications   Medication Sig    metoprolol succinate (TOPROL-XL) 25 mg XL tablet Take 1 Tab by mouth daily.  rivaroxaban (XARELTO) 20 mg tab tablet Take 1 Tab by mouth daily.  calcium-cholecalciferol, d3, (CALCIUM 600 + D) 600-125 mg-unit tab Take 1 Tab by mouth daily. No current facility-administered medications for this visit. Vitals:    12/17/20 1051   BP: 119/70   Pulse: 61   Resp: 18   SpO2: 99%   Weight: 156 lb (70.8 kg)   Height: 5' 4\" (1.626 m)       I have reviewed the nurses notes, vitals, problem list, allergy list, medical history, family, social history and medications. Review of Symptoms:    General: Pt denies excessive weight gain or loss. Pt is able to conduct ADL's  HEENT: Denies blurred vision, headaches, epistaxis and difficulty swallowing. Respiratory: Denies shortness of breath, RUANO, wheezing or stridor. Cardiovascular: Denies precordial pain, palpitations, edema or PND  Gastrointestinal: Denies poor appetite, indigestion, abdominal pain or blood in stool  Urinary: Denies dysuria, pyuria  Musculoskeletal: Denies pain or swelling from muscles or joints  Neurologic: Denies tremor, paresthesias, or sensory motor disturbance  Skin: Denies rash, itching or texture change. Psych: Denies depression      Physical Exam:      General: Well developed, in no acute distress. HEENT: Eyes - PERRL, no jvd  Heart:  Normal S1/S2 negative S3 or S4. Regular, no murmur, gallop or rub. Respiratory: Clear bilaterally x 4, no wheezing or rales  Extremities:  No edema, normal cap refill, no cyanosis. Musculoskeletal: No clubbing  Neuro: A&Ox3, speech clear, gait stable. Skin: Skin color is normal. No rashes or lesions. Non diaphoretic. no ulcers  Vascular: 2+ pulses symmetric in all extremities  Psych - judgement intact and orientation is wnl       Cardiographics    Ekg: Sinus  Rhythm  - frequent PAC s   # PACs = 2. Ventricular rate 63    No results found for this or any previous visit.       Lab Results   Component Value Date/Time    WBC 7.5 09/12/2017 11:56 AM    HGB 14.3 09/12/2017 11:56 AM    HCT 44.0 09/12/2017 11:56 AM    PLATELET 841 13/82/1508 11:56 AM    MCV 94 09/12/2017 11:56 AM      Lab Results   Component Value Date/Time    Sodium 137 09/16/2017 03:18 AM    Potassium 4.2 09/16/2017 03:18 AM    Chloride 108 09/16/2017 03:18 AM    CO2 22 09/16/2017 03:18 AM    Anion gap 7 09/16/2017 03:18 AM    Glucose 90 09/16/2017 03:18 AM    BUN 12 09/16/2017 03:18 AM    Creatinine 0.58 09/16/2017 03:18 AM    BUN/Creatinine ratio 21 (H) 09/16/2017 03:18 AM    GFR est AA >60 09/16/2017 03:18 AM    GFR est non-AA >60 09/16/2017 03:18 AM    Calcium 7.1 (L) 09/16/2017 03:18 AM    Bilirubin, total 0.4 09/12/2017 11:56 AM    Alk. phosphatase 80 09/12/2017 11:56 AM    Protein, total 6.6 09/12/2017 11:56 AM    Albumin 4.2 09/12/2017 11:56 AM    A-G Ratio 1.8 09/12/2017 11:56 AM    ALT (SGPT) 16 09/12/2017 11:56 AM      No results found for: TSH, TSH2, TSH3, TSHP, TSHEXT, TSHEXT        Assessment:           ICD-10-CM ICD-9-CM    1. Atrial fibrillation, unspecified type ([facility]  I48.91 427.31 AMB POC EKG ROUTINE W/ 12 LEADS, INTER & REP   2. Paroxysmal atrial fibrillation (HCC)  I48.0 427.31 AMB POC EKG ROUTINE W/ 12 LEADS, INTER & REP   3. S/P ablation of atrial fibrillation  Z98.890 V45.89 AMB POC EKG ROUTINE W/ 12 LEADS, INTER & REP    Z86.79     4. Encounter for loop recorder check  Z45.09 V53.39 AMB POC EKG ROUTINE W/ 12 LEADS, INTER & REP   5. Essential hypertension  I10 401.9 AMB POC EKG ROUTINE W/ 12 LEADS, INTER & REP     Orders Placed This Encounter    AMB POC EKG ROUTINE W/ 12 LEADS, INTER & REP     Order Specific Question:   Reason for Exam:     Answer:   routine        Plan:     Ms. Nanci Spann is here for follow up s/p AF/AFL ablation in 9/2017. She is doing well, and has occasional palpitations. EKG shows NSR with frequent PACs.  ILR sinus with PAC and brief episode of AT in Oct. She was asymptomatic. Darlin Brewer will continue  BB therapy and Xarelto and follow up with us in 1 year.     Continue medical management for HTN, AF.     Thank you for allowing me to participate in Hitesh Ilanahaseeb 's care.       Jas Hinkle NP

## 2021-03-05 RX ORDER — METOPROLOL SUCCINATE 25 MG/1
25 TABLET, EXTENDED RELEASE ORAL DAILY
Qty: 90 TAB | Refills: 0 | Status: SHIPPED | OUTPATIENT
Start: 2021-03-05 | End: 2021-06-08

## 2021-03-05 NOTE — TELEPHONE ENCOUNTER
Received fax from [facility] requesting refill on metoprolol  Last OV was 12/17/20  Last filled on 12/4/20 #90 with 0 refills

## 2021-03-18 ENCOUNTER — OFFICE VISIT (OUTPATIENT)
Dept: CARDIOLOGY CLINIC | Age: 76
End: 2021-03-18
Payer: MEDICARE

## 2021-03-18 DIAGNOSIS — I48.91 ATRIAL FIBRILLATION, UNSPECIFIED TYPE (HCC): Primary | ICD-10-CM

## 2021-03-18 DIAGNOSIS — Z45.09 ENCOUNTER FOR LOOP RECORDER CHECK: ICD-10-CM

## 2021-03-18 PROCEDURE — 93298 REM INTERROG DEV EVAL SCRMS: CPT | Performed by: INTERNAL MEDICINE

## 2021-04-07 ENCOUNTER — APPOINTMENT (OUTPATIENT)
Dept: CT IMAGING | Age: 76
End: 2021-04-07
Attending: STUDENT IN AN ORGANIZED HEALTH CARE EDUCATION/TRAINING PROGRAM
Payer: MEDICARE

## 2021-04-07 ENCOUNTER — HOSPITAL ENCOUNTER (EMERGENCY)
Age: 76
Discharge: HOME | End: 2021-04-07
Attending: STUDENT IN AN ORGANIZED HEALTH CARE EDUCATION/TRAINING PROGRAM
Payer: MEDICARE

## 2021-04-07 ENCOUNTER — TELEPHONE (OUTPATIENT)
Dept: CARDIOLOGY CLINIC | Age: 76
End: 2021-04-07

## 2021-04-07 VITALS
SYSTOLIC BLOOD PRESSURE: 148 MMHG | RESPIRATION RATE: 18 BRPM | OXYGEN SATURATION: 100 % | HEART RATE: 100 BPM | DIASTOLIC BLOOD PRESSURE: 77 MMHG | TEMPERATURE: 98 F

## 2021-04-07 DIAGNOSIS — S00.12XA CONTUSION OF LEFT PERIOCULAR REGION, INITIAL ENCOUNTER: ICD-10-CM

## 2021-04-07 DIAGNOSIS — M25.561 ACUTE PAIN OF RIGHT KNEE: ICD-10-CM
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
## 2021-04-07 PROCEDURE — 70450 CT HEAD/BRAIN W/O DYE: CPT

## 2021-04-07 PROCEDURE — 99283 EMERGENCY DEPT VISIT LOW MDM: CPT

## 2021-04-07 NOTE — TELEPHONE ENCOUNTER
Verified patient with 2 identifiers   Patient is driving home from [place]. Was visiting with her son and fell in the yard five days ago. When she fell she hit her head. She does have a \" black eye\". She is concerned about internal bleeding, as she is taking xarelto. She did attempt to go to ER yesterday in [facility] ER however ER was \" full\". Advised patient to go to ER when she gets back today to be evaluated  Patient verified understanding and agrees.

## 2021-04-07 NOTE — ED TRIAGE NOTES
Pt reports she fell/tripped last week at her sons house and hit her head on a car. Pt reports she takes xarelto. Pt also reports R knee pain post fall. Pt reports David Thornton is just here to make certain she doesn't have any bleeding in her head. \"

## 2021-04-07 NOTE — ED PROVIDER NOTES
Patient is a 77year old female with a history of a-fib on xarelto who presents to ED c/o head injury s/p fall 5 days ago. Patient reports she was letting out her dogs and was standing on a rock that seemed to tip causing her to fall and hit her head and right knee landing on grass. Patient reports the area of impact was her left upper forehead near the temple and her right knee. Patient reports she was initially \"stunned\" following the fall, but states she remembers the entire event. Patient reports bruising to her head and right knee. Patient reports slight pain to right knee but states she has full ROM without difficulty and also c/o neck stiffness. Patient denies any visual changes, confusion, fatigue, altered mental status, neck pain, chest pain, shortness of breath, numbness, tingling and focal weakness. Patient reports her daughter found out that she fell and had not been seen, and advised her not to take her dose of xarelto last night and to be seen in ED. Patient reports her and her  went on a walk in the park prior to coming to ED.             Past Medical History:   Diagnosis Date    A-fib [facility])        Past Surgical History:   Procedure Laterality Date    COLONOSCOPY N/A 7/11/2017    COLONOSCOPY performed by Hussein Dickerson MD at [address] HX AFIB ABLATION      HX ORTHOPAEDIC      bunionectomy right foot    [place] BREAST SURGERY PROCEDURE UNLISTED      breadt biopsy left         Family History:   Problem Relation Age of Onset    High Cholesterol Mother     Other Sister 67        Porphyrea    Coronary Artery Disease Brother     High Cholesterol Brother        Social History     Socioeconomic History    Marital status:      Spouse name: Not on file    Number of children: Not on file    Years of education: Not on file    Highest education level: Not on file   Occupational History    Not on file   Social Needs    Financial resource strain: Not on file    Food insecurity Worry: Not on file     Inability: Not on file    Transportation needs     Medical: Not on file     Non-medical: Not on file   Tobacco Use    Smoking status: Former Smoker     Packs/day: 0.50     Years: 6.00     Pack years: 3.00     Quit date: 1970     Years since quittin.6    Smokeless tobacco: Never Used   Substance and Sexual Activity    Alcohol use: Yes     Comment: occasional wine    Drug use: No    Sexual activity: Not on file   Lifestyle    Physical activity     Days per week: Not on file     Minutes per session: Not on file    Stress: Not on file   Relationships    Social connections     Talks on phone: Not on file     Gets together: Not on file     Attends Latter-day service: Not on file     Active member of club or organization: Not on file     Attends meetings of clubs or organizations: Not on file     Relationship status: Not on file    Intimate partner violence     Fear of current or ex partner: Not on file     Emotionally abused: Not on file     Physically abused: Not on file     Forced sexual activity: Not on file   Other Topics Concern    Not on file   Social History Narrative    Not on file         ALLERGIES: Patient has no known allergies. Review of Systems   Constitutional: Negative for chills and fever. Positive for fall, head injury    HENT: Negative for congestion and sore throat. Eyes: Negative for pain and discharge. Respiratory: Negative for cough and shortness of breath. Cardiovascular: Negative for chest pain. Gastrointestinal: Negative for abdominal pain, diarrhea, nausea and vomiting. Genitourinary: Negative for dysuria and urgency. Musculoskeletal: Positive for arthralgias and neck stiffness. Negative for back pain and neck pain. Skin: Negative for rash. Allergic/Immunologic: Negative for immunocompromised state.    Neurological: Negative for dizziness, seizures, syncope, facial asymmetry, speech difficulty, weakness, light-headedness, numbness and headaches. Hematological: Bruises/bleeds easily. Psychiatric/Behavioral: Negative for confusion. All other systems reviewed and are negative. Vitals:    04/07/21 1418   BP: (!) 148/77   Pulse: 100   Resp: 18   Temp: 98 °F (36.7 °C)   SpO2: 100%            Physical Exam  Vitals signs and nursing note reviewed. Constitutional:       General: She is not in acute distress. Appearance: Normal appearance. She is well-developed. She is not toxic-appearing. HENT:      Head: Normocephalic and atraumatic. Right Ear: Tympanic membrane, ear canal and external ear normal.      Left Ear: Tympanic membrane, ear canal and external ear normal.      Nose: Nose normal.      Mouth/Throat:      Mouth: Mucous membranes are moist.   Eyes:      General: Lids are normal. Vision grossly intact. No visual field deficit. Extraocular Movements: Extraocular movements intact. Conjunctiva/sclera: Conjunctivae normal.      Pupils: Pupils are equal, round, and reactive to light. Neck:      Musculoskeletal: Normal range of motion and neck supple. Cardiovascular:      Rate and Rhythm: Normal rate and regular rhythm. Pulses: Normal pulses. Heart sounds: Normal heart sounds, S1 normal and S2 normal.   Pulmonary:      Effort: Pulmonary effort is normal. No accessory muscle usage. Breath sounds: Normal breath sounds. Abdominal:      Palpations: Abdomen is soft. Musculoskeletal: Normal range of motion. Comments: Ecchymosis and swelling noted to right knee. Full ROM of right knee without pain. No obvious effusion. Ambulates without difficulty. Skin:     General: Skin is warm and dry. Capillary Refill: Capillary refill takes less than 2 seconds. Comments: Ecchymosis noted to left temple and right knee. Neurological:      General: No focal deficit present. Mental Status: She is alert and oriented to person, place, and time. Mental status is at baseline. Psychiatric:         Attention and Perception: Attention normal.         Mood and Affect: Mood and affect normal.         Speech: Speech normal.         Behavior: Behavior is cooperative. Thought Content: Thought content normal.         Cognition and Memory: Cognition normal.         Judgment: Judgment normal.          MDM  Number of Diagnoses or Management Options  Diagnosis management comments: Patient with fall and head injury 5 days prior on xarelto. Has small amount of ecchymosis to left temple and right knee. Otherwise no visual changes, LOC, headache, confusion, fatigue, AMS. VSS. Head CT negative for any acute abnormalities. Will d/c patient with plan for her to continue xarelto as prescribed and if she has a fall in the future to seek care ASAP.         Amount and/or Complexity of Data Reviewed  Tests in the radiology section of CPT®: reviewed  Discuss the patient with other providers: yes (Dr. Cinthia Cabral, ED Attending )           Procedures

## 2021-06-08 RX ORDER — METOPROLOL SUCCINATE 25 MG/1
TABLET, EXTENDED RELEASE ORAL
Qty: 90 TABLET | Refills: 0 | Status: SHIPPED | OUTPATIENT
Start: 2021-06-08 | End: 2021-09-29 | Stop reason: SDUPTHER

## 2021-06-17 ENCOUNTER — OFFICE VISIT (OUTPATIENT)
Dept: CARDIOLOGY CLINIC | Age: 76
End: 2021-06-17
Payer: MEDICARE

## 2021-06-17 DIAGNOSIS — I48.0 PAROXYSMAL ATRIAL FIBRILLATION (HCC): Primary | ICD-10-CM

## 2021-06-17 DIAGNOSIS — Z45.09 ENCOUNTER FOR LOOP RECORDER CHECK: ICD-10-CM

## 2021-06-17 PROCEDURE — 93291 INTERROG DEV EVAL SCRMS IP: CPT

## 2021-06-17 PROCEDURE — 93298 REM INTERROG DEV EVAL SCRMS: CPT | Performed by: INTERNAL MEDICINE

## 2021-06-18 ENCOUNTER — TELEPHONE (OUTPATIENT)
Dept: CARDIOLOGY CLINIC | Age: 76
End: 2021-06-18

## 2021-06-18 NOTE — TELEPHONE ENCOUNTER
Verified patient with two identifiers. Pt informed her link recorder is EOS. She can unplug her monitor and return it to us. She will keep us posted on any change in symptoms and follow up routinely. She has a annual appt with Rishi Sun NP in Dec. Pt verbalized understanding.

## 2021-08-19 ENCOUNTER — TELEPHONE (OUTPATIENT)
Dept: CARDIOLOGY CLINIC | Age: 76
End: 2021-08-19

## 2021-08-19 NOTE — TELEPHONE ENCOUNTER
Called verbal order for xarelto  and metoprolol for one month supply to [facility] in [place], per Melisa HENDERSON.

## 2021-08-19 NOTE — TELEPHONE ENCOUNTER
Patient called because she is out of town for 3 weeks and has left her prescription for her Xarelto and Metoprolol and would like to get a refill of both sent to the [facility] in [place] and their number is [phone]- patient can be reached at her mobile number. Patient states she had originally called yesterday but has not heard from anyone.       Thanks  [employer]

## 2021-09-29 RX ORDER — METOPROLOL SUCCINATE 25 MG/1
25 TABLET, EXTENDED RELEASE ORAL DAILY
Qty: 90 TABLET | Refills: 0 | Status: SHIPPED | OUTPATIENT
Start: 2021-09-29 | End: 2021-12-17 | Stop reason: SDUPTHER

## 2021-09-29 NOTE — TELEPHONE ENCOUNTER
Pt called her pharmacy to send over a refill request and the pharmacay told her to call us. It is regarding a refill for metoprolol succinate. Her pharmacy is [facility]. She is asking for a 90 day prescription.  Call back is [phone]      Thanks  Anny Iyer

## 2021-12-17 NOTE — TELEPHONE ENCOUNTER
Pt called regarding a refill for metoprolol 25 mg . She states she is going to [place] for two months starting January and wont have enough to last her during that time. She would like a new 3 month prescripition sent to [facility].       Callback is [phone]    Thanks  Marile Smith

## 2021-12-17 NOTE — TELEPHONE ENCOUNTER
Last OV was 12/17/21  Next is scheduled for 12/21/21  Patient would like a 3 month supply sent to [facility] - as her insurance is changing in January.

## 2021-12-20 RX ORDER — METOPROLOL SUCCINATE 25 MG/1
25 TABLET, EXTENDED RELEASE ORAL DAILY
Qty: 90 TABLET | Refills: 3 | Status: SHIPPED | OUTPATIENT
Start: 2021-12-20 | End: 2021-12-21 | Stop reason: SDUPTHER

## 2021-12-21 ENCOUNTER — OFFICE VISIT (OUTPATIENT)
Dept: CARDIOLOGY CLINIC | Age: 76
End: 2021-12-21
Payer: MEDICARE

## 2021-12-21 VITALS
OXYGEN SATURATION: 97 % | BODY MASS INDEX: 27.18 KG/M2 | HEART RATE: 68 BPM | SYSTOLIC BLOOD PRESSURE: 128 MMHG | WEIGHT: 159.2 LBS | HEIGHT: 64 IN | RESPIRATION RATE: 18 BRPM | DIASTOLIC BLOOD PRESSURE: 70 MMHG

## 2021-12-21 DIAGNOSIS — I48.0 PAROXYSMAL ATRIAL FIBRILLATION (HCC): Primary | ICD-10-CM

## 2021-12-21 DIAGNOSIS — Z86.79 S/P ABLATION OF ATRIAL FIBRILLATION: ICD-10-CM

## 2021-12-21 DIAGNOSIS — I10 ESSENTIAL HYPERTENSION: ICD-10-CM

## 2021-12-21 DIAGNOSIS — Z98.890 S/P ABLATION OF ATRIAL FIBRILLATION: ICD-10-CM

## 2021-12-21 PROCEDURE — G8432 DEP SCR NOT DOC, RNG: HCPCS | Performed by: NURSE PRACTITIONER

## 2021-12-21 PROCEDURE — G8419 CALC BMI OUT NRM PARAM NOF/U: HCPCS | Performed by: NURSE PRACTITIONER

## 2021-12-21 PROCEDURE — G8427 DOCREV CUR MEDS BY ELIG CLIN: HCPCS | Performed by: NURSE PRACTITIONER

## 2021-12-21 PROCEDURE — G0463 HOSPITAL OUTPT CLINIC VISIT: HCPCS | Performed by: NURSE PRACTITIONER

## 2021-12-21 PROCEDURE — G8536 NO DOC ELDER MAL SCRN: HCPCS | Performed by: NURSE PRACTITIONER

## 2021-12-21 PROCEDURE — 99214 OFFICE O/P EST MOD 30 MIN: CPT | Performed by: NURSE PRACTITIONER

## 2021-12-21 PROCEDURE — 93005 ELECTROCARDIOGRAM TRACING: CPT | Performed by: NURSE PRACTITIONER

## 2021-12-21 PROCEDURE — 3017F COLORECTAL CA SCREEN DOC REV: CPT | Performed by: NURSE PRACTITIONER

## 2021-12-21 PROCEDURE — G8400 PT W/DXA NO RESULTS DOC: HCPCS | Performed by: NURSE PRACTITIONER

## 2021-12-21 PROCEDURE — 93010 ELECTROCARDIOGRAM REPORT: CPT | Performed by: NURSE PRACTITIONER

## 2021-12-21 PROCEDURE — 1101F PT FALLS ASSESS-DOCD LE1/YR: CPT | Performed by: NURSE PRACTITIONER

## 2021-12-21 PROCEDURE — 1090F PRES/ABSN URINE INCON ASSESS: CPT | Performed by: NURSE PRACTITIONER

## 2021-12-21 RX ORDER — METOPROLOL SUCCINATE 25 MG/1
25 TABLET, EXTENDED RELEASE ORAL DAILY
Qty: 90 TABLET | Refills: 3 | Status: SHIPPED | OUTPATIENT
Start: 2021-12-21

## 2021-12-21 NOTE — PROGRESS NOTES
1. Have you been to the ER, urgent care clinic since your last visit? Hospitalized since your last visit?  4-7-2021 [id number] [place], s/p fall. 2. Have you seen or consulted any other health care providers outside of the [address] [place] [address] since your last visit? Include any pap smears or colon screening. No      Chief Complaint   Patient presents with    Irregular Heart Beat     Yearly appt. No cardiac concerns.

## 2021-12-21 NOTE — PROGRESS NOTES
ELECTROPHYSIOLOGY        Subjective:      Angel Gonzalez is a 77 y.o. female is here for EP follow up. The patient denies chest pain/ shortness of breath, orthopnea, PND, LE edema, palpitations, syncope, presyncope or fatigue. Had only one mechanical fall this year where she hit her head. Eval was negative for [facility]. Is interested in [place]. Angel Gonzalez  is on [address] [place] [address] Road, reports no melena, hematuria, or obvious signs of bleeding.     Continues to walk regularly for her venous insufficiency         Patient Active Problem List    Diagnosis Date Noted    Irregular heartbeat 2018    S/P ablation of atrial fibrillation 2017    Status post placement of implantable loop recorder 2017    [facility]-fib ([facility] 09/15/2017    Heart rate fast 2017    Paroxysmal atrial fibrillation ([facility] 2017      Laisha Tucker MD  Past Medical History:   Diagnosis Date    A[facility]fib [facility])     Long term current use of anticoagulant therapy       Past Surgical History:   Procedure Laterality Date    COLONOSCOPY N/A 2017    COLONOSCOPY performed by Mattie Rayo MD at [address] HX AFIB ABLATION      HX ORTHOPAEDIC      bunionectomy right foot    [place] BREAST SURGERY PROCEDURE UNLISTED      breadt biopsy left     No Known Allergies   Family History   Problem Relation Age of Onset    High Cholesterol Mother     Other Sister 67        Porphyrea    Coronary Art Dis Brother     High Cholesterol Brother     negative for cardiac disease  Social History     Socioeconomic History    Marital status:    Tobacco Use    Smoking status: Former Smoker     Packs/day: 0.50     Years: 6.00     Pack years: 3.00     Quit date: 1970     Years since quittin.3    Smokeless tobacco: Never Used   Substance and Sexual Activity    Alcohol use: Yes     Comment: occasional wine    Drug use: No     Current Outpatient Medications   Medication Sig    metoprolol succinate (TOPROL-XL) 25 mg XL tablet Take 1 Tablet by mouth daily.  rivaroxaban (Xarelto) 20 mg tab tablet Take 1 Tab by mouth daily.  calcium-cholecalciferol, d3, (CALCIUM 600 + D) 600-125 mg-unit tab Take 1 Tab by mouth daily. No current facility-administered medications for this visit. Vitals:    21 1110   BP: 128/70   Pulse: 68   Resp: 18   SpO2: 97%   Weight: 159 lb 3.2 oz (72.2 kg)   Height: 5' 4\" (1.626 m)       I have reviewed the nurses notes, vitals, problem list, allergy list, medical history, family, social history and medications. Review of Symptoms:  11 systems reviewed, negative other than as stated in the HPI      Physical Exam:      General: Well developed, in no acute distress. HEENT: Eyes - PERRL  Heart:  Normal S1/S2 negative S3 or S4. Regular, no murmur  Respiratory: Clear bilaterally x 4, no wheezing or rales  Extremities:  No edema, no cyanosis. Musculoskeletal: No clubbing  Neuro: A&Ox3, speech clear  Skin: No visible rashes or lesions. Non diaphoretic. No visible ulcers  Vascular: 2+ pulses symmetric in all extremities  Psych - judgement intact and orientation is wnl       Cardiographics    Ek21  Sinus with PACs    No results found for this or any previous visit.       Lab Results   Component Value Date/Time    WBC 7.5 2017 11:56 AM    HGB 14.3 2017 11:56 AM    HCT 44.0 2017 11:56 AM    PLATELET 652  11:56 AM    MCV 94 2017 11:56 AM      Lab Results   Component Value Date/Time    Sodium 137 2017 03:18 AM    Potassium 4.2 2017 03:18 AM    Chloride 108 2017 03:18 AM    CO2 22 2017 03:18 AM    Anion gap 7 2017 03:18 AM    Glucose 90 2017 03:18 AM    BUN 12 2017 03:18 AM    Creatinine 0.58 2017 03:18 AM    BUN/Creatinine ratio 21 (H) 2017 03:18 AM    GFR est AA >60 2017 03:18 AM    GFR est non-AA >60 2017 03:18 AM    Calcium 7.1 (L) 2017 03:18 AM    Bilirubin, total 0.4 2017 11:56 AM Alk. phosphatase 80 09/12/2017 11:56 AM    Protein, total 6.6 09/12/2017 11:56 AM    Albumin 4.2 09/12/2017 11:56 AM    A-G Ratio 1.8 09/12/2017 11:56 AM    ALT (SGPT) 16 09/12/2017 11:56 AM      No results found for: TSH, TSH2, TSH3, TSHP, TSHEXT        Assessment:           ICD-10-CM ICD-9-CM    1. Paroxysmal atrial fibrillation (HCC)  I48.0 427.31 AMB POC EKG ROUTINE W/ 12 LEADS, INTER & REP     Orders Placed This Encounter    AMB POC EKG ROUTINE W/ 12 LEADS, INTER & REP     Order Specific Question:   Reason for Exam:     Answer:   routine        Plan:     Ms. Omer Hoyos is here for follow up s/p AF/AFL ablation in 9/2017. She is doing well, and has occasional palpitations. EKG shows NSR with frequent PACs. The patient has a CHADSVASC/CHADS 2 score of 3 for the diagnosis/diagnoses of female and age. She should avoid long term anticoagulation due to HAS BLED score of 2 and past/current medical history of age and medication usage predisposing to bleeding. The patient feels strongly that anticoagulation and documented stroke risk greatly impacts his/her quality of life. The patient is a candidate for Watchman, a left atrial appendage closure (LAAC) device to reduce risk of thromboembolism. The patient has need for anticoagulation and is considered a high risk for stroke, but has a relative contraindication for long term anticoagulation. The patient is suitable for short term warfarin but deemed unable to take long term oral anticoagulation following the conclusion of shared decision making interaction with the patient. I have discussed at length the risks/benefits and alternatives of this procedure with regards to stroke risk versus bleeding risk. The patient understands that anticoagulation will be maintained in a variety of forms during the first year and agrees with plan.  Risks include but not limited to: infection, bleeding, vessel injury, cardiac perforation at times requiring drainage or surgery, heart failure, migration of the device, emergency surgery, myocardial infarction, stroke and death. Based on both stroke and bleeding risk, a shared decision has been made to pursue closure of left atrial appendage as a safe and effective alternative to oral anticoagulant therapy for stroke prophylaxis and to reduce their long term risk of bleeding. Will follow up with Dr Uzma Guerra in March for April implant. Addressed all patient questions and concerns at this visit. Continue medical management for AF. Discussed side effects, efficacy and good medication compliance with pt re: metoprolol and oac. Thank you for allowing me to participate in Elifbill Duvall 's care. Brina Moreno NP          Patient was made aware during visit today that all testing completed would be instantaneously available on their MyChart for review. Discussed that these results will be made available to the provider at the same time. They were advised to wait at least 3 business days to allow for provider's interpretation of results with follow-up before calling our office with concerns about their results.

## 2022-03-10 ENCOUNTER — OFFICE VISIT (OUTPATIENT)
Dept: CARDIOLOGY CLINIC | Age: 77
End: 2022-03-10
Payer: MEDICARE

## 2022-03-10 VITALS
SYSTOLIC BLOOD PRESSURE: 130 MMHG | DIASTOLIC BLOOD PRESSURE: 80 MMHG | HEART RATE: 56 BPM | OXYGEN SATURATION: 98 % | BODY MASS INDEX: 26.91 KG/M2 | WEIGHT: 157.6 LBS | HEIGHT: 64 IN | RESPIRATION RATE: 16 BRPM

## 2022-03-10 DIAGNOSIS — I48.0 PAROXYSMAL ATRIAL FIBRILLATION (HCC): Primary | ICD-10-CM

## 2022-03-10 DIAGNOSIS — I10 ESSENTIAL HYPERTENSION: ICD-10-CM

## 2022-03-10 PROCEDURE — G0463 HOSPITAL OUTPT CLINIC VISIT: HCPCS | Performed by: INTERNAL MEDICINE

## 2022-03-10 PROCEDURE — G8427 DOCREV CUR MEDS BY ELIG CLIN: HCPCS | Performed by: INTERNAL MEDICINE

## 2022-03-10 PROCEDURE — G8536 NO DOC ELDER MAL SCRN: HCPCS | Performed by: INTERNAL MEDICINE

## 2022-03-10 PROCEDURE — 1101F PT FALLS ASSESS-DOCD LE1/YR: CPT | Performed by: INTERNAL MEDICINE

## 2022-03-10 PROCEDURE — 3017F COLORECTAL CA SCREEN DOC REV: CPT | Performed by: INTERNAL MEDICINE

## 2022-03-10 PROCEDURE — G8510 SCR DEP NEG, NO PLAN REQD: HCPCS | Performed by: INTERNAL MEDICINE

## 2022-03-10 PROCEDURE — G8754 DIAS BP LESS 90: HCPCS | Performed by: INTERNAL MEDICINE

## 2022-03-10 PROCEDURE — 1090F PRES/ABSN URINE INCON ASSESS: CPT | Performed by: INTERNAL MEDICINE

## 2022-03-10 PROCEDURE — G8400 PT W/DXA NO RESULTS DOC: HCPCS | Performed by: INTERNAL MEDICINE

## 2022-03-10 PROCEDURE — G8752 SYS BP LESS 140: HCPCS | Performed by: INTERNAL MEDICINE

## 2022-03-10 PROCEDURE — G8419 CALC BMI OUT NRM PARAM NOF/U: HCPCS | Performed by: INTERNAL MEDICINE

## 2022-03-10 PROCEDURE — 99215 OFFICE O/P EST HI 40 MIN: CPT | Performed by: INTERNAL MEDICINE

## 2022-03-10 NOTE — PROGRESS NOTES
Chief Complaint   Patient presents with    Follow-up     Discuss Watchman procedure    Irregular Heart Beat     1. Have you been to the ER, urgent care clinic since your last visit? No  Hospitalized since your last visit? No    2. Have you seen or consulted any other health care providers outside of the [address] [place] [address] since your last visit? No Include any pap smears or colon screening. No    Patient is here to discuss watchman procedure.

## 2022-03-10 NOTE — LETTER
3/10/2022    Patient: Wil Song   YOB: 1945   Date of Visit: 3/10/2022     Jaspreet Ashby MD  [address] [facility] [address]  Via Fax: [phone]    Dear Jaspreet Ashby MD,      Thank you for referring Ms. Wylene Apgar to [facility] for evaluation. My notes for this consultation are attached. If you have questions, please do not hesitate to call me. I look forward to following your patient along with you.       Sincerely,    Faith Elias MD

## 2022-03-10 NOTE — PROGRESS NOTES
Subjective:      Ashlyn Mosher is a 77 y.o. female is here for EP consult. The patient denies chest pain/ shortness of breath, orthopnea, PND, LE edema, palpitations, syncope, presyncope or fatigue. Patient Active Problem List    Diagnosis Date Noted    Irregular heartbeat 2018    S/P ablation of atrial fibrillation 2017    Status post placement of implantable loop recorder 2017    A-fib ([facility] [address] 09/15/2017    Heart rate fast 2017    Paroxysmal atrial fibrillation ([facility] [address] 2017      Wood Anderson MD  Past Medical History:   Diagnosis Date    A-fib [facility])     Long term current use of anticoagulant therapy       Past Surgical History:   Procedure Laterality Date    COLONOSCOPY N/A 2017    COLONOSCOPY performed by Ismael Palmer MD at [address] HX AFIB ABLATION      HX HEART CATHETERIZATION  2018    A-ablation    HX ORTHOPAEDIC      bunionectomy right foot    [place] BREAST SURGERY PROCEDURE UNLISTED      breadt biopsy left     No Known Allergies   Family History   Problem Relation Age of Onset    High Cholesterol Mother     Other Sister 67        Porphyrea    Coronary Art Dis Brother     High Cholesterol Brother     negative for cardiac disease  Social History     Socioeconomic History    Marital status:    Tobacco Use    Smoking status: Former Smoker     Packs/day: 0.50     Years: 6.00     Pack years: 3.00     Quit date: 1970     Years since quittin.5    Smokeless tobacco: Never Used   Substance and Sexual Activity    Alcohol use: Yes     Comment: occasional wine    Drug use: No    Sexual activity: Yes     Partners: Male     Current Outpatient Medications   Medication Sig    metoprolol succinate (TOPROL-XL) 25 mg XL tablet Take 1 Tablet by mouth daily.  rivaroxaban (Xarelto) 20 mg tab tablet Take 1 Tablet by mouth daily.  calcium-cholecalciferol, d3, (CALCIUM 600 + D) 600-125 mg-unit tab Take 1 Tab by mouth daily. No current facility-administered medications for this visit. Vitals:    03/10/22 0958   BP: 130/80   Pulse: (!) 56   Resp: 16   SpO2: 98%   Weight: 157 lb 9.6 oz (71.5 kg)   Height: 5' 4\" (1.626 m)       I have reviewed the nurses notes, vitals, problem list, allergy list, medical history, family, social history and medications. Review of Symptoms:    General: Pt denies excessive weight gain or loss. Pt is able to conduct ADL's  HEENT: Denies blurred vision, headaches, hearing loss, epistaxis and difficulty swallowing. Respiratory: Denies cough, congestion, shortness of breath, RUANO, wheezing or stridor. Cardiovascular: Denies precordial pain, palpitations, edema or PND  Gastrointestinal: Denies poor appetite, indigestion, abdominal pain or blood in stool  Genitourinary: Denies hematuria, dysuria, increased urinary frequency  Musculoskeletal: Denies joint pain or swelling from muscles or joints  Neurologic: Denies tremor, paresthesias, headache, or sensory motor disturbance  Psychiatric: Denies confusion, insomnia, depression  Integumentray: Denies rash, itching or ulcers. Hematologic: Denies easy bruising, bleeding    Physical Exam:      General: Well developed, in no acute distress. HEENT: Eyes - PERRL, no jvd  Heart:  Normal S1/S2 negative S3 or S4. Regular, no murmur, gallop or rub. Respiratory: Clear bilaterally x 4, no wheezing or rales  Abdomen:   Soft, non-tender, bowel sounds are active. Extremities:  No edema, normal cap refill, no cyanosis. Musculoskeletal: No clubbing  Neuro: A&Ox3, speech clear, gait stable. Skin: Skin color is normal. No rashes or lesions. Non diaphoretic, no ulcers or subcutaneous nodule  Vascular: 2+ pulses symmetric in all extremities  Psych - judgement intact and orientation is wnl     Cardiographics    Ekg: nsr    No results found for this or any previous visit.       Lab Results   Component Value Date/Time    WBC 7.5 09/12/2017 11:56 AM    HGB 14.3 09/12/2017 11:56 AM    HCT 44.0 09/12/2017 11:56 AM    PLATELET 028 51/50/9805 11:56 AM    MCV 94 09/12/2017 11:56 AM      Lab Results   Component Value Date/Time    Sodium 137 09/16/2017 03:18 AM    Potassium 4.2 09/16/2017 03:18 AM    Chloride 108 09/16/2017 03:18 AM    CO2 22 09/16/2017 03:18 AM    Anion gap 7 09/16/2017 03:18 AM    Glucose 90 09/16/2017 03:18 AM    BUN 12 09/16/2017 03:18 AM    Creatinine 0.58 09/16/2017 03:18 AM    BUN/Creatinine ratio 21 (H) 09/16/2017 03:18 AM    GFR est AA >60 09/16/2017 03:18 AM    GFR est non-AA >60 09/16/2017 03:18 AM    Calcium 7.1 (L) 09/16/2017 03:18 AM    Bilirubin, total 0.4 09/12/2017 11:56 AM    Alk. phosphatase 80 09/12/2017 11:56 AM    Protein, total 6.6 09/12/2017 11:56 AM    Albumin 4.2 09/12/2017 11:56 AM    A-G Ratio 1.8 09/12/2017 11:56 AM    ALT (SGPT) 16 09/12/2017 11:56 AM         Assessment:     Assessment:        ICD-10-CM ICD-9-CM    1. Paroxysmal atrial fibrillation (HCC)  I48.0 427.31    2. Essential hypertension  I10 401.9      No orders of the defined types were placed in this encounter. Plan:   Jose Cabrera is doing well. In sinus sp af abl in 2017. Stable and compliant with oac. She had a fall last year and would like to come off oac. The patient has a CHADSVASC/CHADS 2 score of 3 for the diagnosis/diagnoses of female and age. She should avoid long term anticoagulation due to HAS BLED score of 2 and past/current medical history of age and medication usage predisposing to bleeding. The patient feels strongly that anticoagulation and documented stroke risk greatly impacts his/her quality of life. The patient is a candidate for Watchman, a left atrial appendage closure (LAAC) device to reduce risk of thromboembolism. The patient has need for anticoagulation and is considered a high risk for stroke, but has a relative contraindication for long term anticoagulation.  The patient is suitable for short term warfarin but deemed unable to take long term oral anticoagulation following the conclusion of shared decision making interaction with the patient. I have discussed at length the risks/benefits and alternatives of this procedure with regards to stroke risk versus bleeding risk. The patient understands that anticoagulation will be maintained in a variety of forms during the first year and agrees with plan. Risks include but not limited to: infection, bleeding, vessel injury, cardiac perforation at times requiring drainage or surgery, heart failure, migration of the device, emergency surgery, myocardial infarction, stroke and death.      Based on both stroke and bleeding risk, a shared decision has been made to pursue closure of left atrial appendage as a safe and effective alternative to oral anticoagulant therapy for stroke prophylaxis and to reduce their long term risk of bleeding. We will schedule her soon. Thank you for allowing me to participate in Ashlyn Mosher 's care.     Jemma Albrecht MD, Willy Abbasi

## 2022-03-18 PROBLEM — R00.0 HEART RATE FAST: Status: ACTIVE | Noted: 2017-09-11

## 2022-03-19 PROBLEM — I49.9 IRREGULAR HEARTBEAT: Status: ACTIVE | Noted: 2018-04-12

## 2022-03-19 PROBLEM — Z86.79 S/P ABLATION OF ATRIAL FIBRILLATION: Status: ACTIVE | Noted: 2017-09-16

## 2022-03-19 PROBLEM — Z98.890 S/P ABLATION OF ATRIAL FIBRILLATION: Status: ACTIVE | Noted: 2017-09-16

## 2022-03-19 PROBLEM — Z95.818 STATUS POST PLACEMENT OF IMPLANTABLE LOOP RECORDER: Status: ACTIVE | Noted: 2017-09-16

## 2022-03-19 PROBLEM — I48.0 PAROXYSMAL ATRIAL FIBRILLATION (HCC): Status: ACTIVE | Noted: 2017-09-11

## 2022-03-20 PROBLEM — I48.91 A-FIB (HCC): Status: ACTIVE | Noted: 2017-09-15

## 2022-05-06 ENCOUNTER — HOSPITAL ENCOUNTER (OUTPATIENT)
Dept: GENERAL RADIOLOGY | Age: 77
Discharge: HOME | End: 2022-05-06
Attending: INTERNAL MEDICINE
Payer: MEDICARE

## 2022-05-06 ENCOUNTER — HOSPITAL ENCOUNTER (OUTPATIENT)
Dept: PREADMISSION TESTING | Age: 77
Discharge: HOME | End: 2022-05-06
Attending: INTERNAL MEDICINE
Payer: MEDICARE

## 2022-05-06 VITALS
DIASTOLIC BLOOD PRESSURE: 69 MMHG | TEMPERATURE: 97.8 F | HEART RATE: 65 BPM | OXYGEN SATURATION: 100 % | BODY MASS INDEX: 26.5 KG/M2 | HEIGHT: 64 IN | RESPIRATION RATE: 16 BRPM | WEIGHT: 155.2 LBS | SYSTOLIC BLOOD PRESSURE: 127 MMHG

## 2022-05-06 LAB
ANION GAP SERPL CALC-SCNC: 2 MMOL/L (ref 5–15)
APPEARANCE UR: CLEAR
BACTERIA URNS QL MICRO: NEGATIVE /HPF
BILIRUB UR QL: NEGATIVE
BUN SERPL-MCNC: 18 MG/DL (ref 6–20)
BUN/CREAT SERPL: 21 (ref 12–20)
CALCIUM SERPL-MCNC: 8.9 MG/DL (ref 8.5–10.1)
CHLORIDE SERPL-SCNC: 106 MMOL/L (ref 97–108)
CO2 SERPL-SCNC: 30 MMOL/L (ref 21–32)
COLOR UR: NORMAL
CREAT SERPL-MCNC: 0.85 MG/DL (ref 0.55–1.02)
EPITH CASTS URNS QL MICRO: NORMAL /LPF
ERYTHROCYTE [DISTWIDTH] IN BLOOD BY AUTOMATED COUNT: 13.5 % (ref 11.5–14.5)
GLUCOSE SERPL-MCNC: 86 MG/DL (ref 65–100)
GLUCOSE UR STRIP.AUTO-MCNC: NEGATIVE MG/DL
HCT VFR BLD AUTO: 40.5 % (ref 35–47)
HGB BLD-MCNC: 12.9 G/DL (ref 11.5–16)
HGB UR QL STRIP: NEGATIVE
HYALINE CASTS URNS QL MICRO: NORMAL /LPF (ref 0–2)
KETONES UR QL STRIP.AUTO: NEGATIVE MG/DL
LEUKOCYTE ESTERASE UR QL STRIP.AUTO: NEGATIVE
MCH RBC QN AUTO: 30 PG (ref 26–34)
MCHC RBC AUTO-ENTMCNC: 31.9 G/DL (ref 30–36.5)
MCV RBC AUTO: 94.2 FL (ref 80–99)
NITRITE UR QL STRIP.AUTO: NEGATIVE
NRBC # BLD: 0 K/UL (ref 0–0.01)
NRBC BLD-RTO: 0 PER 100 WBC
PH UR STRIP: 7 [PH] (ref 5–8)
PLATELET # BLD AUTO: 201 K/UL (ref 150–400)
PMV BLD AUTO: 9.3 FL (ref 8.9–12.9)
POTASSIUM SERPL-SCNC: 4.8 MMOL/L (ref 3.5–5.1)
PROT UR STRIP-MCNC: NEGATIVE MG/DL
RBC # BLD AUTO: 4.3 M/UL (ref 3.8–5.2)
RBC #/AREA URNS HPF: NORMAL /HPF (ref 0–5)
SODIUM SERPL-SCNC: 138 MMOL/L (ref 136–145)
SP GR UR REFRACTOMETRY: 1
UA: UC IF INDICATED,UAUC: NORMAL
UROBILINOGEN UR QL STRIP.AUTO: 0.2 EU/DL (ref 0.2–1)
WBC # BLD AUTO: 4.6 K/UL (ref 3.6–11)
WBC URNS QL MICRO: NORMAL /HPF (ref 0–4)

## 2022-05-06 PROCEDURE — 93005 ELECTROCARDIOGRAM TRACING: CPT

## 2022-05-06 PROCEDURE — 36415 COLL VENOUS BLD VENIPUNCTURE: CPT

## 2022-05-06 PROCEDURE — 85027 COMPLETE CBC AUTOMATED: CPT

## 2022-05-06 PROCEDURE — 71046 X-RAY EXAM CHEST 2 VIEWS: CPT

## 2022-05-06 PROCEDURE — 80048 BASIC METABOLIC PNL TOTAL CA: CPT

## 2022-05-06 PROCEDURE — 81001 URINALYSIS AUTO W/SCOPE: CPT

## 2022-05-07 LAB
ATRIAL RATE: 60 BPM
BACTERIA SPEC CULT: NORMAL
BACTERIA SPEC CULT: NORMAL
CALCULATED P AXIS, ECG09: 60 DEGREES
CALCULATED R AXIS, ECG10: 58 DEGREES
CALCULATED T AXIS, ECG11: 62 DEGREES
DIAGNOSIS, 93000: NORMAL
P-R INTERVAL, ECG05: 170 MS
Q-T INTERVAL, ECG07: 422 MS
QRS DURATION, ECG06: 80 MS
QTC CALCULATION (BEZET), ECG08: 422 MS
SERVICE CMNT-IMP: NORMAL
VENTRICULAR RATE, ECG03: 60 BPM

## 2022-05-09 NOTE — PERIOP NOTES
PAT done 5/6/22. Instructions deferred to Dr Dwayne Zacarias office for [facility] procedure. Patient verbalizes understanding. Called Dr Dwayne Zacarias office and spoke to Kimberly Stringer and asked Dr Archie Pitt to review EKG on 5/6/22. Faxed ekg to Dr Sarath Nguyen office. Fax confirmed. Call back number provided for questions.

## 2022-05-11 ENCOUNTER — ANESTHESIA EVENT (OUTPATIENT)
Dept: CARDIAC CATH/INVASIVE PROCEDURES | Age: 77
DRG: 274 | End: 2022-05-11
Payer: MEDICARE

## 2022-05-11 ENCOUNTER — APPOINTMENT (OUTPATIENT)
Dept: CARDIAC CATH/INVASIVE PROCEDURES | Age: 77
DRG: 274 | End: 2022-05-11
Attending: INTERNAL MEDICINE
Payer: MEDICARE

## 2022-05-11 ENCOUNTER — HOSPITAL ENCOUNTER (INPATIENT)
Age: 77
LOS: 1 days | Discharge: HOME | DRG: 274 | End: 2022-05-11
Attending: INTERNAL MEDICINE | Admitting: INTERNAL MEDICINE
Payer: MEDICARE

## 2022-05-11 ENCOUNTER — ANESTHESIA (OUTPATIENT)
Dept: CARDIAC CATH/INVASIVE PROCEDURES | Age: 77
DRG: 274 | End: 2022-05-11
Payer: MEDICARE

## 2022-05-11 VITALS
DIASTOLIC BLOOD PRESSURE: 45 MMHG | SYSTOLIC BLOOD PRESSURE: 103 MMHG | RESPIRATION RATE: 16 BRPM | HEART RATE: 77 BPM | BODY MASS INDEX: 26.12 KG/M2 | WEIGHT: 153 LBS | HEIGHT: 64 IN | OXYGEN SATURATION: 97 % | TEMPERATURE: 97.6 F

## 2022-05-11 DIAGNOSIS — I48.91 ATRIAL FIBRILLATION, UNSPECIFIED TYPE (HCC): ICD-10-CM

## 2022-05-11 LAB — ACT BLD: 303 SECS (ref 79–138)

## 2022-05-11 PROCEDURE — 33340 PERQ CLSR TCAT L ATR APNDGE: CPT

## 2022-05-11 PROCEDURE — 74011000258 HC RX REV CODE- 258: Performed by: NURSE ANESTHETIST, CERTIFIED REGISTERED

## 2022-05-11 PROCEDURE — 74011250636 HC RX REV CODE- 250/636: Performed by: NURSE ANESTHETIST, CERTIFIED REGISTERED

## 2022-05-11 PROCEDURE — 03HY32Z INSERTION OF MONITORING DEVICE INTO UPPER ARTERY, PERCUTANEOUS APPROACH: ICD-10-PCS | Performed by: ANESTHESIOLOGY

## 2022-05-11 PROCEDURE — 77030002996 HC SUT SLK J&J -A: Performed by: INTERNAL MEDICINE

## 2022-05-11 PROCEDURE — 77030018729 HC ELECTRD DEFIB PAD CARD -B: Performed by: INTERNAL MEDICINE

## 2022-05-11 PROCEDURE — C1889 IMPLANT/INSERT DEVICE, NOC: HCPCS | Performed by: INTERNAL MEDICINE

## 2022-05-11 PROCEDURE — 77030008684 HC TU ET CUF COVD -B: Performed by: ANESTHESIOLOGY

## 2022-05-11 PROCEDURE — 74011250637 HC RX REV CODE- 250/637: Performed by: INTERNAL MEDICINE

## 2022-05-11 PROCEDURE — 85347 COAGULATION TIME ACTIVATED: CPT

## 2022-05-11 PROCEDURE — 93312 ECHO TRANSESOPHAGEAL: CPT

## 2022-05-11 PROCEDURE — 77030026438 HC STYL ET INTUB CARD -A: Performed by: ANESTHESIOLOGY

## 2022-05-11 PROCEDURE — 76210000006 HC OR PH I REC 0.5 TO 1 HR

## 2022-05-11 PROCEDURE — 76060000033 HC ANESTHESIA 1 TO 1.5 HR: Performed by: INTERNAL MEDICINE

## 2022-05-11 PROCEDURE — B24BZZ4 ULTRASONOGRAPHY OF HEART WITH AORTA, TRANSESOPHAGEAL: ICD-10-PCS | Performed by: INTERNAL MEDICINE

## 2022-05-11 PROCEDURE — 02L73DK OCCLUSION OF LEFT ATRIAL APPENDAGE WITH INTRALUMINAL DEVICE, PERCUTANEOUS APPROACH: ICD-10-PCS | Performed by: INTERNAL MEDICINE

## 2022-05-11 PROCEDURE — 77030013079 HC BLNKT BAIR HGGR 3M -A: Performed by: ANESTHESIOLOGY

## 2022-05-11 PROCEDURE — 65270000046 HC RM TELEMETRY

## 2022-05-11 PROCEDURE — C1894 INTRO/SHEATH, NON-LASER: HCPCS | Performed by: INTERNAL MEDICINE

## 2022-05-11 PROCEDURE — 77030016707 HC CATH ANGI DX SUPT1 CARD -B: Performed by: INTERNAL MEDICINE

## 2022-05-11 PROCEDURE — 2709999900 HC NON-CHARGEABLE SUPPLY: Performed by: INTERNAL MEDICINE

## 2022-05-11 PROCEDURE — 74011000250 HC RX REV CODE- 250: Performed by: NURSE ANESTHETIST, CERTIFIED REGISTERED

## 2022-05-11 PROCEDURE — 74011000636 HC RX REV CODE- 636: Performed by: INTERNAL MEDICINE

## 2022-05-11 PROCEDURE — 77030008543 HC TBNG MON PRSS MRTM -A: Performed by: INTERNAL MEDICINE

## 2022-05-11 DEVICE — LEFT ATRIAL APPENDAGE CLOSURE DEVICE WITH DELIVERY SYSTEM
Type: IMPLANTABLE DEVICE | Status: FUNCTIONAL
Brand: WATCHMAN FLX™

## 2022-05-11 RX ORDER — MIDAZOLAM HYDROCHLORIDE 1 MG/ML
1 INJECTION, SOLUTION INTRAMUSCULAR; INTRAVENOUS AS NEEDED
Status: CANCELLED | OUTPATIENT
Start: 2022-05-11

## 2022-05-11 RX ORDER — ONDANSETRON 2 MG/ML
INJECTION INTRAMUSCULAR; INTRAVENOUS AS NEEDED
Status: DISCONTINUED | OUTPATIENT
Start: 2022-05-11 | End: 2022-05-11 | Stop reason: HOSPADM

## 2022-05-11 RX ORDER — GLYCOPYRROLATE 0.2 MG/ML
INJECTION INTRAMUSCULAR; INTRAVENOUS AS NEEDED
Status: DISCONTINUED | OUTPATIENT
Start: 2022-05-11 | End: 2022-05-11 | Stop reason: HOSPADM

## 2022-05-11 RX ORDER — HYDROMORPHONE HYDROCHLORIDE 1 MG/ML
0.2 INJECTION, SOLUTION INTRAMUSCULAR; INTRAVENOUS; SUBCUTANEOUS
Status: DISCONTINUED | OUTPATIENT
Start: 2022-05-11 | End: 2022-05-11 | Stop reason: HOSPADM

## 2022-05-11 RX ORDER — SODIUM CHLORIDE 0.9 % (FLUSH) 0.9 %
5-40 SYRINGE (ML) INJECTION EVERY 8 HOURS
Status: DISCONTINUED | OUTPATIENT
Start: 2022-05-11 | End: 2022-05-11 | Stop reason: HOSPADM

## 2022-05-11 RX ORDER — PROTAMINE SULFATE 10 MG/ML
INJECTION, SOLUTION INTRAVENOUS AS NEEDED
Status: DISCONTINUED | OUTPATIENT
Start: 2022-05-11 | End: 2022-05-11 | Stop reason: HOSPADM

## 2022-05-11 RX ORDER — SODIUM CHLORIDE 0.9 % (FLUSH) 0.9 %
5-40 SYRINGE (ML) INJECTION EVERY 8 HOURS
Status: CANCELLED | OUTPATIENT
Start: 2022-05-11

## 2022-05-11 RX ORDER — DEXAMETHASONE SODIUM PHOSPHATE 4 MG/ML
INJECTION, SOLUTION INTRA-ARTICULAR; INTRALESIONAL; INTRAMUSCULAR; INTRAVENOUS; SOFT TISSUE AS NEEDED
Status: DISCONTINUED | OUTPATIENT
Start: 2022-05-11 | End: 2022-05-11 | Stop reason: HOSPADM

## 2022-05-11 RX ORDER — SODIUM CHLORIDE 0.9 % (FLUSH) 0.9 %
5-40 SYRINGE (ML) INJECTION AS NEEDED
Status: DISCONTINUED | OUTPATIENT
Start: 2022-05-11 | End: 2022-05-11 | Stop reason: HOSPADM

## 2022-05-11 RX ORDER — FENTANYL CITRATE 50 UG/ML
25 INJECTION, SOLUTION INTRAMUSCULAR; INTRAVENOUS
Status: DISCONTINUED | OUTPATIENT
Start: 2022-05-11 | End: 2022-05-11 | Stop reason: HOSPADM

## 2022-05-11 RX ORDER — NEOSTIGMINE METHYLSULFATE 1 MG/ML
INJECTION, SOLUTION INTRAVENOUS AS NEEDED
Status: DISCONTINUED | OUTPATIENT
Start: 2022-05-11 | End: 2022-05-11 | Stop reason: HOSPADM

## 2022-05-11 RX ORDER — ACETAMINOPHEN 325 MG/1
650 TABLET ORAL
Status: DISCONTINUED | OUTPATIENT
Start: 2022-05-11 | End: 2022-05-11 | Stop reason: HOSPADM

## 2022-05-11 RX ORDER — SODIUM CHLORIDE, SODIUM LACTATE, POTASSIUM CHLORIDE, CALCIUM CHLORIDE 600; 310; 30; 20 MG/100ML; MG/100ML; MG/100ML; MG/100ML
50 INJECTION, SOLUTION INTRAVENOUS CONTINUOUS
Status: CANCELLED | OUTPATIENT
Start: 2022-05-11 | End: 2022-05-12

## 2022-05-11 RX ORDER — LIDOCAINE HYDROCHLORIDE 10 MG/ML
0.1 INJECTION, SOLUTION EPIDURAL; INFILTRATION; INTRACAUDAL; PERINEURAL AS NEEDED
Status: CANCELLED | OUTPATIENT
Start: 2022-05-11

## 2022-05-11 RX ORDER — SODIUM CHLORIDE 0.9 % (FLUSH) 0.9 %
5-40 SYRINGE (ML) INJECTION AS NEEDED
Status: CANCELLED | OUTPATIENT
Start: 2022-05-11

## 2022-05-11 RX ORDER — FENTANYL CITRATE 50 UG/ML
50 INJECTION, SOLUTION INTRAMUSCULAR; INTRAVENOUS AS NEEDED
Status: CANCELLED | OUTPATIENT
Start: 2022-05-11

## 2022-05-11 RX ORDER — ACETAMINOPHEN 325 MG/1
650 TABLET ORAL ONCE
Status: CANCELLED | OUTPATIENT
Start: 2022-05-11 | End: 2022-05-11

## 2022-05-11 RX ORDER — HEPARIN SODIUM 1000 [USP'U]/ML
INJECTION, SOLUTION INTRAVENOUS; SUBCUTANEOUS AS NEEDED
Status: DISCONTINUED | OUTPATIENT
Start: 2022-05-11 | End: 2022-05-11 | Stop reason: HOSPADM

## 2022-05-11 RX ORDER — ROCURONIUM BROMIDE 10 MG/ML
INJECTION, SOLUTION INTRAVENOUS AS NEEDED
Status: DISCONTINUED | OUTPATIENT
Start: 2022-05-11 | End: 2022-05-11 | Stop reason: HOSPADM

## 2022-05-11 RX ORDER — SODIUM CHLORIDE 9 MG/ML
INJECTION, SOLUTION INTRAVENOUS
Status: DISCONTINUED | OUTPATIENT
Start: 2022-05-11 | End: 2022-05-11 | Stop reason: HOSPADM

## 2022-05-11 RX ORDER — HYDROCODONE BITARTRATE AND ACETAMINOPHEN 5; 325 MG/1; MG/1
1 TABLET ORAL
Status: DISCONTINUED | OUTPATIENT
Start: 2022-05-11 | End: 2022-05-11 | Stop reason: HOSPADM

## 2022-05-11 RX ORDER — FENTANYL CITRATE 50 UG/ML
INJECTION, SOLUTION INTRAMUSCULAR; INTRAVENOUS AS NEEDED
Status: DISCONTINUED | OUTPATIENT
Start: 2022-05-11 | End: 2022-05-11 | Stop reason: HOSPADM

## 2022-05-11 RX ORDER — LIDOCAINE HYDROCHLORIDE 20 MG/ML
INJECTION, SOLUTION EPIDURAL; INFILTRATION; INTRACAUDAL; PERINEURAL AS NEEDED
Status: DISCONTINUED | OUTPATIENT
Start: 2022-05-11 | End: 2022-05-11 | Stop reason: HOSPADM

## 2022-05-11 RX ORDER — ONDANSETRON 2 MG/ML
4 INJECTION INTRAMUSCULAR; INTRAVENOUS AS NEEDED
Status: DISCONTINUED | OUTPATIENT
Start: 2022-05-11 | End: 2022-05-11 | Stop reason: HOSPADM

## 2022-05-11 RX ORDER — PROPOFOL 10 MG/ML
INJECTION, EMULSION INTRAVENOUS AS NEEDED
Status: DISCONTINUED | OUTPATIENT
Start: 2022-05-11 | End: 2022-05-11 | Stop reason: HOSPADM

## 2022-05-11 RX ADMIN — ACETAMINOPHEN 650 MG: 325 TABLET ORAL at 12:15

## 2022-05-11 RX ADMIN — GLYCOPYRROLATE 0.4 MG: 0.2 INJECTION, SOLUTION INTRAMUSCULAR; INTRAVENOUS at 10:23

## 2022-05-11 RX ADMIN — HEPARIN SODIUM 10000 UNITS: 1000 INJECTION, SOLUTION INTRAVENOUS; SUBCUTANEOUS at 09:39

## 2022-05-11 RX ADMIN — PHENYLEPHRINE HYDROCHLORIDE 10 MCG/MIN: 10 INJECTION INTRAVENOUS at 09:33

## 2022-05-11 RX ADMIN — SODIUM CHLORIDE: 9 INJECTION, SOLUTION INTRAVENOUS at 09:14

## 2022-05-11 RX ADMIN — LIDOCAINE HYDROCHLORIDE 40 MG: 20 INJECTION, SOLUTION INTRAVENOUS at 09:26

## 2022-05-11 RX ADMIN — DEXAMETHASONE SODIUM PHOSPHATE 4 MG: 4 INJECTION, SOLUTION INTRAMUSCULAR; INTRAVENOUS at 09:32

## 2022-05-11 RX ADMIN — PROPOFOL 200 MG: 10 INJECTION, EMULSION INTRAVENOUS at 09:26

## 2022-05-11 RX ADMIN — ONDANSETRON HYDROCHLORIDE 4 MG: 2 INJECTION, SOLUTION INTRAMUSCULAR; INTRAVENOUS at 09:18

## 2022-05-11 RX ADMIN — ROCURONIUM BROMIDE 30 MG: 10 INJECTION INTRAVENOUS at 09:26

## 2022-05-11 RX ADMIN — FENTANYL CITRATE 50 MCG: 50 INJECTION, SOLUTION INTRAMUSCULAR; INTRAVENOUS at 09:18

## 2022-05-11 RX ADMIN — PROTAMINE SULFATE 75 MG: 10 INJECTION, SOLUTION INTRAVENOUS at 10:19

## 2022-05-11 RX ADMIN — Medication 3 MG: at 10:23

## 2022-05-11 RX ADMIN — FENTANYL CITRATE 50 MCG: 50 INJECTION, SOLUTION INTRAMUSCULAR; INTRAVENOUS at 10:16

## 2022-05-11 RX ADMIN — GLYCOPYRROLATE 0.1 MG: 0.2 INJECTION, SOLUTION INTRAMUSCULAR; INTRAVENOUS at 09:18

## 2022-05-11 NOTE — Clinical Note
Transseptal Cath Performed under hemodynamic and Fluoro and ROGER, RF VersaCross wire/ [place] used

## 2022-05-11 NOTE — Clinical Note
Sheath #all: Sheath: removed. Hemostasis achieved.  sheath removed/ figure of eight suture to RIGHT GROIN

## 2022-05-11 NOTE — ANESTHESIA PROCEDURE NOTES
Arterial Line Placement    Start time: 5/11/2022 8:09 AM  Performed by: Roberth Willis MD  Authorized by: Roberth Willis MD     Pre-Procedure  Indications:  Arterial pressure monitoring and blood sampling  Preanesthetic Checklist: patient identified, risks and benefits discussed, anesthesia consent, site marked, patient being monitored, timeout performed and patient being monitored    Timeout Time: 08:09 EDT        Procedure:   Prep:  ChloraPrep  Seldinger Technique?: Yes    Orientation:  Right  Location:  Radial artery  Catheter size:  20 G  Number of attempts:  1    Assessment:   Post-procedure:  Line secured and sterile dressing applied  Patient Tolerance:  Patient tolerated the procedure well with no immediate complications

## 2022-05-11 NOTE — ANESTHESIA PREPROCEDURE EVALUATION
Anesthetic History   No history of anesthetic complications            Review of Systems / Medical History  Patient summary reviewed, nursing notes reviewed and pertinent labs reviewed    Pulmonary  Within defined limits                 Neuro/Psych   Within defined limits           Cardiovascular            Dysrhythmias : atrial fibrillation      Exercise tolerance: >4 METS     GI/Hepatic/Renal               Comments: screening Endo/Other  Within defined limits           Other Findings              Physical Exam    Airway  Mallampati: I  TM Distance: 4 - 6 cm    Mouth opening: Normal     Cardiovascular    Rhythm: regular  Rate: normal         Dental  No notable dental hx       Pulmonary  Breath sounds clear to auscultation               Abdominal  Abdominal exam normal       Other Findings            Anesthetic Plan    ASA: 2  Anesthesia type: general    Monitoring Plan: Arterial line and ROGER      Induction: Intravenous  Anesthetic plan and risks discussed with: Patient

## 2022-05-11 NOTE — ANESTHESIA POSTPROCEDURE EVALUATION
Post-Anesthesia Evaluation and Assessment    Patient: Sharyle Matter MRN: [id number]  SSN: [id number]    YOB: 1945  Age: 69 y.o. Sex: female      I have evaluated the patient and they are stable and ready for discharge from the PACU. Cardiovascular Function/Vital Signs  Visit Vitals  /61   Pulse 60   Temp 36.5 °C (97.7 °F)   Resp 15   Ht 5' 4\" (1.626 m)   Wt 69.4 kg (153 lb)   SpO2 99%   BMI 26.26 kg/m²       Patient is status post General anesthesia for Procedure(s):  WATCHMAN YENNI CLOSURE DEVICE. Nausea/Vomiting: None    Postoperative hydration reviewed and adequate. Pain:  Pain Scale 1: Visual (05/11/22 1045)  Pain Intensity 1: 0 (05/11/22 1045)   Managed    Neurological Status:   Neuro (WDL): Within Defined Limits (05/11/22 1039)   At baseline    Mental Status, Level of Consciousness: Alert and  oriented to person, place, and time    Pulmonary Status:   O2 Device: None (Room air) (05/11/22 1100)   Adequate oxygenation and airway patent    Complications related to anesthesia: None    Post-anesthesia assessment completed.  No concerns    Signed By: Claudia Wang MD     May 11, 2022

## 2022-05-11 NOTE — PROGRESS NOTES
Primary Nurse Silva Orourke RN and Nathan Keenan RN performed a dual skin assessment on this patient No impairment noted  Luis Antonio score is 23

## 2022-05-11 NOTE — Clinical Note
sheath exchanged for SET SHEATH INTRO PGTL 180 CM 45 DEG 8.5 FRX63 CM STD VERSACROSS W/CONN CBL. Hemostasis achieved.  VersaCross access sheath advanced

## 2022-05-11 NOTE — Clinical Note
Sheath #1: Sheath: inserted. Sheath inserted/placed in the right femoral  vein. Hemostasis achieved.  aspirated and flushed

## 2022-05-11 NOTE — H&P
Jennifer Freeman is a 77 y.o. female is here for EP consult. The patient denies chest pain/ shortness of breath, orthopnea, PND, LE edema, palpitations, syncope, presyncope or fatigue.             Patient Active Problem List     Diagnosis Date Noted    Irregular heartbeat 2018    S/P ablation of atrial fibrillation 2017    Status post placement of implantable loop recorder 2017    A-fib ([facility] [address] 09/15/2017    Heart rate fast 2017    Paroxysmal atrial fibrillation ([facility] [address] 2017      Tia Ratliff MD       Past Medical History:   Diagnosis Date    A-fib [facility])      Long term current use of anticoagulant therapy              Past Surgical History:   Procedure Laterality Date    COLONOSCOPY N/A 2017     COLONOSCOPY performed by Alysha Akhtar MD at [address] HX AFIB ABLATION        HX HEART CATHETERIZATION   2018     A-ablation    HX ORTHOPAEDIC         bunionectomy right foot    [place] BREAST SURGERY PROCEDURE UNLISTED         breadt biopsy left      No Known Allergies         Family History   Problem Relation Age of Onset    High Cholesterol Mother      Other Sister 67         Porphyrea    Coronary Art Dis Brother      High Cholesterol Brother      negative for cardiac disease  Social History               Socioeconomic History    Marital status:    Tobacco Use    Smoking status: Former Smoker       Packs/day: 0.50       Years: 6.00       Pack years: 3.00       Quit date: 1970       Years since quittin.5    Smokeless tobacco: Never Used   Substance and Sexual Activity    Alcohol use: Yes       Comment: occasional wine    Drug use: No    Sexual activity: Yes       Partners: Male              Current Outpatient Medications   Medication Sig    metoprolol succinate (TOPROL-XL) 25 mg XL tablet Take 1 Tablet by mouth daily.  rivaroxaban (Xarelto) 20 mg tab tablet Take 1 Tablet by mouth daily.     calcium-cholecalciferol, d3, (CALCIUM 600 + D) 600-125 mg-unit tab Take 1 Tab by mouth daily.      No current facility-administered medications for this visit. Vitals:     03/10/22 0958   BP: 130/80   Pulse: (!) 56   Resp: 16   SpO2: 98%   Weight: 157 lb 9.6 oz (71.5 kg)   Height: 5' 4\" (1.626 m)         I have reviewed the nurses notes, vitals, problem list, allergy list, medical history, family, social history and medications.     Review of Symptoms:     General: Pt denies excessive weight gain or loss. Pt is able to conduct ADL's  HEENT: Denies blurred vision, headaches, hearing loss, epistaxis and difficulty swallowing. Respiratory: Denies cough, congestion, shortness of breath, RUANO, wheezing or stridor. Cardiovascular: Denies precordial pain, palpitations, edema or PND  Gastrointestinal: Denies poor appetite, indigestion, abdominal pain or blood in stool  Genitourinary: Denies hematuria, dysuria, increased urinary frequency  Musculoskeletal: Denies joint pain or swelling from muscles or joints  Neurologic: Denies tremor, paresthesias, headache, or sensory motor disturbance  Psychiatric: Denies confusion, insomnia, depression  Integumentray: Denies rash, itching or ulcers. Hematologic: Denies easy bruising, bleeding     Physical Exam:       General: Well developed, in no acute distress. HEENT: Eyes - PERRL, no jvd  Heart:  Normal S1/S2 negative S3 or S4. Regular, no murmur, gallop or rub.   Respiratory: Clear bilaterally x 4, no wheezing or rales  Abdomen:   Soft, non-tender, bowel sounds are active.   Extremities:  No edema, normal cap refill, no cyanosis. Musculoskeletal: No clubbing  Neuro: A&Ox3, speech clear, gait stable. Skin: Skin color is normal. No rashes or lesions.  Non diaphoretic, no ulcers or subcutaneous nodule  Vascular: 2+ pulses symmetric in all extremities  Psych - judgement intact and orientation is wnl      Cardiographics     Ekg: nsr     No results found for this or any previous visit.              Lab Results   Component Value Date/Time     WBC 7.5 09/12/2017 11:56 AM     HGB 14.3 09/12/2017 11:56 AM     HCT 44.0 09/12/2017 11:56 AM     PLATELET 581 46/11/0471 11:56 AM     MCV 94 09/12/2017 11:56 AM            Lab Results   Component Value Date/Time     Sodium 137 09/16/2017 03:18 AM     Potassium 4.2 09/16/2017 03:18 AM     Chloride 108 09/16/2017 03:18 AM     CO2 22 09/16/2017 03:18 AM     Anion gap 7 09/16/2017 03:18 AM     Glucose 90 09/16/2017 03:18 AM     BUN 12 09/16/2017 03:18 AM     Creatinine 0.58 09/16/2017 03:18 AM     BUN/Creatinine ratio 21 (H) 09/16/2017 03:18 AM     GFR est AA >60 09/16/2017 03:18 AM     GFR est non-AA >60 09/16/2017 03:18 AM     Calcium 7.1 (L) 09/16/2017 03:18 AM     Bilirubin, total 0.4 09/12/2017 11:56 AM     Alk. phosphatase 80 09/12/2017 11:56 AM     Protein, total 6.6 09/12/2017 11:56 AM     Albumin 4.2 09/12/2017 11:56 AM     A-G Ratio 1.8 09/12/2017 11:56 AM     ALT (SGPT) 16 09/12/2017 11:56 AM          Assessment:      Assessment:          ICD-10-CM ICD-9-CM     1. Paroxysmal atrial fibrillation (HCC)  I48.0 427.31     2. Essential hypertension  I10 401. 9        No orders of the defined types were placed in this encounter.         Plan:   Mindy Alvarado is doing well. In sinus sp af abl in 2017. Stable and compliant with oac. She had a fall last year and would like to come off oac.      The patient has a CHADSVASC/CHADS 2 score of 3 for the diagnosis/diagnoses of female and age.   She should avoid long term anticoagulation due to HAS BLED score of 2 and past/current medical history of age and medication usage predisposing to bleeding.  The patient feels strongly that anticoagulation and documented stroke risk greatly impacts his/her quality of life. The patient is a candidate for Watchman, a left atrial appendage closure (LAAC) device to reduce risk of thromboembolism.  The patient has need for anticoagulation and is considered a high risk for stroke, but has a relative contraindication for long term anticoagulation. The patient is suitable for short term warfarin but deemed unable to take long term oral anticoagulation following the conclusion of shared decision making interaction with the patient. I have discussed at length the risks/benefits and alternatives of this procedure with regards to stroke risk versus bleeding risk. The patient understands that anticoagulation will be maintained in a variety of forms during the first year and agrees with plan.  Risks include but not limited to: infection, bleeding, vessel injury, cardiac perforation at times requiring drainage or surgery, heart failure, migration of the device, emergency surgery, myocardial infarction, stroke and death.      Based on both stroke and bleeding risk, a shared decision has been made to pursue closure of left atrial appendage as a safe and effective alternative to oral anticoagulant therapy for stroke prophylaxis and to reduce their long term risk of bleeding. We will schedule her soon.         Thank you for allowing me to participate in Mar Jimenez 's care.     Iva Anderson MD, [facility], [facility]

## 2022-05-11 NOTE — ANESTHESIA PROCEDURE NOTES
ROGER  Date/Time: 5/11/2022 10:55 AM      Procedure Details: probe placement, image aquisition & interpretation    Risks and benefits discussed with the patient and plans are to proceed    Procedure Note    Performed by: Zana Lang MD  Authorized by: Zana Lang MD       Indications: assessment of ascending aorta and assessment of surgical repair  Modalities: 2D, CF, CWD, PWD  Probe Type: biplane and multiplane  Insertion: atraumatic  Patient Status: intubated and sedated    Echocardiographic and Doppler Measurements   Aorta  Size  Diam(cm)  Dissection PlaqueThick(mm)  Plaque Mobile    Ascending normal  No  No    Arch normal  No  No    Descending normal  No  No          Valves  Annulus  Stenosis  Area/Grad  Regurg  Leaflet   Morph  Leaflet   Motion    Aortic normal none  0 normal normal    Mitral normal none  2+ normal normal    Tricuspid normal none  2+ normal normal          Atria  Size  SEC (smoke)  Thrombus  Tumor  Device    Rt Atrium normal No No  No    Lt Atrium normal No No  No     Interatrial Septum Morphology: normal    Interventricular Septum Morphology: normal    Ventricle  Cavity Size  Cavity Dimension Hypertrophy  Thrombus  Gloal FXN  EF    RV normal  No no normal     LV normal   No normal >55%       Regional Function  (1 = normal, 2 = mildly hypokinetic, 3 = severely hypokinetic, 4 = akinetic, 5 = dyskinetic) LAV - [place]   ME LAV = 0  ME LAV = 90  ME LAV = 130   Basal Sept:1 Basal Ant:1 Basal Post:1   Mid Sept:1 Mid Ant:1 Mid Post:1   Apical Sept:1 Apical Ant:1 Basal Ant Sept:1   Basal Lat:1 Basal Inf:1 Mid Ant Sept:1   Mid Lat:1 Mid Inf:1    Apical Lat:1 Apical Inf:1        Pericardium: normal    Post Intervention Follow-up Study  Ventricular Global Function: unchanged  Ventricular Regional Function: unchanged     Valve  Function  Regurgitation  Area    Aortic no change      Mitral no change 2+     Tricuspid no change 2+     Prosthetic        Complications: None  Comments: Exam done for watchman device placement. Normal LVEF with no WMA, normal RV function. Normal appearing aortic valve. Normal appearing mitral valve with mild MR. Normal appearing tricuspid valve with mild TR. Normal sized atrium, YENNI no thrombus, no PFO, no pericardial effusion. Appendage measurements taken by device rep. Live guidance of IAS puncture and device placement. Device well positioned with small shoulder by pulmonary veins, no flow seen around device, appropriate compression documented by rep. IAS with ASD L to R shunt. No pericardial effusion. Rest of exam unchanged.

## 2022-05-11 NOTE — Clinical Note
A venogram was performed on the Other (document in comment). Injected with single hand injection. Injection volume  = 5 mL.  YENNI

## 2022-05-11 NOTE — ROUTINE PROCESS
1130- Pt in from PACU. VSS. SR on monitor. Right groin CDI. Pedal pulses palpable to BLE.   1400- C/O soreness right groin. No s/sx hematoma, soft. Throat sore. Med with tylenol and icee given. 1545- Amb to BR. VSS SR on monitor. Right groin CDI. Voiding, Juan Jose PO.  1620- Discharge instructions reviewed with pt and . Pt verbalized understanding. Right groin cath site CDI. VSS. SR. Pulses pedal WDL. Discharged to home with  via W/C.

## 2022-05-11 NOTE — PROGRESS NOTES
Cardiac Cath Lab Recovery Arrival Note:      Christiano Merritt arrived to Cardiac Cath Lab, Recovery Area. Staff introduced to patient. Patient identifiers verified with NAME and DATE OF BIRTH. Procedure verified with patient. Consent forms reviewed and signed by patient or authorized representative and verified. Allergies verified. Patient and family oriented to department. Patient and family informed of procedure and plan of care. Questions answered with review. Patient prepped for procedure, per orders from physician, prior to arrival.    Patient on cardiac monitor, non-invasive blood pressure, SPO2 monitor. On room air. Patient is A&Ox 3. Patient reports no c/o. Patient in stretcher, in low position, with side rails up, call bell within reach, patient instructed to call if assistance as needed. Patient prep in: [address], [place] [address]. Patient family has pager # none  Family in: [address] waiting area.    Prep by: Lesli Walsh RN

## 2022-05-11 NOTE — DISCHARGE SUMMARY
Cardiology Discharge Summary            [facility]  [address]  [phone]  [place]. [place]     Patient ID:  Mar Jimenez  [id number]  43 y.o.  1945    Admit Date: 5/11/2022    Discharge Date: 5/11/2022     Admitting Physician: Iva Anderson MD     Discharge Physician: Iva Anderson MD    Admission Diagnoses: Atrial fibrillation, unspecified type ([facility] [address] [I48.91]; [facility]) [I48.91]    Discharge Diagnoses: Active Problems:    [facility]) (9/15/2017)        Discharge Condition: Good    Cardiology Procedures this Admission:  [facility] Course: pt had successful watchman implant. She was observed in pacu and had recovery in IVCU. She was ambulated without issue and dc/d to home. Consults: None    Discharge Exam:  Visit Vitals  BP (!) 112/58   Pulse 66   Temp 97.4 °F (36.3 °C)   Resp 17   Ht 5' 4\" (1.626 m)   Wt 153 lb (69.4 kg)   SpO2 97%   BMI 26.26 kg/m²       Abdomen: soft, non-tender  Extremities: extremities normal  Heart: regular rate and rhythm  Lungs: clear to auscultation bilaterally  Neck: no JVD  Neurologic: Grossly normal  Pulses: 2+ and symmetric    Disposition: home    Patient Instructions:   Current Discharge Medication List      CONTINUE these medications which have NOT CHANGED    Details   metoprolol succinate (TOPROL-XL) 25 mg XL tablet Take 1 Tablet by mouth daily. Qty: 90 Tablet, Refills: 3    Comments: Refill [id number]      calcium-cholecalciferol, d3, (CALCIUM 600 + D) 600-125 mg-unit tab Take 1 Tab by mouth daily. rivaroxaban (Xarelto) 20 mg tab tablet Take 1 Tablet by mouth daily. Qty: 90 Tablet, Refills: 3             Referenced discharge instructions provided by nursing for diet and activity.     Follow-up with Dr Reyna Tucker in 1-2 weeks           Signed:  Iva Anderson MD, [facility]  5/11/2022  2:54 PM

## 2022-05-11 NOTE — PROGRESS NOTES
[facility] follow-up PCP transitional care appointment has been scheduled with Dr. Uri Rocha on 5/17/22 at 1100. Pending patient discharge.   Shruthi Brumfield, Care Management Assistant

## 2022-05-11 NOTE — Clinical Note
Sheath #1: sheath exchanged for SET INTRO CHECKFLO 16 FR 13CM -- . Hemostasis achieved.  8fr sheath RFV removed/ 16fr sheath advanced over package wire/ aspirated and flushed

## 2022-05-11 NOTE — Clinical Note
TRANSFER - OUT REPORT:     Verbal report given to: Nelson Huerta, (at bedside). Report consisted of patient's Situation, Background, Assessment and   Recommendations(SBAR). Opportunity for questions and clarification was provided. Patient transported with a Registered Nurse, Monitor and Oxygen. Oxygen used for patient = nasal cannula, @ 2 - 6 Liters.     Patient transported to: PACU.   transported with CRNA and EP Lab staff

## 2022-05-11 NOTE — DISCHARGE INSTRUCTIONS
[facility]  [address] [facility], [address] [facility]  [phone]  [facility]. [facility] [address] [place] [address] INSTRUCTIONS    Patient ID:  Michelle Ignacio  [id number]  69 y.o.  1945    Admit Date: 5/11/2022    Discharge Date: 5/11/2022     Admitting Physician: Noemi Morel MD     Discharge Physician: Noemi Morel MD    Admission Diagnoses:   Atrial fibrillation, unspecified type ([address] [I48.91]  [facility]) [I48.91]    Discharge Diagnoses: Active Problems:    [facility]) (9/15/2017)        Discharge Condition: Good    Cardiology Procedures this Admission:  WATCHMAN implant    Disposition: home    Reference discharge instructions provided by nursing for diet and activity. Follow-up as per schedule. Signed:  Noemi Morel MD  5/11/2022  2:53 PM    S/P WATCHMAN DISCHARGE INSTRUCTIONS    It is normal to feel tired the first couple days. Take it easy and follow the physicians instructions. CHECK THE CATHETER INSERTION SITE DAILY:  You may shower 24 hours after the procedure, remove the bandage during showering. Wash with soap and water and pat dry. Gentle cleaning of the site with soap and water is sufficient, cover with a dry clean dressing or bandage. Do not apply creams or powders to the area. Do not sit in a bathtub or pool of water for 7 days or until wound has completely healed. Temporary bruising and discomfort is normal and may last a few weeks. You may have a  formation of a small lump at the site which may last up to 6 weeks. CALL THE PHYSICIAN:  If the site becomes red, swollen or feels warm to the touch  If there is bleeding or drainage or if there is unusual pain at the groin or down the leg. If there is any bleeding, lie down, apply pressure or have someone apply pressure with a clean cloth until the bleeding stops.   If the bleeding continues, call 911 to be transported to the hospital.  DO NOT [address] [facility] ANYONE ELSE DRIVE YOU - CALL 022. Activity:      For the first 24-48 hours or as instructed by the physician:  No lifting, pushing or pulling over 5 pounds and no straining the insertion site. Do not life grocery bags or the garbage can, do not run the vacuum  or  for 7 days. Start with short walks as in the hospital and gradually increase as tolerated each day. It is recommended to walk 30 minutes 5-7 days per week. Follow your physicians instructions on activity. Avoid walking outside in extremes of heat or cold. Walk inside when it is cold and windy or hot and humid. Things to keep in mind:  No driving for at least 5 days, or as designated by your physician. Limit the number of times you go up and down the stairs  Take rests and pace yourself with activity. Be careful and do not strain with bowel movements. Medications: Take all medications as prescribed  Call your physician if you have any questions  Keep an updated list of your medications with you at all times and give a list to your physician and pharmacist    Signs and Symptoms:  Be cautious of symptoms of angina or recurrent symptoms such as chest discomfort, unusual shortness of breath or fatigue, palpitations. After Care: Follow up with your physician as instructed. Follow a heart healthy diet with proper portion control, daily stress management, daily exercise, blood pressure and cholesterol control , and smoking cessation. AFTER YOU TRANSESOPHAGEAL ECHOCARDIOGRAM    Do not eat or drink for at least two hours after your procedure. Your throat will be numb and there is a risk you might have difficulty swallowing for a while. Be careful when you do eat or drink for the first time especially with hot fluids since you could easily burn your throat. Call your doctor if:    · You are bleeding from your throat or mouth. · You have trouble breathing all of a sudden.   · You have chest pain or any pain that spreads to your neck, jaw, or arms. · You have questions or concerns.   · You have a fever greater than 101°F.

## 2022-05-11 NOTE — Clinical Note
Device catheter removed. Delivery catheter removed, for other reason. Other reason device catheter removed: change size.

## 2022-05-12 NOTE — PROGRESS NOTES
Physician Progress Note      Jordan Ramirez  CSN #:                  [id number]  :                       1945  ADMIT DATE:       2022 6:50 AM  DISCH DATE:        2022 4:31 PM  RESPONDING  PROVIDER #:        Branda Boxer MD          QUERY TEXTGpolomaylinhenry Halter Afternoon    This patient admitted for planned watchman procedure for A-fib    The patient has been on  the anticoagulant  Xarelto until now to maintain the A-fib    If possible, please document in progress notes and discharge summary if you are evaluating and/or treating any of the following: The medical record reflects the following:  Risk Factors: A-fib, Age 69,  Clinical Indicators: PTA patient on Xarelto, CHADSVASC/MYRIAM 2 score of 3 for the dx and age. Treatment: Planned admission for watchman procedure as need for alternative to oral anticoagulant therapy for stroke    Thank you  [facility], [address] [place] [address], [address] [place] [address], [facility]  [phone]  Options provided:  -- Secondary hypercoagulable state in a patient with atrial fibrillation  -- Other - I will add my own diagnosis  -- Disagree - Not applicable / Not valid  -- Disagree - Clinically unable to determine / Unknown  -- Refer to Clinical Documentation Reviewer    PROVIDER RESPONSE TEXT:    Provider disagreed with this query.     Query created by: Garry Arguello on 2022 3:15 PM      Electronically signed by:  Branda Boxer MD 2022 2:35 PM

## 2022-07-12 ENCOUNTER — HOSPITAL ENCOUNTER (OUTPATIENT)
Dept: NON INVASIVE DIAGNOSTICS | Age: 77
Discharge: HOME | End: 2022-07-12
Payer: MEDICARE

## 2022-07-12 VITALS
OXYGEN SATURATION: 95 % | DIASTOLIC BLOOD PRESSURE: 67 MMHG | WEIGHT: 153 LBS | BODY MASS INDEX: 26.26 KG/M2 | SYSTOLIC BLOOD PRESSURE: 132 MMHG | HEART RATE: 67 BPM | RESPIRATION RATE: 13 BRPM

## 2022-07-12 DIAGNOSIS — I48.91 ATRIAL FIBRILLATION, UNSPECIFIED TYPE (HCC): ICD-10-CM

## 2022-07-12 PROCEDURE — 74011250636 HC RX REV CODE- 250/636: Performed by: INTERNAL MEDICINE

## 2022-07-12 PROCEDURE — 99152 MOD SED SAME PHYS/QHP 5/>YRS: CPT

## 2022-07-12 PROCEDURE — 74011000250 HC RX REV CODE- 250: Performed by: INTERNAL MEDICINE

## 2022-07-12 PROCEDURE — 93325 DOPPLER ECHO COLOR FLOW MAPG: CPT

## 2022-07-12 RX ORDER — MIDAZOLAM HYDROCHLORIDE 1 MG/ML
.5-2 INJECTION, SOLUTION INTRAMUSCULAR; INTRAVENOUS
Status: DISCONTINUED | OUTPATIENT
Start: 2022-07-12 | End: 2022-07-12

## 2022-07-12 RX ORDER — LIDOCAINE HYDROCHLORIDE 20 MG/ML
15 SOLUTION OROPHARYNGEAL AS NEEDED
Status: DISCONTINUED | OUTPATIENT
Start: 2022-07-12 | End: 2022-07-12

## 2022-07-12 RX ORDER — CLOPIDOGREL BISULFATE 75 MG/1
75 TABLET ORAL DAILY
Qty: 30 TABLET | Refills: 5 | Status: SHIPPED | OUTPATIENT
Start: 2022-07-12

## 2022-07-12 RX ORDER — FENTANYL CITRATE 50 UG/ML
12.5-5 INJECTION, SOLUTION INTRAMUSCULAR; INTRAVENOUS
Status: DISCONTINUED | OUTPATIENT
Start: 2022-07-12 | End: 2022-07-12

## 2022-07-12 RX ADMIN — FENTANYL CITRATE 25 MCG: 50 INJECTION, SOLUTION INTRAMUSCULAR; INTRAVENOUS at 08:49

## 2022-07-12 RX ADMIN — MIDAZOLAM HYDROCHLORIDE 1 MG: 1 INJECTION, SOLUTION INTRAMUSCULAR; INTRAVENOUS at 08:51

## 2022-07-12 RX ADMIN — MIDAZOLAM HYDROCHLORIDE 1 MG: 1 INJECTION, SOLUTION INTRAMUSCULAR; INTRAVENOUS at 08:49

## 2022-07-12 RX ADMIN — BENZOCAINE, BUTAMBEN, AND TETRACAINE HYDROCHLORIDE 1 SPRAY: .028; .004; .004 AEROSOL, SPRAY TOPICAL at 08:44

## 2022-07-12 RX ADMIN — LIDOCAINE HYDROCHLORIDE 15 ML: 20 SOLUTION ORAL at 08:43

## 2022-07-12 NOTE — DISCHARGE INSTRUCTIONS
DISCHARGE SUMMARY       The following personal items collected during your admission are returned to you:   Dental Appliance:    Vision:    Hearing Aid:    Jewelry:    Clothing:          PATIENT INSTRUCTIONS: Continue taking all the same medications EXCEPT start taking Plavix instead of Eliquis. .      You had a transesophageal echocardiogram, your throat was numbed for the procedure, so start with sips of water and advance diet as swallowing returns to normal. Avoid any scalding hot beverages for the next 2 hours until 1100. Call to make an appointment with Dr. Caren Mobley  in 4  week(s). What to do at Home:  Recommended activity: No driving today      The discharge information has been reviewed with the PATIENT . The PATIENT  verbalized understanding.

## 2022-07-12 NOTE — PROGRESS NOTES
9:25 AM patient transferred to recovery. Verbal report received from [facility].

## 2022-07-12 NOTE — PROGRESS NOTES
Patient arrived to Non-Invasive Cardiology Lab for Out Patient ROGER  Procedure. Staff introduced to patient. Patient identifiers verified with Name and Date of Birth. Procedure verified with patient. Consent forms reviewed and signed by patient or authorized representative and verified. Allergies verified. Patient informed of procedure and plan of care. Questions answered with review. Patient on cardiac monitor, non-invasive blood pressure, SPO2 monitor. On RA . Patient is A&Ox3. Patient reports no complaints. Patient on stretcher, in low position, with side rails up. Patient instructed to call for assistance as needed. Family in waiting room.

## 2022-07-29 PROCEDURE — 93325 DOPPLER ECHO COLOR FLOW MAPG: CPT | Performed by: INTERNAL MEDICINE

## 2022-07-29 PROCEDURE — 93312 ECHO TRANSESOPHAGEAL: CPT | Performed by: INTERNAL MEDICINE

## 2022-08-15 NOTE — PROGRESS NOTES
INR 2.5 Sent message to Dr. Chowdary [facility] . Subjective:      Heather Arambula is a 69 y.o. female is here for 3 mo visit. The patient denies chest pain/ shortness of breath, orthopnea, PND, LE edema, palpitations, syncope, presyncope or fatigue. Aware of occasional elevated HR by checking her pulse. Patient Active Problem List    Diagnosis Date Noted    Irregular heartbeat 2018    S/P ablation of atrial fibrillation 2017    Status post placement of implantable loop recorder 2017    A-fib ([address] 09/15/2017    Heart rate fast 2017    Paroxysmal atrial fibrillation (HCC) 2017      Genevieve Rios MD  History reviewed. No pertinent past medical history. Past Surgical History:   Procedure Laterality Date    BREAST SURGERY PROCEDURE UNLISTED      breadt biopsy left    COLONOSCOPY N/A 2017    COLONOSCOPY performed by Blake Cabrera MD at [address] [facility]      bunionectomy right foot     No Known Allergies   Family History   Problem Relation Age of Onset    High Cholesterol Mother     Other Sister 67        Porphyrea    Coronary Artery Disease Brother     High Cholesterol Brother     negative for cardiac disease  Social History     Socioeconomic History    Marital status:      Spouse name: Not on file    Number of children: Not on file    Years of education: Not on file    Highest education level: Not on file   Tobacco Use    Smoking status: Former Smoker     Packs/day: 0.50     Years: 6.00     Pack years: 3.00     Last attempt to quit: 1970     Years since quittin.2    Smokeless tobacco: Never Used   Substance and Sexual Activity    Alcohol use: Yes     Comment: occasional wine    Drug use: No     Current Outpatient Medications   Medication Sig    rivaroxaban (XARELTO) 20 mg tab tablet Take 1 Tab by mouth daily.  metoprolol succinate (TOPROL-XL) 25 mg XL tablet Take 1 Tab by mouth daily.     calcium-cholecalciferol, d3, (CALCIUM 600 + D) 600-125 mg-unit tab Take 1 Tab by mouth daily. No current facility-administered medications for this visit. Vitals:    12/10/19 1026   BP: 112/70   Pulse: (!) 56   Resp: 18   SpO2: 99%   Weight: 156 lb (70.8 kg)   Height: 5' 4\" (1.626 m)       I have reviewed the nurses notes, vitals, problem list, allergy list, medical history, family, social history and medications. Review of Symptoms:    General: Pt denies excessive weight gain or loss. Pt is able to conduct ADL's  HEENT: Denies blurred vision, headaches, epistaxis and difficulty swallowing. Respiratory: Denies shortness of breath, RUANO, wheezing or stridor. Cardiovascular: Denies precordial pain, palpitations, edema or PND  Gastrointestinal: Denies poor appetite, indigestion, abdominal pain or blood in stool  Urinary: Denies dysuria, pyuria  Musculoskeletal: Denies pain or swelling from muscles or joints  Neurologic: Denies tremor, paresthesias, or sensory motor disturbance  Skin: Denies rash, itching or texture change. Psych: Denies depression    Physical Exam:      General: Well developed, in no acute distress. HEENT: Eyes - PERRL, no jvd  Heart:  Normal S1/S2 negative S3 or S4. Regular, no murmur, gallop or rub. Respiratory: Clear bilaterally x 4, no wheezing or rales  Extremities:  No edema, normal cap refill, no cyanosis. Musculoskeletal: No clubbing  Neuro: A&Ox3, speech clear, gait stable. Skin: Skin color is normal. No rashes or lesions. Non diaphoretic  Vascular: 2+ pulses symmetric in all extremities    Cardiographics    Ekg: sinus PACs    No results found for this or any previous visit.       Lab Results   Component Value Date/Time    WBC 7.5 09/12/2017 11:56 AM    HGB 14.3 09/12/2017 11:56 AM    HCT 44.0 09/12/2017 11:56 AM    PLATELET 078 91/36/2924 11:56 AM    MCV 94 09/12/2017 11:56 AM      Lab Results   Component Value Date/Time    Sodium 137 09/16/2017 03:18 AM    Potassium 4.2 09/16/2017 03:18 AM    Chloride 108 09/16/2017 03:18 AM    CO2 22 09/16/2017 03:18 AM    Anion gap 7 09/16/2017 03:18 AM    Glucose 90 09/16/2017 03:18 AM    BUN 12 09/16/2017 03:18 AM    Creatinine 0.58 09/16/2017 03:18 AM    BUN/Creatinine ratio 21 (H) 09/16/2017 03:18 AM    GFR est AA >60 09/16/2017 03:18 AM    GFR est non-AA >60 09/16/2017 03:18 AM    Calcium 7.1 (L) 09/16/2017 03:18 AM    Bilirubin, total 0.4 09/12/2017 11:56 AM    AST (SGOT) 23 09/12/2017 11:56 AM    Alk. phosphatase 80 09/12/2017 11:56 AM    Protein, total 6.6 09/12/2017 11:56 AM    Albumin 4.2 09/12/2017 11:56 AM    A-G Ratio 1.8 09/12/2017 11:56 AM    ALT (SGPT) 16 09/12/2017 11:56 AM      No results found for: TSH, TSH2, TSH3, TSHP, TSHEXT, TSHEXT     Assessment:             ICD-10-CM ICD-9-CM    1. Atrial fibrillation, unspecified type (HCC) I48.91 427.31 AMB POC EKG ROUTINE W/ 12 LEADS, INTER & REP   2. Palpitation R00.2 785.1    3. Paroxysmal atrial fibrillation (HCC) I48.0 427.31    4. S/P ablation of atrial fibrillation Z98.890 V45.89     Z86.79     5. Essential hypertension I10 401.9      Orders Placed This Encounter    AMB POC EKG ROUTINE W/ 12 LEADS, INTER & REP     Order Specific Question:   Reason for Exam:     Answer:   routine    rivaroxaban (XARELTO) 20 mg tab tablet     Sig: Take 1 Tab by mouth daily. Dispense:  90 Tab     Refill:  3     Order Specific Question:   Expiration Date     Answer:   10/11/2019     Order Specific Question:   Lot#     Answer:   [id number]     Order Specific Question:        Answer:   Ioana Reed Specific Question:   NDC#     Answer:   3 bottles =21 tabs        Plan:     Ms. Hanna Patel is here for follow up s/p AF/AFL ablation in 9/2017. She is doing well, and has occasional palpitations. EKG shows NSR with frequent PACs.  ILR sinus with PAC and brief episodes of AF with RVR this month with VHR of 125 (0.1% of the time).  She was asymptomatic. Taco Feldman will I continue  BB therapy and Xarelto and follow up with us in 1 year.     Continue medical management for HTN, AF. Thank you for allowing me to participate in Kiley Gutierrez 's care. Shreyas Iraheta NP    Patient seen and examined. All pertinent data reviewed. I have reviewed detailed note as outlined by Shreyas Iraheta NP. Case discussed with Nursing/medical assistant staff and Shreyas Iraheta NP. Plans as outlined. Pt is well. Occasional palpitations - one 2 min episode of af on ilr. Cont oac. Cont bb for htn and af/afl. F/u in one year.     Katarzyna Allen MD, [facility]

## 2023-01-05 NOTE — PERIOP NOTES
[address] [facility] from Operating Room to PACU    Report received from [address] S[address] Whitmore and SHERI Nesbitt regarding Sharyle Matter. Surgeon(s):  Anesthesia, Case  And Procedure(s) (LRB):  SPECIAL PROCEDURE OUTSIDE OF OR (N/A)  confirmed   with allergies and dressings discussed. Anesthesia type, drugs, patient history, complications, estimated blood loss, vital signs, intake and output, and last pain medication, lines, reversal medications and temperature were reviewed. 1120 TRANSFER - OUT REPORT:    Verbal report given to 1200 Ivan Whitmore RN(name) on Sharyle Matter  being transferred to IVCU(unit) for routine post - op       Report consisted of patients Situation, Background, Assessment and   Recommendations(SBAR). Information from the following report(s) SBAR, Kardex, Procedure Summary, Intake/Output, MAR and Cardiac Rhythm SR was reviewed with the receiving nurse. Lines:   Arterial Line 05/11/22 Right Radial artery (Active)   Site Assessment Clean, dry, & intact 05/11/22 1115   Dressing Status Clean, dry, & intact 05/11/22 1039   Dressing Type Transparent 05/11/22 1039   Line Status Intact and in place 05/11/22 1039   Treatment Zeroed or re-zeroed 05/11/22 1039   Affected Extremity/Extremities Color distal to insertion site pink (or appropriate for race); Pulses palpable 05/11/22 1039        Opportunity for questions and clarification was provided.       Patient transported with:   Monitor  Registered Nurse  Tech Patient made aware of 24/7 emergency services.

## 2024-11-07 PROBLEM — M16.11 PRIMARY OSTEOARTHRITIS OF RIGHT HIP: Status: ACTIVE | Noted: 2024-11-07

## 2025-01-09 ENCOUNTER — HOSPITAL ENCOUNTER (OUTPATIENT)
Facility: HOSPITAL | Age: 80
Discharge: HOME | End: 2025-01-12
Payer: MEDICARE

## 2025-01-09 LAB
ABO + RH BLD: NORMAL
ANION GAP SERPL CALC-SCNC: 7 MMOL/L (ref 2–12)
APPEARANCE UR: CLEAR
BACTERIA URNS QL MICRO: NEGATIVE /HPF
BILIRUB UR QL: NEGATIVE
BLOOD GROUP ANTIBODIES SERPL: NORMAL
BUN SERPL-MCNC: 20 MG/DL (ref 6–20)
BUN/CREAT SERPL: 29 (ref 12–20)
CALCIUM SERPL-MCNC: 9.2 MG/DL (ref 8.5–10.1)
CHLORIDE SERPL-SCNC: 100 MMOL/L (ref 97–108)
CO2 SERPL-SCNC: 28 MMOL/L (ref 21–32)
COLOR UR: NORMAL
CREAT SERPL-MCNC: 0.68 MG/DL (ref 0.55–1.02)
EKG ATRIAL RATE: 62 BPM
EKG DIAGNOSIS: NORMAL
EKG P AXIS: 71 DEGREES
EKG P-R INTERVAL: 166 MS
EKG Q-T INTERVAL: 410 MS
EKG QRS DURATION: 78 MS
EKG QTC CALCULATION (BAZETT): 416 MS
EKG R AXIS: 52 DEGREES
EKG T AXIS: 51 DEGREES
EKG VENTRICULAR RATE: 62 BPM
EPITH CASTS URNS QL MICRO: NORMAL /LPF
ERYTHROCYTE [DISTWIDTH] IN BLOOD BY AUTOMATED COUNT: 13 % (ref 11.5–14.5)
EST. AVERAGE GLUCOSE BLD GHB EST-MCNC: 94 MG/DL
GLUCOSE SERPL-MCNC: 82 MG/DL (ref 65–100)
GLUCOSE UR STRIP.AUTO-MCNC: NEGATIVE MG/DL
HBA1C MFR BLD: 4.9 % (ref 4–5.6)
HCT VFR BLD AUTO: 40.4 % (ref 35–47)
HGB BLD-MCNC: 13.2 G/DL (ref 11.5–16)
HGB UR QL STRIP: NEGATIVE
HYALINE CASTS URNS QL MICRO: NORMAL /LPF (ref 0–5)
INR PPP: 1 (ref 0.9–1.1)
KETONES UR QL STRIP.AUTO: NEGATIVE MG/DL
LEUKOCYTE ESTERASE UR QL STRIP.AUTO: NEGATIVE
MCH RBC QN AUTO: 30.1 PG (ref 26–34)
MCHC RBC AUTO-ENTMCNC: 32.7 G/DL (ref 30–36.5)
MCV RBC AUTO: 92 FL (ref 80–99)
NITRITE UR QL STRIP.AUTO: NEGATIVE
NRBC # BLD: 0 K/UL (ref 0–0.01)
NRBC BLD-RTO: 0 PER 100 WBC
PH UR STRIP: 7 (ref 5–8)
PLATELET # BLD AUTO: 230 K/UL (ref 150–400)
PMV BLD AUTO: 9.6 FL (ref 8.9–12.9)
POTASSIUM SERPL-SCNC: 4.8 MMOL/L (ref 3.5–5.1)
PROT UR STRIP-MCNC: NEGATIVE MG/DL
PROTHROMBIN TIME: 10.4 SEC (ref 9–11.1)
RBC # BLD AUTO: 4.39 M/UL (ref 3.8–5.2)
RBC #/AREA URNS HPF: NORMAL /HPF (ref 0–5)
SODIUM SERPL-SCNC: 135 MMOL/L (ref 136–145)
SP GR UR REFRACTOMETRY: 1.01 (ref 1–1.03)
SPECIMEN EXP DATE BLD: NORMAL
URINE CULTURE IF INDICATED: NORMAL
UROBILINOGEN UR QL STRIP.AUTO: 0.2 EU/DL (ref 0.2–1)
WBC # BLD AUTO: 6.2 K/UL (ref 3.6–11)
WBC URNS QL MICRO: NORMAL /HPF (ref 0–4)

## 2025-01-09 PROCEDURE — 85610 PROTHROMBIN TIME: CPT

## 2025-01-09 PROCEDURE — 36415 COLL VENOUS BLD VENIPUNCTURE: CPT

## 2025-01-09 PROCEDURE — APPNB30 APP NON BILLABLE TIME 0-30 MINS: Performed by: NURSE PRACTITIONER

## 2025-01-09 PROCEDURE — 93010 ELECTROCARDIOGRAM REPORT: CPT | Performed by: INTERNAL MEDICINE

## 2025-01-09 PROCEDURE — 86901 BLOOD TYPING SEROLOGIC RH(D): CPT

## 2025-01-09 PROCEDURE — 86900 BLOOD TYPING SEROLOGIC ABO: CPT

## 2025-01-09 PROCEDURE — 93005 ELECTROCARDIOGRAM TRACING: CPT | Performed by: ORTHOPAEDIC SURGERY

## 2025-01-09 PROCEDURE — 80048 BASIC METABOLIC PNL TOTAL CA: CPT

## 2025-01-09 PROCEDURE — 81001 URINALYSIS AUTO W/SCOPE: CPT

## 2025-01-09 PROCEDURE — 86850 RBC ANTIBODY SCREEN: CPT

## 2025-01-09 PROCEDURE — 85027 COMPLETE CBC AUTOMATED: CPT

## 2025-01-09 PROCEDURE — 83036 HEMOGLOBIN GLYCOSYLATED A1C: CPT

## 2025-01-09 RX ORDER — SENNOSIDES 8.6 MG
650 CAPSULE ORAL AS NEEDED
COMMUNITY

## 2025-01-09 RX ORDER — MELOXICAM 15 MG/1
15 TABLET ORAL AS NEEDED
COMMUNITY

## 2025-01-09 ASSESSMENT — PROMIS GLOBAL HEALTH SCALE
IN THE PAST 7 DAYS, HOW WOULD YOU RATE YOUR PAIN ON AVERAGE [ON A SCALE FROM 0 (NO PAIN) TO 10 (WORST IMAGINABLE PAIN)]?: 7
IN GENERAL, HOW WOULD YOU RATE YOUR SATISFACTION WITH YOUR SOCIAL ACTIVITIES AND RELATIONSHIPS [ON A SCALE OF 1 (POOR) TO 5 (EXCELLENT)]?: VERY GOOD
SUM OF RESPONSES TO QUESTIONS 2, 4, 5, & 10: 15
IN GENERAL, PLEASE RATE HOW WELL YOU CARRY OUT YOUR USUAL SOCIAL ACTIVITIES (INCLUDES ACTIVITIES AT HOME, AT WORK, AND IN YOUR COMMUNITY, AND RESPONSIBILITIES AS A PARENT, CHILD, SPOUSE, EMPLOYEE, FRIEND, ETC) [ON A SCALE OF 1 (POOR) TO 5 (EXCELLENT)]?: GOOD
SUM OF RESPONSES TO QUESTIONS 3, 6, 7, & 8: 16
HOW IS THE PROMIS V1.1 BEING ADMINISTERED?: PAPER
IN GENERAL, WOULD YOU SAY YOUR HEALTH IS...[ON A SCALE OF 1 (POOR) TO 5 (EXCELLENT)]: VERY GOOD
IN THE PAST 7 DAYS, HOW OFTEN HAVE YOU BEEN BOTHERED BY EMOTIONAL PROBLEMS, SUCH AS FEELING ANXIOUS, DEPRESSED, OR IRRITABLE [ON A SCALE FROM 1 (NEVER) TO 5 (ALWAYS)]?: RARELY
IN GENERAL, WOULD YOU SAY YOUR QUALITY OF LIFE IS...[ON A SCALE OF 1 (POOR) TO 5 (EXCELLENT)]: GOOD
IN GENERAL, HOW WOULD YOU RATE YOUR PHYSICAL HEALTH [ON A SCALE OF 1 (POOR) TO 5 (EXCELLENT)]?: GOOD
IN THE PAST 7 DAYS, HOW WOULD YOU RATE YOUR FATIGUE ON AVERAGE [ON A SCALE FROM 1 (NONE) TO 5 (VERY SEVERE)]?: MODERATE
IN GENERAL, HOW WOULD YOU RATE YOUR MENTAL HEALTH, INCLUDING YOUR MOOD AND YOUR ABILITY TO THINK [ON A SCALE OF 1 (POOR) TO 5 (EXCELLENT)]?: VERY GOOD
WHO IS THE PERSON COMPLETING THE PROMIS V1.1 SURVEY?: SELF
TO WHAT EXTENT ARE YOU ABLE TO CARRY OUT YOUR EVERYDAY PHYSICAL ACTIVITIES SUCH AS WALKING, CLIMBING STAIRS, CARRYING GROCERIES, OR MOVING A CHAIR [ON A SCALE OF 1 (NOT AT ALL) TO 5 (COMPLETELY)]?: MODERATELY

## 2025-01-09 ASSESSMENT — HOOS JR
WALKING ON UNEVEN SURFACE: SEVERE
HOOS JR RAW SCORE: 14
GOING UP OR DOWN STAIRS: SEVERE
HOOS JR RAW SCORE: 14
LYING IN BED (TURNING OVER, MAINTAINING HIP POSITION): MODERATE
SITTING: MODERATE
HOOS JR TOTAL INTERVAL SCORE: 46.652
RISING FROM SITTING: MODERATE
BENDING TO THE FLOOR TO PICK UP OBJECT: MODERATE

## 2025-01-09 NOTE — PERIOP NOTE
[address] [address]   MAIN OR                     [phone]    MAIN PRE OP             [phone]                                                                                AMBULATORY PRE OP          [phone]  PRE-ADMISSION TESTING    [phone]     Surgery Date:  1/21/25       Is surgery arrival time given by surgeon?  YES  NO    If “NO”, [facility] staff will call you between 4 and 7pm the day before your surgery with your arrival time. (If your surgery is on a Monday, we will call you the Friday before.)    Call [phone] after 7pm Monday-Friday if you did not receive this call.    INSTRUCTIONS BEFORE YOUR SURGERY   When You  Arrive Arrive at [facility] Patient Access on 1st floor the day of your surgery.  Have your insurance card, photo ID,living will/advanced directive/POA (if applicable),  and any copayment (if needed)   Food   and   Drink NO solid food after midnight the night before surgery. You can drink clear liquids from midnight until ONE hour prior to your arrival at the hospital on the day of your surgery. Clear liquids include:  Water  Fruit juices without pulp (NO ORANGE JUICE)  Carbonated beverages  Black coffee(no cream/milk)  Tea(no cream/milk)  Gatorade    No alcohol (beer, wine, liquor) or marijuana (smoking) 24 hours, edibles (3 days). Stop smoking cigarettes 14 days before surgery (helps w/healing and breathing).   Medications to   TAKE   Morning of Surgery MEDICATIONS TO TAKE THE MORNING OF SURGERY WITH A SIP OF WATER: METOPROLOL, EYE DROPS    You may take these medications, IF NEEDED, the morning of surgery: TYLENOL 4 HRS PRIOR TO ARRIVAL TIME IF NEEDED.    Ask your surgeon/prescribing doctor for instructions on taking or stopping these medications prior to surgery: NONE   Medications to STOP  before surgery Non-Steroidal anti-inflammatory Drugs (NSAID's): for example, Diclofenac(Voltaren),  Ibuprofen  preparation for surgery. To reduce the germs on your skin you will need to shower with CHG soap (Chorhexidine gluconate 4%) two times before surgery.    CHG soap (Hibiclens, Hex-A-Clens or store brand)  CHG soap will be provided at your Preadmission Testing (PAT) appointment.  If you do not have a PAT appointment before surgery, you may arrange to  CHG soap from our office or purchase CHG soap at a pharmacy, grocery or department store.  You need to purchase TWO 4 ounce bottles to use for your 2 showers.    Shower with CHG soap 2 times before your surgery  The evening before your surgery  The morning of your surgery    Steps to follow:  Wash your hair with your normal shampoo and your body with regular soap and rinse well to remove shampoo and soap from your skin.  Wet a clean washcloth and turn off the shower.  Put CHG soap on washcloth and apply to your entire body from the neck down. Do not use on your head, face or private parts(genitals). Do not use CHG soap on open sores, wounds or areas of skin irritation.  Wash your body gently for 5 minutes. Do not wash your skin too hard. This soap does not create lather. Pay special attention to your underarms and from your belly button to your feet.  Turn the shower back on and rinse well to get CHG soap off your body.  Pat your skin dry with a clean, dry towel. Do not apply lotions or moisturizer.  Put on clean clothes and sleep on fresh bed sheets and do not allow pets to sleep with you.      Tips to help prevent infections after your surgery:  Protect your surgical wound from germs:  Hand washing is the most important thing you and your caregivers can do to prevent infections.  Keep your bandage clean and dry!  Do not touch your surgical wound.  Use clean, freshly washed towels and washcloths every time you shower; do not share bath linens with others.  Until your surgical wound is healed, wear clothing and sleep on bed linens that are clean.  Do not allow pets

## 2025-01-09 NOTE — H&P (VIEW-ONLY)
1/9/25 patient and  attended preop class. all questions answered at this time.   HIP DISABILITY AND OSTEOARTHRITIS OUTCOME SCORE    Pain - What amount of hip pain have you experienced the last week during the following activities?  1. Going up or down stairs: 3  2. Walking on an uneven surface: 3        Function, daily living - Please indicate the degree of difficulty you have experienced in the last week due to your hip  3. Rising from sitting : 2  4. Bending to the floor/ an object: 2  5. Lying in bed (turning over, maintaining hip position): 2  6. Sitting : 2        Raw Score  HOOS Jr. Raw Score: 14      PROMIS Questions    In general, would you say your health is:: 4    In general, would you say your quality of life is:: 3    In general, how would you rate your physical health?: 3    In general, how would you rate your mental health, including your mood and your ability to think?: 4    To what extent are you able to carry out your everyday physical activities such as walking, climbing stairs, carrying groceries, or moving a chair?: 3    In the past 7 days how often have you been bothered by emotional problems such as feeling anxious, depressed or irritable?: 4    In the past 7 days how would you rate your fatigue on average?: 3    In the past 7 days how would you rate your pain on average?: 7    In general, please rate how well you carry out your usual social activities and roles. (This includes activities at home, at work and in your community, and responsibilities as a parent, child, spouse, employee, friend, etc.): 3    In general, how would you rate your satisfaction with your social activities and relationships?: 4    How comfortable are you filling out medical forms by yourself?: 4    What amount of pain have you experienced in the last week in your other knee/hip?: 0    My BACK PAIN at the moment is: 0    Have you taken chronic narcotics in the last 90 days?: 0      Mode of Collection:

## 2025-01-09 NOTE — PERIOP NOTE
Ortho Discharge Planning form placed in folder at .     PROMIS AND HOOS COMPLETED, ENTERED INTO EPIC AND PLACED IN FOLDER AT .     CALLED FOR CARDIAC NOTES.  CARDIAC NOTES RECEIVED AND PLACED ON CHART.   EKG NOT CONFIRMED.  EKG REPEATED IN PAT.

## 2025-01-10 LAB
BACTERIA SPEC CULT: NORMAL
BACTERIA SPEC CULT: NORMAL
SERVICE CMNT-IMP: NORMAL

## 2025-01-13 PROBLEM — Z01.818 ENCOUNTER FOR PREADMISSION TESTING: Status: ACTIVE | Noted: 2025-01-13

## 2025-01-13 NOTE — PERIOP NOTE
97 - 108 mmol/L Final    CO2 01/09/2025 28  21 - 32 mmol/L Final    Anion Gap 01/09/2025 7  2 - 12 mmol/L Final    PLEASE NOTE NEW REFERENCE RANGE    Glucose 01/09/2025 82  65 - 100 mg/dL Final    BUN 01/09/2025 20  6 - 20 MG/DL Final    Creatinine 01/09/2025 0.68  0.55 - 1.02 MG/DL Final    BUN/Creatinine Ratio 01/09/2025 29 (H)  12 - 20   Final    Est, Glom Filt Rate 01/09/2025 89  >60 ml/min/1.73m2 Final    Comment:    Pediatric calculator link: https://www.kidney.org/professionals/kdoqi/gfr_calculatorped     These results are not intended for use in patients <18 years of age.     eGFR results are calculated without a race factor using  the 2021 CKD-EPI equation. Careful clinical correlation is recommended, particularly when comparing to results calculated using previous equations.  The CKD-EPI equation is less accurate in patients with extremes of muscle mass, extra-renal metabolism of creatinine, excessive creatine ingestion, or following therapy that affects renal tubular secretion.      Calcium 01/09/2025 9.2  8.5 - 10.1 MG/DL Final    WBC 01/09/2025 6.2  3.6 - 11.0 K/uL Final    RBC 01/09/2025 4.39  3.80 - 5.20 M/uL Final    Hemoglobin 01/09/2025 13.2  11.5 - 16.0 g/dL Final    Hematocrit 01/09/2025 40.4  35.0 - 47.0 % Final    MCV 01/09/2025 92.0  80.0 - 99.0 FL Final    MCH 01/09/2025 30.1  26.0 - 34.0 PG Final    MCHC 01/09/2025 32.7  30.0 - 36.5 g/dL Final    RDW 01/09/2025 13.0  11.5 - 14.5 % Final    Platelets 01/09/2025 230  150 - 400 K/uL Final    MPV 01/09/2025 9.6  8.9 - 12.9 FL Final    Nucleated RBCs 01/09/2025 0.0  0  WBC Final    nRBC 01/09/2025 0.00  0.00 - 0.01 K/uL Final    Special Requests 01/09/2025 NO SPECIAL REQUESTS    Final    Culture 01/09/2025 MRSA NOT PRESENT    Final    Culture 01/09/2025     Final                    Value:Screening of patient nares for MRSA is for surveillance purposes and, if positive, to facilitate isolation considerations in high risk settings. It is

## 2025-01-17 NOTE — DISCHARGE INSTRUCTIONS
Post-op Discharge Instructions Following Total Joint Replacement  Keenan Baum MD  [facility]  [phone]  Follow-up Office Visit  See Dr. Baum approximately 3-4 weeks from date of surgery. Call [phone] to make an appointment.  If you have any postop questions for Dr. Baum's clinical team, please call [phone].  Activity  Use your walker for ambulation.  Weight bearing as tolerated unless instructed otherwise by the physical therapist. Get up every hour you are awake and take a brief walk. Lengthen walking distance daily as your strength improves.  Continue using your walker until seen in the office for your first follow up visit.  Practice your exercises 3 times daily as instructed by the physical therapist. Ice for 20 minutes after exercising.  No driving until seen in the office for your first follow up visit.  Incision Care  The surgical dressing is waterproof and is to remain on your incision for 7 days. On the 7th day, carefully lift the edge of the dressing to break the adhesive seal and gently peel it off.  If your surgical dressing comes loose or falls off before the 7th day, replace it with a dry sterile gauze dressing and change this dressing daily. Once there is no drainage on the bandage, you mean leave the incision open to air.  You may take a shower with the surgical dressing in place. After you remove the surgical dressing on day 7, you may continue to shower and get your incision wet in the shower. Do not submerge your incision under water in a bathtub, hot tub, swimming pool, etc. until after you have been evaluated at your first office visit.  Medications  Blood Clot Prevention: Take medication as prescribed by your physician for 4 weeks postop.  Pain Management: Take pain medication as prescribed; wean yourself off of pain medication as your pain lessens. Take with food.  You make also take Tylenol every 4-6 hours as needed for pain.  Do not exceed 3 grams (3000mg) per

## 2025-01-20 ENCOUNTER — ANESTHESIA EVENT (OUTPATIENT)
Facility: HOSPITAL | Age: 80
End: 2025-01-20
Payer: MEDICARE

## 2025-01-20 RX ORDER — ONDANSETRON 2 MG/ML
4 INJECTION INTRAMUSCULAR; INTRAVENOUS
Status: CANCELLED | OUTPATIENT
Start: 2025-01-20 | End: 2025-01-21

## 2025-01-20 RX ORDER — SODIUM CHLORIDE 9 MG/ML
INJECTION, SOLUTION INTRAVENOUS PRN
Status: CANCELLED | OUTPATIENT
Start: 2025-01-20

## 2025-01-20 RX ORDER — NALOXONE HYDROCHLORIDE 0.4 MG/ML
INJECTION, SOLUTION INTRAMUSCULAR; INTRAVENOUS; SUBCUTANEOUS PRN
Status: CANCELLED | OUTPATIENT
Start: 2025-01-20

## 2025-01-20 RX ORDER — SODIUM CHLORIDE 0.9 % (FLUSH) 0.9 %
5-40 SYRINGE (ML) INJECTION EVERY 12 HOURS SCHEDULED
Status: CANCELLED | OUTPATIENT
Start: 2025-01-20

## 2025-01-20 RX ORDER — HYDROMORPHONE HYDROCHLORIDE 1 MG/ML
0.5 INJECTION, SOLUTION INTRAMUSCULAR; INTRAVENOUS; SUBCUTANEOUS EVERY 5 MIN PRN
Status: CANCELLED | OUTPATIENT
Start: 2025-01-20

## 2025-01-20 RX ORDER — HYDRALAZINE HYDROCHLORIDE 20 MG/ML
10 INJECTION INTRAMUSCULAR; INTRAVENOUS
Status: CANCELLED | OUTPATIENT
Start: 2025-01-20

## 2025-01-20 RX ORDER — LABETALOL HYDROCHLORIDE 5 MG/ML
10 INJECTION, SOLUTION INTRAVENOUS
Status: CANCELLED | OUTPATIENT
Start: 2025-01-20

## 2025-01-20 RX ORDER — OXYCODONE HYDROCHLORIDE 5 MG/1
5 TABLET ORAL
Status: CANCELLED | OUTPATIENT
Start: 2025-01-20 | End: 2025-01-21

## 2025-01-20 RX ORDER — PROCHLORPERAZINE EDISYLATE 5 MG/ML
5 INJECTION INTRAMUSCULAR; INTRAVENOUS
Status: CANCELLED | OUTPATIENT
Start: 2025-01-20 | End: 2025-01-21

## 2025-01-20 RX ORDER — SODIUM CHLORIDE 0.9 % (FLUSH) 0.9 %
5-40 SYRINGE (ML) INJECTION PRN
Status: CANCELLED | OUTPATIENT
Start: 2025-01-20

## 2025-01-20 RX ORDER — FENTANYL CITRATE 50 UG/ML
25 INJECTION, SOLUTION INTRAMUSCULAR; INTRAVENOUS EVERY 5 MIN PRN
Status: CANCELLED | OUTPATIENT
Start: 2025-01-20

## 2025-01-20 NOTE — H&P
VIEWS) 10/18/2024     Narrative  Single weightbearing AP pelvis x-ray demonstrate severe osteoarthritis of the right hip.  Complete loss of right hip joint space, significant osteophyte formation on both sides of the joint.  Surgical clips present in the left inguinal region.                 ASSESSMENT/PLAN:  1. Primary osteoarthritis of right hip  -     XR PELVIS (1-2 VIEWS); Future     Severe right hip osteoarthritis.     Poceed with right total hip replacement.  We discussed risks, benefits, and alternatives in detail, as well as anticipated hospital stay and course of rehabilitation.  All questions answered.            SG Baum MD

## 2025-01-21 ENCOUNTER — ANESTHESIA (OUTPATIENT)
Facility: HOSPITAL | Age: 80
End: 2025-01-21
Payer: MEDICARE

## 2025-01-21 ENCOUNTER — APPOINTMENT (OUTPATIENT)
Facility: HOSPITAL | Age: 80
End: 2025-01-21
Attending: ORTHOPAEDIC SURGERY
Payer: MEDICARE

## 2025-01-21 ENCOUNTER — HOSPITAL ENCOUNTER (OUTPATIENT)
Facility: HOSPITAL | Age: 80
Setting detail: OBSERVATION
Discharge: HOME | End: 2025-01-21
Attending: ORTHOPAEDIC SURGERY | Admitting: ORTHOPAEDIC SURGERY
Payer: MEDICARE

## 2025-01-21 VITALS
DIASTOLIC BLOOD PRESSURE: 80 MMHG | OXYGEN SATURATION: 100 % | HEART RATE: 72 BPM | SYSTOLIC BLOOD PRESSURE: 138 MMHG | RESPIRATION RATE: 18 BRPM | TEMPERATURE: 97.8 F

## 2025-01-21 LAB
GLUCOSE BLD STRIP.AUTO-MCNC: 89 MG/DL (ref 65–117)
SERVICE CMNT-IMP: NORMAL

## 2025-01-21 PROCEDURE — 6370000000 HC RX 637 (ALT 250 FOR IP): Performed by: PHYSICIAN ASSISTANT

## 2025-01-21 PROCEDURE — 2580000003 HC RX 258: Performed by: STUDENT IN AN ORGANIZED HEALTH CARE EDUCATION/TRAINING PROGRAM

## 2025-01-21 PROCEDURE — 82962 GLUCOSE BLOOD TEST: CPT

## 2025-01-21 PROCEDURE — G0378 HOSPITAL OBSERVATION PER HR: HCPCS

## 2025-01-21 RX ORDER — SODIUM CHLORIDE 0.9 % (FLUSH) 0.9 %
5-40 SYRINGE (ML) INJECTION PRN
Status: DISCONTINUED | OUTPATIENT
Start: 2025-01-21 | End: 2025-01-21 | Stop reason: HOSPADM

## 2025-01-21 RX ORDER — ACETAMINOPHEN 500 MG
1000 TABLET ORAL ONCE
Status: COMPLETED | OUTPATIENT
Start: 2025-01-21 | End: 2025-01-21

## 2025-01-21 RX ORDER — ACETAMINOPHEN 500 MG
1000 TABLET ORAL ONCE
Status: DISCONTINUED | OUTPATIENT
Start: 2025-01-21 | End: 2025-01-21 | Stop reason: SDUPTHER

## 2025-01-21 RX ORDER — LIDOCAINE HYDROCHLORIDE 10 MG/ML
1 INJECTION, SOLUTION EPIDURAL; INFILTRATION; INTRACAUDAL; PERINEURAL
Status: DISCONTINUED | OUTPATIENT
Start: 2025-01-21 | End: 2025-01-21 | Stop reason: HOSPADM

## 2025-01-21 RX ORDER — SODIUM CHLORIDE 0.9 % (FLUSH) 0.9 %
5-40 SYRINGE (ML) INJECTION EVERY 12 HOURS SCHEDULED
Status: DISCONTINUED | OUTPATIENT
Start: 2025-01-21 | End: 2025-01-21 | Stop reason: HOSPADM

## 2025-01-21 RX ORDER — FENTANYL CITRATE 50 UG/ML
100 INJECTION, SOLUTION INTRAMUSCULAR; INTRAVENOUS
Status: DISCONTINUED | OUTPATIENT
Start: 2025-01-21 | End: 2025-01-21 | Stop reason: HOSPADM

## 2025-01-21 RX ORDER — SODIUM CHLORIDE 9 MG/ML
INJECTION, SOLUTION INTRAVENOUS PRN
Status: DISCONTINUED | OUTPATIENT
Start: 2025-01-21 | End: 2025-01-21 | Stop reason: HOSPADM

## 2025-01-21 RX ORDER — CELECOXIB 200 MG/1
200 CAPSULE ORAL ONCE
Status: COMPLETED | OUTPATIENT
Start: 2025-01-21 | End: 2025-01-21

## 2025-01-21 RX ORDER — SODIUM CHLORIDE, SODIUM LACTATE, POTASSIUM CHLORIDE, CALCIUM CHLORIDE 600; 310; 30; 20 MG/100ML; MG/100ML; MG/100ML; MG/100ML
INJECTION, SOLUTION INTRAVENOUS CONTINUOUS
Status: DISCONTINUED | OUTPATIENT
Start: 2025-01-21 | End: 2025-01-21 | Stop reason: HOSPADM

## 2025-01-21 RX ORDER — MIDAZOLAM HYDROCHLORIDE 2 MG/2ML
2 INJECTION, SOLUTION INTRAMUSCULAR; INTRAVENOUS PRN
Status: DISCONTINUED | OUTPATIENT
Start: 2025-01-21 | End: 2025-01-21 | Stop reason: HOSPADM

## 2025-01-21 RX ADMIN — CELECOXIB 200 MG: 200 CAPSULE ORAL at 06:18

## 2025-01-21 RX ADMIN — ACETAMINOPHEN 1000 MG: 500 TABLET ORAL at 06:18

## 2025-01-21 RX ADMIN — SODIUM CHLORIDE, POTASSIUM CHLORIDE, SODIUM LACTATE AND CALCIUM CHLORIDE: 600; 310; 30; 20 INJECTION, SOLUTION INTRAVENOUS at 06:30

## 2025-01-21 ASSESSMENT — PAIN - FUNCTIONAL ASSESSMENT: PAIN_FUNCTIONAL_ASSESSMENT: NONE - DENIES PAIN

## 2025-01-21 NOTE — PROGRESS NOTES
Patient had honey in her coffee this morning.  Anesthesia team prefers to go over the case this morning.  From a logistical standpoint, this will cause delays in our overall schedule, also procedure will be rescheduled for later this week.

## 2025-01-21 NOTE — ANESTHESIA PRE PROCEDURE
Department of Anesthesiology  Preprocedure Note       Name:  Mary Omalley   Age:  79 y.o.  :  1945                                          MRN:  [id number]         Date:  2025      Surgeon: Surgeon(s):  Keenan Baum MD    Procedure: Procedure(s):  RIGHT ANTERIOR TOTAL HIP ARTHROPLASTY      ADDENDUM: Patient not appropriately NPO. Discussion with surgeon regarding urgency of procedure with resultant decision to cancel the elective case. Risks associated with inadequate NPO status and NPO guidelines discussed with patient, who expressed understanding and agreement with the plan. All questions answered.         Medications prior to admission:   Prior to Admission medications    Medication Sig Start Date End Date Taking? Authorizing Provider   vitamin D 25 MCG (1000 UT) CAPS Take 1 capsule by mouth Daily   Yes Maria Guadalupe Jensen MD   meloxicam (MOBIC) 15 MG tablet Take 1 tablet by mouth as needed for Pain   Yes Maria Guadalupe Jensen MD   acetaminophen (TYLENOL) 650 MG extended release tablet Take 1 tablet by mouth as needed for Pain   Yes ProviderMaria Guadalupe MD   Glycerin-Polysorbate 80 (REFRESH DRY EYE THERAPY OP) Apply 1 drop to eye daily   Yes ProviderMaria Guadalupe MD   Calcium-Magnesium-Vitamin D (CALCIUM 1200+D3 PO) Take 1 tablet by mouth daily   Yes ProviderMaria Guadalupe MD   aspirin 81 MG chewable tablet Take 1 tablet by mouth daily   Yes ProviderMaria Guadalupe MD   metoprolol succinate (TOPROL XL) 25 MG extended release tablet Take 1 tablet by mouth every morning 21  Yes Automatic Reconciliation, Ar   Calcium Carbonate-Vitamin D 600-3.125 MG-MCG TABS Take 1 tablet by mouth daily    Automatic Reconciliation, Ar       Current medications:    Current Facility-Administered Medications   Medication Dose Route Frequency Provider Last Rate Last Admin    tranexamic acid (CYKLOKAPRON) 1,000 mg in sodium chloride 0.9 % 110 mL IVPB (mini-bag)  1,000 mg IntraVENous On Call to OR Lorna Rosas

## 2025-01-23 NOTE — DISCHARGE INSTRUCTIONS
Post-op Discharge Instructions Following Total Joint Replacement  Keenan Baum MD  [facility]  [phone]  Follow-up Office Visit  See Dr. Baum approximately 3-4 weeks from date of surgery. Call [phone] to make an appointment.  If you have any postop questions for Dr. Baum's clinical team, please call [phone].  Activity  Use your walker for ambulation.  Weight bearing as tolerated unless instructed otherwise by the physical therapist. Get up every hour you are awake and take a brief walk. Lengthen walking distance daily as your strength improves.  Continue using your walker until seen in the office for your first follow up visit.  Practice your exercises 3 times daily as instructed by the physical therapist. Ice for 20 minutes after exercising.  No driving until seen in the office for your first follow up visit.  Incision Care  The surgical dressing is waterproof and is to remain on your incision for 7 days. On the 7th day, carefully lift the edge of the dressing to break the adhesive seal and gently peel it off.  If your surgical dressing comes loose or falls off before the 7th day, replace it with a dry sterile gauze dressing and change this dressing daily. Once there is no drainage on the bandage, you mean leave the incision open to air.  You may take a shower with the surgical dressing in place. After you remove the surgical dressing on day 7, you may continue to shower and get your incision wet in the shower. Do not submerge your incision under water in a bathtub, hot tub, swimming pool, etc. until after you have been evaluated at your first office visit.  Medications  Blood Clot Prevention: Take medication as prescribed by your physician for 4 weeks postop.  Pain Management: Take pain medication as prescribed; wean yourself off of pain medication as your pain lessens. Take with food.  You make also take Tylenol every 4-6 hours as needed for pain.  Do not exceed 3 grams (3000mg) per

## 2025-01-24 ENCOUNTER — ANESTHESIA (OUTPATIENT)
Facility: HOSPITAL | Age: 80
End: 2025-01-24
Payer: MEDICARE

## 2025-01-24 ENCOUNTER — ANESTHESIA EVENT (OUTPATIENT)
Facility: HOSPITAL | Age: 80
End: 2025-01-24
Payer: MEDICARE

## 2025-01-24 ENCOUNTER — APPOINTMENT (OUTPATIENT)
Facility: HOSPITAL | Age: 80
End: 2025-01-24
Attending: ORTHOPAEDIC SURGERY
Payer: MEDICARE

## 2025-01-24 ENCOUNTER — HOSPITAL ENCOUNTER (OUTPATIENT)
Facility: HOSPITAL | Age: 80
Setting detail: OBSERVATION
Discharge: HOME | End: 2025-01-25
Attending: ORTHOPAEDIC SURGERY | Admitting: ORTHOPAEDIC SURGERY
Payer: MEDICARE

## 2025-01-24 DIAGNOSIS — Z96.641 S/P TOTAL RIGHT HIP ARTHROPLASTY: Primary | ICD-10-CM

## 2025-01-24 LAB
ABO + RH BLD: NORMAL
BLOOD GROUP ANTIBODIES SERPL: NORMAL
GLUCOSE BLD STRIP.AUTO-MCNC: 82 MG/DL (ref 65–117)
SERVICE CMNT-IMP: NORMAL
SPECIMEN EXP DATE BLD: NORMAL

## 2025-01-24 PROCEDURE — 86901 BLOOD TYPING SEROLOGIC RH(D): CPT

## 2025-01-24 PROCEDURE — 6370000000 HC RX 637 (ALT 250 FOR IP): Performed by: ORTHOPAEDIC SURGERY

## 2025-01-24 PROCEDURE — 6370000000 HC RX 637 (ALT 250 FOR IP): Performed by: PHYSICIAN ASSISTANT

## 2025-01-24 PROCEDURE — 3600000015 HC SURGERY LEVEL 5 ADDTL 15MIN: Performed by: ORTHOPAEDIC SURGERY

## 2025-01-24 PROCEDURE — 7100000001 HC PACU RECOVERY - ADDTL 15 MIN: Performed by: ORTHOPAEDIC SURGERY

## 2025-01-24 PROCEDURE — 2500000003 HC RX 250 WO HCPCS: Performed by: PHYSICIAN ASSISTANT

## 2025-01-24 PROCEDURE — 6360000002 HC RX W HCPCS: Performed by: PHYSICIAN ASSISTANT

## 2025-01-24 PROCEDURE — 2580000003 HC RX 258: Performed by: PHYSICIAN ASSISTANT

## 2025-01-24 PROCEDURE — 86900 BLOOD TYPING SEROLOGIC ABO: CPT

## 2025-01-24 PROCEDURE — 2500000003 HC RX 250 WO HCPCS: Performed by: NURSE ANESTHETIST, CERTIFIED REGISTERED

## 2025-01-24 PROCEDURE — 3600000005 HC SURGERY LEVEL 5 BASE: Performed by: ORTHOPAEDIC SURGERY

## 2025-01-24 PROCEDURE — 2580000003 HC RX 258: Performed by: NURSE ANESTHETIST, CERTIFIED REGISTERED

## 2025-01-24 PROCEDURE — G0378 HOSPITAL OBSERVATION PER HR: HCPCS

## 2025-01-24 PROCEDURE — 2709999900 HC NON-CHARGEABLE SUPPLY: Performed by: ORTHOPAEDIC SURGERY

## 2025-01-24 PROCEDURE — 86850 RBC ANTIBODY SCREEN: CPT

## 2025-01-24 PROCEDURE — 6360000002 HC RX W HCPCS: Performed by: NURSE ANESTHETIST, CERTIFIED REGISTERED

## 2025-01-24 PROCEDURE — 3700000001 HC ADD 15 MINUTES (ANESTHESIA): Performed by: ORTHOPAEDIC SURGERY

## 2025-01-24 PROCEDURE — 27130 TOTAL HIP ARTHROPLASTY: CPT | Performed by: ORTHOPAEDIC SURGERY

## 2025-01-24 PROCEDURE — 6360000002 HC RX W HCPCS: Performed by: ANESTHESIOLOGY

## 2025-01-24 PROCEDURE — 7100000000 HC PACU RECOVERY - FIRST 15 MIN: Performed by: ORTHOPAEDIC SURGERY

## 2025-01-24 PROCEDURE — C1776 JOINT DEVICE (IMPLANTABLE): HCPCS | Performed by: ORTHOPAEDIC SURGERY

## 2025-01-24 PROCEDURE — 3700000000 HC ANESTHESIA ATTENDED CARE: Performed by: ORTHOPAEDIC SURGERY

## 2025-01-24 PROCEDURE — 36415 COLL VENOUS BLD VENIPUNCTURE: CPT

## 2025-01-24 PROCEDURE — 27130 TOTAL HIP ARTHROPLASTY: CPT | Performed by: PHYSICIAN ASSISTANT

## 2025-01-24 PROCEDURE — 82962 GLUCOSE BLOOD TEST: CPT

## 2025-01-24 PROCEDURE — 2580000003 HC RX 258: Performed by: ANESTHESIOLOGY

## 2025-01-24 DEVICE — HEAD, FEMORAL, CERAMIC, BILOX DELTA, 36MM NEUTRAL
Type: IMPLANTABLE DEVICE | Site: HIP | Status: FUNCTIONAL
Brand: DJO SURGICAL

## 2025-01-24 DEVICE — IMPL CAPPED HIP H2 TOTAL ADV OTHER HEAD DJO: Type: IMPLANTABLE DEVICE | Site: HIP | Status: FUNCTIONAL

## 2025-01-24 DEVICE — EMPOWR ACETABULAR SYSTEM, LINER, NEUTRAL, HXE+, 36G
Type: IMPLANTABLE DEVICE | Site: HIP | Status: FUNCTIONAL
Brand: DJO SURGICAL

## 2025-01-24 DEVICE — TAPERFILL HIP STEM, STANDARD, SIZE 11
Type: IMPLANTABLE DEVICE | Site: HIP | Status: FUNCTIONAL
Brand: DJO SURGICAL

## 2025-01-24 DEVICE — EMPOWR ACETABULAR, BONE SCREW, 30MM
Type: IMPLANTABLE DEVICE | Site: HIP | Status: FUNCTIONAL
Brand: DJO SURGICAL

## 2025-01-24 DEVICE — EMPOWR ACET SYSTEM, CUP, HEMISPHERICAL, CLUSTER HOLE, 54MM
Type: IMPLANTABLE DEVICE | Site: HIP | Status: FUNCTIONAL
Brand: DJO SURGICAL

## 2025-01-24 RX ORDER — OXYCODONE HYDROCHLORIDE 5 MG/1
2.5-5 TABLET ORAL EVERY 4 HOURS PRN
Qty: 42 TABLET | Refills: 0 | Status: SHIPPED | OUTPATIENT
Start: 2025-01-24 | End: 2025-01-31

## 2025-01-24 RX ORDER — FENTANYL CITRATE 50 UG/ML
100 INJECTION, SOLUTION INTRAMUSCULAR; INTRAVENOUS
Status: DISCONTINUED | OUTPATIENT
Start: 2025-01-24 | End: 2025-01-24 | Stop reason: HOSPADM

## 2025-01-24 RX ORDER — SODIUM CHLORIDE 9 MG/ML
INJECTION, SOLUTION INTRAVENOUS PRN
Status: DISCONTINUED | OUTPATIENT
Start: 2025-01-24 | End: 2025-01-24 | Stop reason: HOSPADM

## 2025-01-24 RX ORDER — SODIUM CHLORIDE 9 MG/ML
INJECTION, SOLUTION INTRAVENOUS PRN
Status: DISCONTINUED | OUTPATIENT
Start: 2025-01-24 | End: 2025-01-25 | Stop reason: HOSPADM

## 2025-01-24 RX ORDER — ACETAMINOPHEN 500 MG
500 TABLET ORAL EVERY 4 HOURS
Qty: 100 TABLET | Refills: 1 | Status: SHIPPED | OUTPATIENT
Start: 2025-01-24

## 2025-01-24 RX ORDER — ONDANSETRON 2 MG/ML
4 INJECTION INTRAMUSCULAR; INTRAVENOUS EVERY 6 HOURS PRN
Status: DISCONTINUED | OUTPATIENT
Start: 2025-01-24 | End: 2025-01-25 | Stop reason: HOSPADM

## 2025-01-24 RX ORDER — HYDROMORPHONE HYDROCHLORIDE 1 MG/ML
0.5 INJECTION, SOLUTION INTRAMUSCULAR; INTRAVENOUS; SUBCUTANEOUS EVERY 4 HOURS PRN
Status: DISCONTINUED | OUTPATIENT
Start: 2025-01-24 | End: 2025-01-25 | Stop reason: HOSPADM

## 2025-01-24 RX ORDER — SODIUM CHLORIDE 0.9 % (FLUSH) 0.9 %
5-40 SYRINGE (ML) INJECTION EVERY 12 HOURS SCHEDULED
Status: DISCONTINUED | OUTPATIENT
Start: 2025-01-24 | End: 2025-01-24 | Stop reason: HOSPADM

## 2025-01-24 RX ORDER — MIDAZOLAM HYDROCHLORIDE 2 MG/2ML
2 INJECTION, SOLUTION INTRAMUSCULAR; INTRAVENOUS PRN
Status: DISCONTINUED | OUTPATIENT
Start: 2025-01-24 | End: 2025-01-24 | Stop reason: HOSPADM

## 2025-01-24 RX ORDER — HYDROXYZINE HYDROCHLORIDE 10 MG/1
10 TABLET, FILM COATED ORAL EVERY 8 HOURS PRN
Status: DISCONTINUED | OUTPATIENT
Start: 2025-01-24 | End: 2025-01-25 | Stop reason: HOSPADM

## 2025-01-24 RX ORDER — 0.9 % SODIUM CHLORIDE 0.9 %
500 INTRAVENOUS SOLUTION INTRAVENOUS PRN
Status: DISCONTINUED | OUTPATIENT
Start: 2025-01-24 | End: 2025-01-25 | Stop reason: HOSPADM

## 2025-01-24 RX ORDER — SODIUM CHLORIDE, SODIUM LACTATE, POTASSIUM CHLORIDE, CALCIUM CHLORIDE 600; 310; 30; 20 MG/100ML; MG/100ML; MG/100ML; MG/100ML
INJECTION, SOLUTION INTRAVENOUS CONTINUOUS
Status: DISCONTINUED | OUTPATIENT
Start: 2025-01-24 | End: 2025-01-24 | Stop reason: HOSPADM

## 2025-01-24 RX ORDER — METOPROLOL SUCCINATE 25 MG/1
25 TABLET, EXTENDED RELEASE ORAL EVERY MORNING
Status: DISCONTINUED | OUTPATIENT
Start: 2025-01-25 | End: 2025-01-25 | Stop reason: HOSPADM

## 2025-01-24 RX ORDER — FENTANYL CITRATE 50 UG/ML
25 INJECTION, SOLUTION INTRAMUSCULAR; INTRAVENOUS EVERY 5 MIN PRN
Status: DISCONTINUED | OUTPATIENT
Start: 2025-01-24 | End: 2025-01-24 | Stop reason: HOSPADM

## 2025-01-24 RX ORDER — SODIUM CHLORIDE 0.9 % (FLUSH) 0.9 %
5-40 SYRINGE (ML) INJECTION EVERY 12 HOURS SCHEDULED
Status: DISCONTINUED | OUTPATIENT
Start: 2025-01-24 | End: 2025-01-25 | Stop reason: HOSPADM

## 2025-01-24 RX ORDER — KETOROLAC TROMETHAMINE 30 MG/ML
15 INJECTION, SOLUTION INTRAMUSCULAR; INTRAVENOUS EVERY 6 HOURS
Status: COMPLETED | OUTPATIENT
Start: 2025-01-24 | End: 2025-01-25

## 2025-01-24 RX ORDER — OXYCODONE HYDROCHLORIDE 5 MG/1
5 TABLET ORAL
Status: DISCONTINUED | OUTPATIENT
Start: 2025-01-24 | End: 2025-01-25 | Stop reason: HOSPADM

## 2025-01-24 RX ORDER — SODIUM CHLORIDE, SODIUM LACTATE, POTASSIUM CHLORIDE, CALCIUM CHLORIDE 600; 310; 30; 20 MG/100ML; MG/100ML; MG/100ML; MG/100ML
INJECTION, SOLUTION INTRAVENOUS
Status: DISCONTINUED | OUTPATIENT
Start: 2025-01-24 | End: 2025-01-24 | Stop reason: SDUPTHER

## 2025-01-24 RX ORDER — LIDOCAINE HYDROCHLORIDE 10 MG/ML
1 INJECTION, SOLUTION EPIDURAL; INFILTRATION; INTRACAUDAL; PERINEURAL
Status: DISCONTINUED | OUTPATIENT
Start: 2025-01-24 | End: 2025-01-24 | Stop reason: HOSPADM

## 2025-01-24 RX ORDER — OXYCODONE HYDROCHLORIDE 5 MG/1
2.5 TABLET ORAL
Status: DISCONTINUED | OUTPATIENT
Start: 2025-01-24 | End: 2025-01-25 | Stop reason: HOSPADM

## 2025-01-24 RX ORDER — SODIUM CHLORIDE 0.9 % (FLUSH) 0.9 %
5-40 SYRINGE (ML) INJECTION PRN
Status: DISCONTINUED | OUTPATIENT
Start: 2025-01-24 | End: 2025-01-24 | Stop reason: HOSPADM

## 2025-01-24 RX ORDER — DEXMEDETOMIDINE HYDROCHLORIDE 100 UG/ML
INJECTION, SOLUTION INTRAVENOUS
Status: DISCONTINUED | OUTPATIENT
Start: 2025-01-24 | End: 2025-01-24 | Stop reason: SDUPTHER

## 2025-01-24 RX ORDER — ONDANSETRON 2 MG/ML
INJECTION INTRAMUSCULAR; INTRAVENOUS
Status: DISCONTINUED | OUTPATIENT
Start: 2025-01-24 | End: 2025-01-24 | Stop reason: SDUPTHER

## 2025-01-24 RX ORDER — BISACODYL 10 MG
10 SUPPOSITORY, RECTAL RECTAL DAILY PRN
Status: DISCONTINUED | OUTPATIENT
Start: 2025-01-24 | End: 2025-01-25 | Stop reason: HOSPADM

## 2025-01-24 RX ORDER — CELECOXIB 200 MG/1
200 CAPSULE ORAL ONCE
Status: COMPLETED | OUTPATIENT
Start: 2025-01-24 | End: 2025-01-24

## 2025-01-24 RX ORDER — OXYCODONE HYDROCHLORIDE 5 MG/1
5 TABLET ORAL
Status: DISCONTINUED | OUTPATIENT
Start: 2025-01-24 | End: 2025-01-24 | Stop reason: HOSPADM

## 2025-01-24 RX ORDER — ACETAMINOPHEN 500 MG
1000 TABLET ORAL ONCE
Status: DISCONTINUED | OUTPATIENT
Start: 2025-01-24 | End: 2025-01-24 | Stop reason: SDUPTHER

## 2025-01-24 RX ORDER — SENNA AND DOCUSATE SODIUM 50; 8.6 MG/1; MG/1
1 TABLET, FILM COATED ORAL 2 TIMES DAILY
Status: DISCONTINUED | OUTPATIENT
Start: 2025-01-24 | End: 2025-01-25 | Stop reason: HOSPADM

## 2025-01-24 RX ORDER — ONDANSETRON 2 MG/ML
4 INJECTION INTRAMUSCULAR; INTRAVENOUS
Status: COMPLETED | OUTPATIENT
Start: 2025-01-24 | End: 2025-01-24

## 2025-01-24 RX ORDER — ONDANSETRON 4 MG/1
4 TABLET, ORALLY DISINTEGRATING ORAL EVERY 8 HOURS PRN
Status: DISCONTINUED | OUTPATIENT
Start: 2025-01-24 | End: 2025-01-25 | Stop reason: HOSPADM

## 2025-01-24 RX ORDER — POLYETHYLENE GLYCOL 3350 17 G/17G
17 POWDER, FOR SOLUTION ORAL DAILY PRN
Status: DISCONTINUED | OUTPATIENT
Start: 2025-01-24 | End: 2025-01-25 | Stop reason: HOSPADM

## 2025-01-24 RX ORDER — ACETAMINOPHEN 500 MG
1000 TABLET ORAL ONCE
Status: DISCONTINUED | OUTPATIENT
Start: 2025-01-24 | End: 2025-01-24 | Stop reason: HOSPADM

## 2025-01-24 RX ORDER — TRANEXAMIC ACID 100 MG/ML
INJECTION, SOLUTION INTRAVENOUS
Status: DISCONTINUED | OUTPATIENT
Start: 2025-01-24 | End: 2025-01-24 | Stop reason: SDUPTHER

## 2025-01-24 RX ORDER — HYDRALAZINE HYDROCHLORIDE 20 MG/ML
10 INJECTION INTRAMUSCULAR; INTRAVENOUS
Status: DISCONTINUED | OUTPATIENT
Start: 2025-01-24 | End: 2025-01-24 | Stop reason: HOSPADM

## 2025-01-24 RX ORDER — MIDAZOLAM HYDROCHLORIDE 1 MG/ML
INJECTION, SOLUTION INTRAMUSCULAR; INTRAVENOUS
Status: DISCONTINUED | OUTPATIENT
Start: 2025-01-24 | End: 2025-01-24 | Stop reason: SDUPTHER

## 2025-01-24 RX ORDER — SODIUM CHLORIDE 0.9 % (FLUSH) 0.9 %
5-40 SYRINGE (ML) INJECTION PRN
Status: DISCONTINUED | OUTPATIENT
Start: 2025-01-24 | End: 2025-01-25 | Stop reason: HOSPADM

## 2025-01-24 RX ORDER — ACETAMINOPHEN 500 MG
500 TABLET ORAL
Status: DISCONTINUED | OUTPATIENT
Start: 2025-01-24 | End: 2025-01-25 | Stop reason: HOSPADM

## 2025-01-24 RX ORDER — NALOXONE HYDROCHLORIDE 0.4 MG/ML
INJECTION, SOLUTION INTRAMUSCULAR; INTRAVENOUS; SUBCUTANEOUS PRN
Status: DISCONTINUED | OUTPATIENT
Start: 2025-01-24 | End: 2025-01-24 | Stop reason: HOSPADM

## 2025-01-24 RX ORDER — HYDROMORPHONE HYDROCHLORIDE 1 MG/ML
0.5 INJECTION, SOLUTION INTRAMUSCULAR; INTRAVENOUS; SUBCUTANEOUS EVERY 5 MIN PRN
Status: COMPLETED | OUTPATIENT
Start: 2025-01-24 | End: 2025-01-24

## 2025-01-24 RX ORDER — SENNA AND DOCUSATE SODIUM 50; 8.6 MG/1; MG/1
1 TABLET, FILM COATED ORAL 2 TIMES DAILY
Qty: 60 TABLET | Refills: 0 | Status: SHIPPED | OUTPATIENT
Start: 2025-01-24

## 2025-01-24 RX ORDER — ASPIRIN 81 MG/1
81 TABLET ORAL 2 TIMES DAILY
Status: DISCONTINUED | OUTPATIENT
Start: 2025-01-24 | End: 2025-01-25 | Stop reason: HOSPADM

## 2025-01-24 RX ORDER — PROCHLORPERAZINE EDISYLATE 5 MG/ML
5 INJECTION INTRAMUSCULAR; INTRAVENOUS
Status: DISCONTINUED | OUTPATIENT
Start: 2025-01-24 | End: 2025-01-24 | Stop reason: HOSPADM

## 2025-01-24 RX ORDER — SODIUM CHLORIDE 9 MG/ML
INJECTION, SOLUTION INTRAVENOUS CONTINUOUS
Status: DISPENSED | OUTPATIENT
Start: 2025-01-24 | End: 2025-01-25

## 2025-01-24 RX ORDER — ASPIRIN 81 MG/1
81 TABLET ORAL 2 TIMES DAILY
Qty: 60 TABLET | Refills: 0 | Status: SHIPPED | OUTPATIENT
Start: 2025-01-24 | End: 2025-02-23

## 2025-01-24 RX ADMIN — SODIUM CHLORIDE: 9 INJECTION, SOLUTION INTRAVENOUS at 16:08

## 2025-01-24 RX ADMIN — HYDROMORPHONE HYDROCHLORIDE 0.5 MG: 1 INJECTION, SOLUTION INTRAMUSCULAR; INTRAVENOUS; SUBCUTANEOUS at 15:50

## 2025-01-24 RX ADMIN — WATER 2000 MG: 1 INJECTION INTRAMUSCULAR; INTRAVENOUS; SUBCUTANEOUS at 22:40

## 2025-01-24 RX ADMIN — SODIUM CHLORIDE, POTASSIUM CHLORIDE, SODIUM LACTATE AND CALCIUM CHLORIDE: 600; 310; 30; 20 INJECTION, SOLUTION INTRAVENOUS at 15:28

## 2025-01-24 RX ADMIN — MIDAZOLAM 2 MG: 1 INJECTION INTRAMUSCULAR; INTRAVENOUS at 13:30

## 2025-01-24 RX ADMIN — MEPIVACAINE HYDROCHLORIDE 52.5 MG: 15 INJECTION, SOLUTION EPIDURAL; INFILTRATION at 13:40

## 2025-01-24 RX ADMIN — PHENYLEPHRINE HYDROCHLORIDE 30 MCG/MIN: 10 INJECTION INTRAVENOUS at 13:40

## 2025-01-24 RX ADMIN — PROPOFOL 20 MG: 10 INJECTION, EMULSION INTRAVENOUS at 13:59

## 2025-01-24 RX ADMIN — WATER 2000 MG: 1 INJECTION INTRAMUSCULAR; INTRAVENOUS; SUBCUTANEOUS at 13:50

## 2025-01-24 RX ADMIN — HYDROMORPHONE HYDROCHLORIDE 0.5 MG: 1 INJECTION, SOLUTION INTRAMUSCULAR; INTRAVENOUS; SUBCUTANEOUS at 16:41

## 2025-01-24 RX ADMIN — SODIUM CHLORIDE, POTASSIUM CHLORIDE, SODIUM LACTATE AND CALCIUM CHLORIDE: 600; 310; 30; 20 INJECTION, SOLUTION INTRAVENOUS at 13:30

## 2025-01-24 RX ADMIN — Medication 25 MG: at 14:22

## 2025-01-24 RX ADMIN — PROPOFOL 30 MG: 10 INJECTION, EMULSION INTRAVENOUS at 13:50

## 2025-01-24 RX ADMIN — ONDANSETRON 4 MG: 2 INJECTION INTRAMUSCULAR; INTRAVENOUS at 15:51

## 2025-01-24 RX ADMIN — ASPIRIN 81 MG: 81 TABLET, COATED ORAL at 22:39

## 2025-01-24 RX ADMIN — SENNOSIDES AND DOCUSATE SODIUM 1 TABLET: 50; 8.6 TABLET ORAL at 22:39

## 2025-01-24 RX ADMIN — DEXMEDETOMIDINE 10 MCG: 100 INJECTION, SOLUTION, CONCENTRATE INTRAVENOUS at 13:50

## 2025-01-24 RX ADMIN — FENTANYL CITRATE 25 MCG: 50 INJECTION INTRAMUSCULAR; INTRAVENOUS at 16:51

## 2025-01-24 RX ADMIN — ACETAMINOPHEN 500 MG: 500 TABLET ORAL at 22:39

## 2025-01-24 RX ADMIN — SODIUM CHLORIDE, PRESERVATIVE FREE 10 ML: 5 INJECTION INTRAVENOUS at 22:40

## 2025-01-24 RX ADMIN — DEXMEDETOMIDINE 10 MCG: 100 INJECTION, SOLUTION, CONCENTRATE INTRAVENOUS at 13:43

## 2025-01-24 RX ADMIN — ONDANSETRON 4 MG: 2 INJECTION INTRAMUSCULAR; INTRAVENOUS at 14:59

## 2025-01-24 RX ADMIN — KETOROLAC TROMETHAMINE 15 MG: 30 INJECTION, SOLUTION INTRAMUSCULAR at 22:39

## 2025-01-24 RX ADMIN — SODIUM CHLORIDE, POTASSIUM CHLORIDE, SODIUM LACTATE AND CALCIUM CHLORIDE: 600; 310; 30; 20 INJECTION, SOLUTION INTRAVENOUS at 11:08

## 2025-01-24 RX ADMIN — CELECOXIB 200 MG: 200 CAPSULE ORAL at 10:57

## 2025-01-24 RX ADMIN — PROPOFOL 60 MCG/KG/MIN: 10 INJECTION, EMULSION INTRAVENOUS at 13:44

## 2025-01-24 RX ADMIN — Medication 25 MG: at 13:59

## 2025-01-24 RX ADMIN — OXYCODONE HYDROCHLORIDE 5 MG: 5 TABLET ORAL at 19:57

## 2025-01-24 RX ADMIN — TRANEXAMIC ACID 1000 MG: 100 INJECTION, SOLUTION INTRAVENOUS at 13:50

## 2025-01-24 RX ADMIN — PROPOFOL 50 MG: 10 INJECTION, EMULSION INTRAVENOUS at 13:43

## 2025-01-24 RX ADMIN — ACETAMINOPHEN 500 MG: 500 TABLET ORAL at 18:07

## 2025-01-24 ASSESSMENT — PAIN DESCRIPTION - DESCRIPTORS
DESCRIPTORS: ACHING
DESCRIPTORS: ACHING;DISCOMFORT
DESCRIPTORS: ACHING;DISCOMFORT
DESCRIPTORS: BURNING
DESCRIPTORS: ACHING;DISCOMFORT

## 2025-01-24 ASSESSMENT — PAIN DESCRIPTION - LOCATION
LOCATION: HIP

## 2025-01-24 ASSESSMENT — PAIN DESCRIPTION - PAIN TYPE
TYPE: ACUTE PAIN;SURGICAL PAIN
TYPE: SURGICAL PAIN
TYPE: SURGICAL PAIN
TYPE: ACUTE PAIN;SURGICAL PAIN
TYPE: SURGICAL PAIN
TYPE: ACUTE PAIN;SURGICAL PAIN

## 2025-01-24 ASSESSMENT — PAIN DESCRIPTION - ORIENTATION
ORIENTATION: RIGHT

## 2025-01-24 ASSESSMENT — PAIN SCALES - GENERAL
PAINLEVEL_OUTOF10: 3
PAINLEVEL_OUTOF10: 5
PAINLEVEL_OUTOF10: 4
PAINLEVEL_OUTOF10: 0
PAINLEVEL_OUTOF10: 7
PAINLEVEL_OUTOF10: 4
PAINLEVEL_OUTOF10: 5
PAINLEVEL_OUTOF10: 0
PAINLEVEL_OUTOF10: 5
PAINLEVEL_OUTOF10: 6
PAINLEVEL_OUTOF10: 0
PAINLEVEL_OUTOF10: 0
PAINLEVEL_OUTOF10: 5

## 2025-01-24 ASSESSMENT — PAIN - FUNCTIONAL ASSESSMENT: PAIN_FUNCTIONAL_ASSESSMENT: 0-10

## 2025-01-24 NOTE — ANESTHESIA PRE PROCEDURE
Department of Anesthesiology  Preprocedure Note       Name:  Mary Omalley   Age:  79 y.o.  :  1945                                          MRN:  [id number]         Date:  2025      Surgeon: Surgeon(s):  Keenan Baum MD    Procedure: Procedure(s):  RIGHT ANTERIOR TOTAL HIP ARTHROPLASTY    Medications prior to admission:   Prior to Admission medications    Medication Sig Start Date End Date Taking? Authorizing Provider   vitamin D 25 MCG (1000 UT) CAPS Take 1 capsule by mouth Daily    Maria Guadalupe Jensen MD   meloxicam (MOBIC) 15 MG tablet Take 1 tablet by mouth as needed for Pain    Maria Guadalupe Jensen MD   acetaminophen (TYLENOL) 650 MG extended release tablet Take 1 tablet by mouth as needed for Pain    Maria Guadalupe Jensen MD   Glycerin-Polysorbate 80 (REFRESH DRY EYE THERAPY OP) Apply 1 drop to eye daily    Maria Guadalupe Jensen MD   Calcium-Magnesium-Vitamin D (CALCIUM 1200+D3 PO) Take 1 tablet by mouth daily    Maria Guadalupe Jensen MD   aspirin 81 MG chewable tablet Take 1 tablet by mouth daily    Maria Guadalupe Jensen MD   Calcium Carbonate-Vitamin D 600-3.125 MG-MCG TABS Take 1 tablet by mouth daily    Automatic Reconciliation, Ar   metoprolol succinate (TOPROL XL) 25 MG extended release tablet Take 1 tablet by mouth every morning 21   Automatic Reconciliation, Ar       Current medications:    No current facility-administered medications for this encounter.       Allergies:    Allergies   Allergen Reactions   • No Known Allergies        Problem List:    Patient Active Problem List   Diagnosis Code   • Heart rate fast R00.0   • Status post placement of implantable loop recorder Z95.818   • Paroxysmal atrial fibrillation (HCC) I48.0   • S/P ablation of atrial fibrillation Z98.890, Z86.79   • Irregular heartbeat I49.9   • A-fib (HCC) I48.91   • Primary osteoarthritis of right hip M16.11   • Encounter for preadmission testing Z01.818       Past Medical History:        Diagnosis Date   •

## 2025-01-24 NOTE — FLOWSHEET NOTE
01/24/25 1359   Family Communication    Relationship to Patient Spouse    Phone Number Be Omalley [phone]   Family/Significant Other Update Called   Delivery Origin Nurse   Message Disposition Family present - message delivered   Update Given Yes   Family Communication   Family Update Message Procedure started;Surgeon working

## 2025-01-24 NOTE — BRIEF OP NOTE
Brief Postoperative Note      Patient: Mary Omalley  YOB: 1945  MRN: [id number]    Date of Procedure: 1/24/2025    Pre-Op Diagnosis Codes:      * Primary osteoarthritis of right hip [M16.11]    Post-Op Diagnosis: Same       Procedure(s):  RIGHT ANTERIOR TOTAL HIP ARTHROPLASTY    Surgeon(s):  Keenan Baum MD    Assistant:  Surgical Assistant: Rafael Bejarano  Physician Assistant: Lorna Rosas PA-C    Anesthesia: Spinal    Estimated Blood Loss (mL): 200     Complications: None    Specimens:   * No specimens in log *    Implants:  Implant Name Type Inv. Item Serial No.  Lot No. LRB No. Used Action   CUP ACET CLUS HOLE G 54 MM W/ DOME HOLE PLUG P2 COAT EMPOWR - SNA  CUP ACET CLUS HOLE G 54 MM W/ DOME HOLE PLUG P2 COAT EMPOWR NA [employer]  [facility] [id number] Right 1 Implanted   LINER ACET NEUT G 36X54 MM HIP HXE+ VIT E POLYETH EMPOWR - SNA  LINER ACET NEUT G 36X54 MM HIP HXE+ VIT E POLYETH EMPOWR NA [employer] [facility] [id number] Right 1 Implanted   STEM FEM STD OFFSET 11  MM 37 MM HIP CMNTLS CLLRLSS TI - SNA  STEM FEM STD OFFSET 11  MM 37 MM HIP CMNTLS CLLRLSS TI [facility] [employer] [facility] [id number] Right 1 Implanted   HEAD FEM NEUT 36 MM HIP OFFSET FOR FMP SYS BIOLOX DELT CERM - SNA  HEAD FEM NEUT 36 MM HIP OFFSET FOR FMP SYS BIOLOX DELT CERM NA [facility] [employer] [facility] [id number] Right 1 Implanted         Drains: * No LDAs found *    Findings:  Infection Present At Time Of Surgery (PATOS) (choose all levels that have infection present):  No infection present  Other Findings: oa right hip    Electronically signed by Lorna Rosas PA-C on 1/24/2025 at 3:08 PM

## 2025-01-24 NOTE — ANESTHESIA PROCEDURE NOTES
Spinal Block    Patient location during procedure: OR  End time: 1/24/2025 1:40 PM  Reason for block: primary anesthetic  Staffing  Performed: resident/CRNA   Anesthesiologist: Ayaan Abreu Jr., MD  Resident/CRNA: Gabino Li APRN - CRNA  Performed by: Gabino Li APRN - CRNA  Authorized by: Ayaan Abreu Jr., MD    Spinal Block  Patient position: sitting  Prep: Betadine  Patient monitoring: cardiac monitor, continuous pulse ox, continuous capnometry, frequent blood pressure checks and oxygen  Approach: midline  Location: L3/L4  Provider prep: mask and sterile gloves  Local infiltration: lidocaine  Needle  Needle type: pencil-tip   Needle gauge: 24 G  Needle length: 3.5 in  Assessment  Sensory level: T8  Swirl obtained: Yes  CSF: clear  Attempts: 1  Hemodynamics: stable  Preanesthetic Checklist  Completed: patient identified, IV checked, site marked, risks and benefits discussed, surgical/procedural consents, equipment checked, pre-op evaluation, timeout performed, anesthesia consent given, oxygen available, monitors applied/VS acknowledged, fire risk safety assessment completed and verbalized and blood product R/B/A discussed and consented

## 2025-01-24 NOTE — PERIOP NOTE
TRANSFER - OUT REPORT:    Verbal report given to Mike (name) on Mary Omalley  being transferred to [facility] (unit) for routine post-op       Report consisted of patient’s Situation, Background, Assessment and   Recommendations(SBAR).     Time Pre op antibiotic given:1350  Anesthesia Stop time: 1531    Information from the following report(s) SBAR, OR Summary, Intake/Output, MAR, and Accordion was reviewed with the receiving nurse.    Opportunity for questions and clarification was provided.     Is the patient on 02? Yes       L/Min 2       Other     Is the patient on a monitor? No    Is the nurse transporting with the patient? No    Surgical Waiting Area notified of patient's transfer from PACU? Yes      Clothes and invisiline to floor with pt

## 2025-01-24 NOTE — ANESTHESIA POSTPROCEDURE EVALUATION
Post-Anesthesia Evaluation and Assessment    Patient: Mary Omalley MRN: [id number]  SSN: [id number]    YOB: 1945  Age: 79 y.o.  Sex: female      I have evaluated the patient and they are stable and ready for discharge from the PACU.     Cardiovascular Function/Vital Signs  Visit Vitals  BP (!) 95/50   Pulse 63   Temp 97.3 °F (36.3 °C) (Axillary)   Resp 18   Ht 1.626 m (5' 4\")   Wt 68.9 kg (152 lb)   SpO2 97%   BMI 26.09 kg/m²       Patient is status post Spinal anesthesia for Procedure(s):  RIGHT ANTERIOR TOTAL HIP ARTHROPLASTY.    Nausea/Vomiting: None    Postoperative hydration reviewed and adequate.    Pain:  Managed    Neurological Status:   At baseline    Mental Status, Level of Consciousness: Alert and  oriented to person, place, and time    Pulmonary Status:   Adequate oxygenation and airway patent    Complications related to anesthesia: None    Post-anesthesia assessment completed. No concerns    Signed By: Keenan Colon MD     January 24, 2025

## 2025-01-24 NOTE — INTERVAL H&P NOTE
Update History & Physical    The patient's History and Physical of January 21, 2025 was reviewed with the patient and I examined the patient. There was no change. The surgical site was confirmed by the patient and me.     Plan: The risks, benefits, expected outcome, and alternative to the recommended procedure have been discussed with the patient. Patient understands and wants to proceed with right total hip replacement.     Electronically signed by Keenan Baum MD on 1/24/2025 at 1:10 PM

## 2025-01-25 VITALS
TEMPERATURE: 98.6 F | BODY MASS INDEX: 25.95 KG/M2 | SYSTOLIC BLOOD PRESSURE: 116 MMHG | HEART RATE: 78 BPM | RESPIRATION RATE: 16 BRPM | HEIGHT: 64 IN | WEIGHT: 152 LBS | OXYGEN SATURATION: 100 % | DIASTOLIC BLOOD PRESSURE: 58 MMHG

## 2025-01-25 LAB
ANION GAP SERPL CALC-SCNC: 4 MMOL/L (ref 2–12)
BUN SERPL-MCNC: 13 MG/DL (ref 6–20)
BUN/CREAT SERPL: 22 (ref 12–20)
CALCIUM SERPL-MCNC: 8.3 MG/DL (ref 8.5–10.1)
CHLORIDE SERPL-SCNC: 101 MMOL/L (ref 97–108)
CO2 SERPL-SCNC: 24 MMOL/L (ref 21–32)
CREAT SERPL-MCNC: 0.59 MG/DL (ref 0.55–1.02)
GLUCOSE SERPL-MCNC: 105 MG/DL (ref 65–100)
HCT VFR BLD AUTO: 32.8 % (ref 35–47)
HGB BLD-MCNC: 10.5 G/DL (ref 11.5–16)
POTASSIUM SERPL-SCNC: 4.4 MMOL/L (ref 3.5–5.1)
SODIUM SERPL-SCNC: 129 MMOL/L (ref 136–145)

## 2025-01-25 PROCEDURE — 97530 THERAPEUTIC ACTIVITIES: CPT

## 2025-01-25 PROCEDURE — 80048 BASIC METABOLIC PNL TOTAL CA: CPT

## 2025-01-25 PROCEDURE — 2500000003 HC RX 250 WO HCPCS: Performed by: PHYSICIAN ASSISTANT

## 2025-01-25 PROCEDURE — 6370000000 HC RX 637 (ALT 250 FOR IP): Performed by: PHYSICIAN ASSISTANT

## 2025-01-25 PROCEDURE — 96374 THER/PROPH/DIAG INJ IV PUSH: CPT

## 2025-01-25 PROCEDURE — 2580000003 HC RX 258: Performed by: PHYSICIAN ASSISTANT

## 2025-01-25 PROCEDURE — 6360000002 HC RX W HCPCS: Performed by: PHYSICIAN ASSISTANT

## 2025-01-25 PROCEDURE — 85014 HEMATOCRIT: CPT

## 2025-01-25 PROCEDURE — G0378 HOSPITAL OBSERVATION PER HR: HCPCS

## 2025-01-25 PROCEDURE — 97535 SELF CARE MNGMENT TRAINING: CPT

## 2025-01-25 PROCEDURE — 97165 OT EVAL LOW COMPLEX 30 MIN: CPT

## 2025-01-25 PROCEDURE — 97161 PT EVAL LOW COMPLEX 20 MIN: CPT

## 2025-01-25 PROCEDURE — 96376 TX/PRO/DX INJ SAME DRUG ADON: CPT

## 2025-01-25 PROCEDURE — 85018 HEMOGLOBIN: CPT

## 2025-01-25 PROCEDURE — 97116 GAIT TRAINING THERAPY: CPT

## 2025-01-25 RX ADMIN — ASPIRIN 81 MG: 81 TABLET, COATED ORAL at 08:39

## 2025-01-25 RX ADMIN — OXYCODONE HYDROCHLORIDE 5 MG: 5 TABLET ORAL at 06:18

## 2025-01-25 RX ADMIN — SODIUM CHLORIDE 500 ML: 9 INJECTION, SOLUTION INTRAVENOUS at 08:38

## 2025-01-25 RX ADMIN — OXYCODONE HYDROCHLORIDE 5 MG: 5 TABLET ORAL at 14:48

## 2025-01-25 RX ADMIN — KETOROLAC TROMETHAMINE 15 MG: 30 INJECTION, SOLUTION INTRAMUSCULAR at 08:40

## 2025-01-25 RX ADMIN — SENNOSIDES AND DOCUSATE SODIUM 1 TABLET: 50; 8.6 TABLET ORAL at 08:39

## 2025-01-25 RX ADMIN — ACETAMINOPHEN 500 MG: 500 TABLET ORAL at 06:13

## 2025-01-25 RX ADMIN — SODIUM CHLORIDE, PRESERVATIVE FREE 10 ML: 5 INJECTION INTRAVENOUS at 06:16

## 2025-01-25 RX ADMIN — ACETAMINOPHEN 500 MG: 500 TABLET ORAL at 11:23

## 2025-01-25 RX ADMIN — WATER 2000 MG: 1 INJECTION INTRAMUSCULAR; INTRAVENOUS; SUBCUTANEOUS at 06:13

## 2025-01-25 RX ADMIN — KETOROLAC TROMETHAMINE 15 MG: 30 INJECTION, SOLUTION INTRAMUSCULAR at 14:49

## 2025-01-25 RX ADMIN — SODIUM CHLORIDE, PRESERVATIVE FREE 10 ML: 5 INJECTION INTRAVENOUS at 08:38

## 2025-01-25 RX ADMIN — KETOROLAC TROMETHAMINE 15 MG: 30 INJECTION, SOLUTION INTRAMUSCULAR at 02:47

## 2025-01-25 ASSESSMENT — PAIN DESCRIPTION - DESCRIPTORS
DESCRIPTORS: ACHING
DESCRIPTORS: ACHING
DESCRIPTORS: SORE;ACHING
DESCRIPTORS: ACHING

## 2025-01-25 ASSESSMENT — PAIN DESCRIPTION - ORIENTATION
ORIENTATION: RIGHT

## 2025-01-25 ASSESSMENT — PAIN SCALES - GENERAL
PAINLEVEL_OUTOF10: 8
PAINLEVEL_OUTOF10: 4
PAINLEVEL_OUTOF10: 5
PAINLEVEL_OUTOF10: 5
PAINLEVEL_OUTOF10: 7
PAINLEVEL_OUTOF10: 4

## 2025-01-25 ASSESSMENT — PAIN DESCRIPTION - LOCATION
LOCATION: HIP

## 2025-01-25 NOTE — PLAN OF CARE
Problem: Pain  Goal: Verbalizes/displays adequate comfort level or baseline comfort level  1/25/2025 0510 by Jenny Jimenez LPN  Outcome: Progressing  Flowsheets  Taken 1/24/2025 2239 by Jenny Jimenez LPN  Verbalizes/displays adequate comfort level or baseline comfort level: Assess pain using appropriate pain scale  Taken 1/24/2025 1945 by Stephanie Gooden RN  Verbalizes/displays adequate comfort level or baseline comfort level: Assess pain using appropriate pain scale  Taken 1/24/2025 1845 by Stephanie Gooden RN  Verbalizes/displays adequate comfort level or baseline comfort level: Assess pain using appropriate pain scale  1/24/2025 1802 by Stephanie Gooden RN  Outcome: Progressing  Flowsheets  Taken 1/24/2025 1800  Verbalizes/displays adequate comfort level or baseline comfort level: Assess pain using appropriate pain scale  Taken 1/24/2025 1715  Verbalizes/displays adequate comfort level or baseline comfort level: Assess pain using appropriate pain scale     Problem: Safety - Adult  Goal: Free from fall injury  1/25/2025 0510 by Jenny Jimenez LPN  Outcome: Progressing  1/24/2025 1802 by Stephanie Gooden RN  Outcome: Progressing  Flowsheets (Taken 1/24/2025 1715)  Free From Fall Injury: Instruct family/caregiver on patient safety     Problem: Discharge Planning  Goal: Discharge to home or other facility with appropriate resources  1/25/2025 0510 by Jenny Jimenez LPN  Outcome: Progressing  Flowsheets (Taken 1/24/2025 2239)  Discharge to home or other facility with appropriate resources: Identify barriers to discharge with patient and caregiver  1/24/2025 1802 by Stephanie Gooden RN  Outcome: Progressing  Flowsheets (Taken 1/24/2025 1715)  Discharge to home or other facility with appropriate resources: Identify barriers to discharge with patient and caregiver

## 2025-01-25 NOTE — PLAN OF CARE
Problem: Physical Therapy - Adult  Goal: By Discharge: Performs mobility at highest level of function for planned discharge setting.  See evaluation for individualized goals.  Description: FUNCTIONAL STATUS PRIOR TO ADMISSION: Patient was independent and active without use of DME.    HOME SUPPORT PRIOR TO ADMISSION: The patient lived with supportive .    Physical Therapy Goals  Initiated 1/25/2025  1.  Patient will move from supine to sit and sit to supine, scoot up and down, and roll side to side in bed with modified independence within 4 day(s).    2.  Patient will perform sit to stand with modified independence within 4 day(s).  3.  Patient will transfer from bed to chair and chair to bed with modified independence using the least restrictive device within 4 day(s).  4.  Patient will ambulate with modified independence for 200 feet with the least restrictive device within 4 day(s).   5.  Patient will ascend/descend 4 stairs with single handrail + SPC with modified independence within 4 day(s).  6.  Patient will perform R LE home exercise program per protocol with modified independence within 4 days.  7. Patient will demonstrate understanding/maintain R LE weight bearing status during functional mobility within 4 days.      Outcome: Progressing     PHYSICAL THERAPY EVALUATION    Patient: Mary Omalley (79 y.o. female)  Date: 1/25/2025  Primary Diagnosis: Primary osteoarthritis of right hip [M16.11]  Procedure(s) (LRB):  RIGHT ANTERIOR TOTAL HIP ARTHROPLASTY (Right) 1 Day Post-Op   Precautions: R LE - WBAT      ASSESSMENT :   DEFICITS/IMPAIRMENTS:   The patient is limited by decreased functional mobility, ROM, strength, increased pain levels, and hypotension following admission for R ALEX, now POD # 1.       Patient was unable to participate in AM evaluation secondary to pain management issues and hypotension; much improved following fluid bolus. Patient participated in bed mobility, functional transfers  discussed with: occupational therapist and registered nurse    Patient Education  Education Given To: Patient;Family  Education Provided: Role of Therapy;Transfer Training;Mobility Training;Precautions;Fall Prevention Strategies;Home Exercise Program  Education Method: Teach Back  Barriers to Learning: None  Education Outcome: Verbalized understanding;Demonstrated understanding    Thank you for this referral.  Mary rGace Mcknight, PT, DPT  Minutes: 48

## 2025-01-25 NOTE — PROGRESS NOTES
OCCUPATIONAL THERAPY EVALUATION/DISCHARGE  Patient: Mary Omalley (79 y.o. female)  Date: 1/25/2025  Primary Diagnosis: Primary osteoarthritis of right hip [M16.11]  Procedure(s) (LRB):  RIGHT ANTERIOR TOTAL HIP ARTHROPLASTY (Right) 1 Day Post-Op     Precautions:                    ASSESSMENT :  Based on the objective data below, the patient is A&Ox4, overall good pain control and improved BP from this morning. She tolerated bed mobility, bathroom mobility, and LB dressing education with SBA to Set/up. The patient benefited from AE education and use, progressing from min to mod A for LB dressing to set/up by end of session. Do not anticipate any follow up OT needs at [facility], pt has good home support and needed DME. [facility] acute care OT.     Functional Outcome Measure:  The patient scored 22/24 on the AM PAC outcome measure which is indicative of min A for self-care.      Further skilled acute occupational therapy is not indicated at this time.     PLAN :  Recommend with staff: OOB in chair, bathroom for toileting     Recommendation for discharge: (in order for the patient to meet his/her long term goals):   No skilled occupational therapy    Other factors to consider for discharge: no additional factors    IF patient discharges home will need the following DME: patient owns DME required for discharge     SUBJECTIVE:   Patient stated, “Let's get up and see how the pain is.”    OBJECTIVE DATA SUMMARY:     Past Medical History:   Diagnosis Date    A-fib (HCC)     Ill-defined condition     varicose vein    Long term current use of anticoagulant therapy     STOPPED 2022    Varicose vein of leg      Past Surgical History:   Procedure Laterality Date    ABLATION OF DYSRHYTHMIC FOCUS      APPENDECTOMY      BREAST BIOPSY Left     benign    BUNIONECTOMY Right 2017    CARDIAC CATHETERIZATION  09/2018    A-ablation    CARDIAC SURGERY  05/11/2022    CAROLINE FLX LEFT APPENDAGE CLOSURE DEVICE    COLONOSCOPY N/A 7/11/2017    COLONOSCOPY  then sit to doff clothing off from knees to feet to facilitate fall prevention, pain management, and energy conservation with Supervision. AE for LB dressing provided.     Home safety: Patient instructed on home modifications and safety (raise height of ADL objects, appropriate height of chair surfaces, recliner safety, change of floor surfaces, clear pathways) to increase independence and fall prevention.  Patient indicated understanding.     Standing: Patient instructed and demonstrated to walk up to sink/countertop/surfaces, step into walker to increase safety of joint and fall prevention with Supervision. Instructed to apply concept of hip contraindications to ADLs within the home (no extreme reaching across body to right side, square off while using objects, slide objects along surfaces).  Patient instructed to increase amount of time standing, observe standing position during ADLs to increase even weight bearing through bilateral LEs to increase independence with ADLs.  Goal to be reached 30 days post - op, per orthopedic surgeon or per PT.  Patient indicated understanding.     Tub transfer: Patient instructed regarding when it is safe to begin transfer into tub (complete stairs with PT, advance exercises with PT high enough to clear tub height).  Patient instructed to use the same technique as used with stairs when entering and exiting tub (\"up with the non-surgical, down with the surgical leg\").        The patient was educated pt on stepping laterally over tub with non-operative LE first holding onto wall or grab bar and stepping out with operative leg with same technique. A shower chair was recommended for pt to sit on during showering.   Patient indicated understanding.    Transfer Training  Transfer Training: Yes  Sit to Stand: Stand-by assistance;Supervision  Stand to Sit: Stand-by assistance;Supervision  Bed to Chair: Supervision  Toilet Transfer: Supervision      [facility] AM-PACTM \"6 Clicks\"

## 2025-01-25 NOTE — CARE COORDINATION
01/25/25 1011   Readmission Assessment   Number of Days since last admission?   (NA)   Previous Disposition Other (comment)  (NA)   Who is being Interviewed   (NA)   What was the patient's/caregiver's perception as to why they think they needed to return back to the hospital?   (NA)   Did you visit your Primary Care Physician after you left the hospital, before you returned this time?   (NA)   Did you see a specialist, such as Cardiac, Pulmonary, Orthopedic Physician, etc. after you left the hospital?   (NA)   Who advised the patient to return to the hospital? Other (Comment)  (NA)   Does the patient report anything that got in the way of taking their medications?   (NA)   In our efforts to provide the best possible care to you and others like you, can you think of anything that we could have done to help you after you left the hospital the first time, so that you might not have needed to return so soon? Other (Comment)  (NA)     NOT a readmission    Chelita Blas RN, BSN, CM

## 2025-01-25 NOTE — PROGRESS NOTES
POD 1 Day Post-Op    Procedure:  Procedure(s):  RIGHT ANTERIOR TOTAL HIP ARTHROPLASTY    Subjective:     Patient seen and examined this morning.  She is doing well and pain is well-controlled with oral pain medicine.  She did have an episode of hypotension this morning so has yet to work with physical therapy.  Her metoprolol was held and she was given 500 cc bolus and she is feeling much better now.  Blood pressure is stabilized.    Objective:     Blood pressure (!) 117/54, pulse 75, temperature 99 °F (37.2 °C), temperature source Oral, resp. rate 16, height 1.626 m (5' 4\"), weight 68.9 kg (152 lb), SpO2 100%.    Temp (24hrs), Av.6 °F (36.4 °C), Min:97.3 °F (36.3 °C), Max:99 °F (37.2 °C)      Physical Exam:  Examination of the right hip reveals that the dressing is clean and intact. Sensation is intact to light touch.  Motor 5/5 with hip flexion, knee extension, plantarflexion, dorsiflexion and EHL.  DP 2+.  Calf supple and nontender.  Negative Homans' sign.  Brisk capillary refill.     Labs:   Lab Results   Component Value Date/Time    HGB 10.5 2025 02:02 AM    INR 1.0 2025 11:03 AM         Assessment:     Patient is a 79-year-old female now POD 1 s/p right ALEX    Plan/Recommendations/Medical Decision Making:     PT/OT this morning now that BP is stabilized.  Continue current pain regimen seems to be working.  DVT prophylaxis: Aspirin 81 mg twice daily  Patient will be discharged pending physical therapy clearance.  Likely today versus tomorrow.  --    Adan Reno, DO  Orthopaedic Surgery

## 2025-01-25 NOTE — PROGRESS NOTES
Physical Therapy  1/25/2025    Patient's orders received and chart reviewed. The patient is hypotensive this morning and receiving a saline bolus. Will defer skilled PT evaluation this morning and follow up later today pending blood pressure management.    Thank you  Mary Grace Mcknight, PT, DPT

## 2025-01-25 NOTE — PLAN OF CARE
Problem: Safety - Adult  Goal: Free from fall injury  1/25/2025 1345 by David Nieves, RN  Outcome: Adequate for Discharge     Problem: Discharge Planning  Goal: Discharge to home or other facility with appropriate resources  1/25/2025 1345 by David Nieves, RN  Outcome: Progressing  Flowsheets (Taken 1/25/2025 0844)  Discharge to home or other facility with appropriate resources:   Identify barriers to discharge with patient and caregiver   Identify discharge learning needs (meds, wound care, etc)     Problem: Pain  Goal: Verbalizes/displays adequate comfort level or baseline comfort level  1/25/2025 1345 by David Nieves, RN  Outcome: Progressing

## 2025-01-25 NOTE — PROGRESS NOTES
Patient's orders received and chart reviewed. The patient is hypotensive this morning, will defer this morning and follow up later today pending blood pressure.    Thank you  Rox Head MS OTR/L

## 2025-01-25 NOTE — PROGRESS NOTES
Patient assessed for readiness to ambulate.   Patient ambulated with assistance of 1 nurses. Patient ambulated with gait belt and walker. Patient walked to standard walker . Patient returned safely to bed.     Vitals:    01/24/25 1945   BP: (!) 96/46   Pulse: 71   Resp: 18   Temp: 97.3 °F (36.3 °C)   SpO2: 100%

## 2025-01-27 NOTE — OP NOTE
[address]  [address]                            OPERATIVE REPORT      PATIENT NAME: LUIS MANUEL MORENO               : 1945  MED REC NO: [id number]                       ROOM: [facility]  ACCOUNT NO: [id number]                       ADMIT DATE: 2025  PROVIDER: Keenan Baum MD    DATE OF SERVICE:  2025    PREOPERATIVE DIAGNOSES:  Osteoarthritis, right hip.    POSTOPERATIVE DIAGNOSES:  Osteoarthritis, right hip.    PROCEDURES PERFORMED:  Right total hip arthroplasty.    SURGEON:  Keenan Baum MD    ASSISTANT:  First assistant:  Lorna Rosas PA-C.    ANESTHESIA:  Spinal with sedation.    ESTIMATED BLOOD LOSS:  200 mL.    SPECIMENS REMOVED:  None.     COMPLICATIONS:  None.    IMPLANTS:  DonJoy 54 mm Empowr acetabular shell with 1 cancellous screw and 36 mm inside diameter neutral polyethylene liner, size 11 standard offset TaperFill femoral stem with 36 mm +0 ceramic femoral head.    INDICATIONS:  The patient is a 79-year-old female with progressive right hip and groin pain due to severe osteoarthritis.  Her symptoms have progressed despite comprehensive conservative treatment.  She presents for right total hip replacement.  Risks, benefits, alternatives of procedure were reviewed with her in detail and she desires to proceed.    DESCRIPTION OF PROCEDURE:  The patient was taken to the operating room where anesthesia team placed a spinal.  Preoperative IV antibiotics were administered.  She was placed supine on the [place] fracture table.  Right hip and thigh were prepped and draped in usual sterile fashion.  Through a longitudinal incision over tensor fascia tiffany muscle, tensor fascia was incised in line with the fibers.  Muscle belly was retracted laterally.  Ascending branches of lateral circumflex vessels were identified and coagulated.  Anterior capsulotomy was performed.  The femoral neck was osteotomized.  Head was removed with a

## 2025-03-04 NOTE — CARE COORDINATION
Care Management Initial Assessment       RUR: NA  Readmission? No  1st IM letter given? Bahena provided 1/25/25  1st  letter given: NA    CM met with the patient at the bedside, explained role. Patient states she already has DME for DC and her  or daughter will transport upon DC. Patient also states [facility] care has already contacted her to follow for Home health. CM sent referral on careport.      01/25/25 1005   Service Assessment   Patient Orientation Alert and Oriented;Person;Place;Situation;Self   Cognition Alert   History Provided By Patient   Primary Caregiver Self   Support Systems Children;Spouse/Significant Other   Patient's Healthcare Decision Maker is: Legal Next of Kin   PCP Verified by CM Yes  (last saw PA in the practice in December)   Last Visit to PCP Within last 3 months   Prior Functional Level Independent in ADLs/IADLs   Current Functional Level Assistance with the following:;Toileting;Dressing;Mobility;Bathing   Can patient return to prior living arrangement Yes   Ability to make needs known: Good   Family able to assist with home care needs: Yes   Would you like for me to discuss the discharge plan with any other family members/significant others, and if so, who? Yes  (upon patient's request)   Financial Resources Medicare   Social/Functional History   Lives With Significant other   Type of Home House   Home Layout One level   Bathroom Equipment Commode   Home Equipment Walker - Rolling;Cane   Prior Level of Assist for ADLs Independent   Prior Level of Assist for Homemaking Independent   Homemaking Responsibilities Yes   Ambulation Assistance Independent   Prior Level of Assist for Transfers Independent   Active  Yes   Discharge Planning   Type of Residence House   Living Arrangements Spouse/Significant Other   Potential Assistance Needed Home Care  (home health for PT)   DME Ordered? No   Potential Assistance Purchasing Medications No   Patient expects to be discharged to:  Patient acknowledges and states understanding of plan   Patient to take medication daily as prescribed  Check blood pressure at home   Add exercise and watch diet   -provided with diet information for DASH diet   Patient to increase fluid intake   Patient to limit Advil, Aleve, and Ibuprofen intake   Follow up in 4-6 weeks   Fasting labs ordered today

## (undated) DEVICE — AIRLIFE™ U/CONNECT-IT OXYGEN TUBING 7 FEET (2.1 M) CRUSH-RESISTANT OXYGEN TUBING, VINYL TIPPED: Brand: AIRLIFE™

## (undated) DEVICE — HANDPIECE SET WITH BONE CLEANING TIP AND SUCTION TUBE: Brand: INTERPULSE

## (undated) DEVICE — MARKER,SKIN,WI/RULER AND LABELS: Brand: MEDLINE

## (undated) DEVICE — ZIPPERED TOGA, LARGE: Brand: FLYTE

## (undated) DEVICE — APPLICATOR MEDICATED 26 CC SOLUTION HI LT ORNG CHLORAPREP

## (undated) DEVICE — SUTURE ABSORBABLE MONOFILAMENT 1 CTX 36 CM 48 MM VIO PDS +

## (undated) DEVICE — STRYKER PERFORMANCE SERIES SAGITTAL BLADE: Brand: STRYKER PERFORMANCE SERIES

## (undated) DEVICE — SUTURE VICRYL COATED ABSORBABLE BRAIDED 2-0 TP1 54 IN COAT UD VICRYL + VCP880T

## (undated) DEVICE — SOLUTION IRRIG 3000ML 0.9% SOD CHL USP UROMATIC PLAS CONT

## (undated) DEVICE — YANKAUER,FLEXIBLE HANDLE,REGLR CAPACITY: Brand: MEDLINE INDUSTRIES, INC.

## (undated) DEVICE — HYPODERMIC SAFETY NEEDLE: Brand: MAGELLAN

## (undated) DEVICE — ZIPPERED TOGA, 2X LARGE: Brand: FLYTE, SURGICOOL

## (undated) DEVICE — DRESSING ANTIMIC FOAM MEPILEX BORD POSTOP AG PROD SZ 4X12 IN

## (undated) DEVICE — PINNACLE INTRODUCER SHEATH: Brand: PINNACLE

## (undated) DEVICE — SOLIDIFIER FLUID 3000 CC ABSORB

## (undated) DEVICE — HEART CATH-MRMC: Brand: MEDLINE INDUSTRIES, INC.

## (undated) DEVICE — TUBING PRSS MON L6IN PVC M FEM CONN

## (undated) DEVICE — SUTURE MONOCRYL SZ 3-0 L27IN ABSRB UD PS-2 3/8 CIR REV CUT NDL MCP427H

## (undated) DEVICE — 1200 GUARD II KIT W/5MM TUBE W/O VAC TUBE: Brand: GUARDIAN

## (undated) DEVICE — DECANTER BAG 9": Brand: MEDLINE INDUSTRIES, INC.

## (undated) DEVICE — ACCESS SHEATH WITH DILATOR: Brand: WATCHMAN™ TRUSEAL™ ACCESS SYSTEM

## (undated) DEVICE — DRESSING HEMSTAT W3INXL2YD 1 PLY Z FLD QUIKCLOT CONTROL+

## (undated) DEVICE — STERILE POLYISOPRENE POWDER-FREE SURGICAL GLOVES WITH EMOLLIENT COATING: Brand: PROTEXIS

## (undated) DEVICE — SET EXTN TBNG L BOR 4 W STPCOCK ST 32IN PRIMING VOL 6ML

## (undated) DEVICE — CONTAINER,SPECIMEN,3OZ,OR STRL: Brand: MEDLINE

## (undated) DEVICE — SPONGE GZ W4XL4IN COT 12 PLY TYP VII WVN C FLD DSGN STERILE

## (undated) DEVICE — YANKAUER,OPEN TIP,W/O VENT,STERILE: Brand: MEDLINE INDUSTRIES, INC.

## (undated) DEVICE — NEEDLE HYPO 18GA L1.5IN PNK S STL HUB POLYPR SHLD REG BVL

## (undated) DEVICE — STERILE POLYISOPRENE POWDER-FREE SURGICAL GLOVES: Brand: PROTEXIS

## (undated) DEVICE — LIQUIBAND RAPID ADHESIVE 36/CS 0.8ML: Brand: MEDLINE

## (undated) DEVICE — SOLUTION SURG PREP 26 CC PURPREP

## (undated) DEVICE — TOTAL JOINT - SMH: Brand: MEDLINE INDUSTRIES, INC.

## (undated) DEVICE — DRESSING FOAM 4X8IN DISP POSTOP MEPILEX BORD AG

## (undated) DEVICE — ALCOHOL RUBBING ISO 16OZ 70%

## (undated) DEVICE — KENDALL RADIOLUCENT FOAM MONITORING ELECTRODE -RECTANGULAR SHAPE: Brand: KENDALL

## (undated) DEVICE — TUBING, SUCTION, 9/32" X 12', STRAIGHT: Brand: MEDLINE INDUSTRIES, INC.

## (undated) DEVICE — PADDING CAST W6INXL4YD NONSTERILE COT RAYON MICROPLEATED

## (undated) DEVICE — CATHETER ANGIO JR4 PIG 145 DEG 6 FRX100 CM MP SUPER TORQUE +

## (undated) DEVICE — GLOVE SURG SZ 65 L12IN FNGR THK94MIL STD WHT LTX FREE

## (undated) DEVICE — SYRINGE MED 20ML STD CLR PLAS LUERLOCK TIP N CTRL DISP

## (undated) DEVICE — LEFT ATRIAL APPENDAGE CLOSURE DEVICE WITH DELIVERY SYSTEM
Type: IMPLANTABLE DEVICE | Status: NON-FUNCTIONAL
Brand: WATCHMAN FLX™
Removed: 2022-05-11

## (undated) DEVICE — SUTURE ABSORBABLE BRAIDED 2-0 CT-1 27 IN UD VICRYL J259H

## (undated) DEVICE — KIT IV STRT W CHLORAPREP PD 1ML

## (undated) DEVICE — SYRINGE 20ML LL S/C 50

## (undated) DEVICE — BAG BELONG PT PERS CLEAR HANDL

## (undated) DEVICE — HOOD, PEEL-AWAY: Brand: FLYTE

## (undated) DEVICE — CATH IV AUTOGRD BC BLU 22GA 25 -- INSYTE

## (undated) DEVICE — 6619 2 PTNT ISO SYS INCISE AREA&LT;(&GT;&&LT;)&GT;P: Brand: STERI-DRAPE™ IOBAN™ 2

## (undated) DEVICE — SCRUBIN SCRUB BRUSH DRY STER: Brand: MEDLINE INDUSTRIES, INC.

## (undated) DEVICE — MEDI-TRACE CADENCE ADULT, DEFIBRILLATION ELECTRODE -RTS  (10 PR/PK) - PHYSIO-CONTROL: Brand: MEDI-TRACE CADENCE

## (undated) DEVICE — CONNECTOR TBNG AUX H2O JET DISP FOR OLY 160/180 SER

## (undated) DEVICE — DRAPE,U/ SHT,SPLIT,PLAS,STERIL: Brand: MEDLINE

## (undated) DEVICE — GLOVE SURG SZ 65 L12IN FNGR THK79MIL GRN LTX FREE

## (undated) DEVICE — 1 X VERSACROSS TRANSSEPTAL SHEATH (INCLUDING  1 X J-TIP GUIDEWIRE); 1 X VERSACROSS RF WIRE (INCLUDING 1 X CONNECTOR CABLE (SINGLE USE)); 1 X DISPERSIVE ELECTRODE: Brand: VERSACROSS ACCESS SOLUTION

## (undated) DEVICE — ELECTRODE PT RET AD L9FT HI MOIST COND ADH HYDRGEL CORDED

## (undated) DEVICE — DRAPE C ARM W/ POLY STRP W42XL72IN FOR MOB XR

## (undated) DEVICE — BW-412T DISP COMBO CLEANING BRUSH: Brand: SINGLE USE COMBINATION CLEANING BRUSH

## (undated) DEVICE — CHECK-FLO INTRODUCER SET: Brand: PERFORMER

## (undated) DEVICE — 4-PORT MANIFOLD: Brand: NEPTUNE 2

## (undated) DEVICE — SUTURE VICRYL 1 L27IN ABSRB CT BRAID COAT UD J281H

## (undated) DEVICE — Z DISCONTINUED USE 2751540 TUBING IRRIG L10IN DISP PMP ENDOGATOR

## (undated) DEVICE — SET ADMIN 16ML TBNG L100IN 2 Y INJ SITE IV PIGGY BK DISP

## (undated) DEVICE — SUT SLK 0 30IN SH BLK --

## (undated) DEVICE — ELECTRODE BLDE L4IN NONINSULATED EDGE

## (undated) DEVICE — STRIP SKIN CLSR W1XL5IN NYLON REINF CURAD STERIL

## (undated) DEVICE — ENDO CARRY-ON PROCEDURE KIT INCLUDES ENZYMATIC SPONGE, GAUZE, BIOHAZARD LABEL, TRAY, LUBRICANT, DIRTY SCOPE LABEL, WATER LABEL, TRAY, DRAWSTRING PAD, AND DEFENDO 4-PIECE KIT.: Brand: ENDO CARRY-ON PROCEDURE KIT

## (undated) DEVICE — QUILTED PREMIUM COMFORT UNDERPAD,EXTRA HEAVY: Brand: WINGS